# Patient Record
Sex: MALE | Race: WHITE | Employment: OTHER | URBAN - METROPOLITAN AREA
[De-identification: names, ages, dates, MRNs, and addresses within clinical notes are randomized per-mention and may not be internally consistent; named-entity substitution may affect disease eponyms.]

---

## 2021-06-15 ENCOUNTER — APPOINTMENT (OUTPATIENT)
Dept: GENERAL RADIOLOGY | Age: 61
DRG: 194 | End: 2021-06-15
Payer: COMMERCIAL

## 2021-06-15 ENCOUNTER — HOSPITAL ENCOUNTER (INPATIENT)
Age: 61
LOS: 1 days | Discharge: LEFT BEFORE DISCHARGE | DRG: 194 | End: 2021-06-16
Attending: EMERGENCY MEDICINE | Admitting: INTERNAL MEDICINE
Payer: COMMERCIAL

## 2021-06-15 DIAGNOSIS — I50.9 ACUTE DECOMPENSATED HEART FAILURE (HCC): Primary | ICD-10-CM

## 2021-06-15 LAB
ALBUMIN SERPL-MCNC: 3.5 G/DL (ref 3.5–5.2)
ALP BLD-CCNC: 91 U/L (ref 40–129)
ALT SERPL-CCNC: 32 U/L (ref 0–40)
ANION GAP SERPL CALCULATED.3IONS-SCNC: 14 MMOL/L (ref 7–16)
AST SERPL-CCNC: 23 U/L (ref 0–39)
BASOPHILS ABSOLUTE: 0.07 E9/L (ref 0–0.2)
BASOPHILS RELATIVE PERCENT: 0.8 % (ref 0–2)
BILIRUB SERPL-MCNC: 1 MG/DL (ref 0–1.2)
BUN BLDV-MCNC: 11 MG/DL (ref 6–23)
CALCIUM SERPL-MCNC: 9.2 MG/DL (ref 8.6–10.2)
CHLORIDE BLD-SCNC: 102 MMOL/L (ref 98–107)
CO2: 19 MMOL/L (ref 22–29)
CREAT SERPL-MCNC: 1 MG/DL (ref 0.7–1.2)
EKG ATRIAL RATE: 100 BPM
EKG P AXIS: 5 DEGREES
EKG P-R INTERVAL: 212 MS
EKG Q-T INTERVAL: 360 MS
EKG QRS DURATION: 114 MS
EKG QTC CALCULATION (BAZETT): 464 MS
EKG R AXIS: -52 DEGREES
EKG T AXIS: 90 DEGREES
EKG VENTRICULAR RATE: 100 BPM
EOSINOPHILS ABSOLUTE: 0.19 E9/L (ref 0.05–0.5)
EOSINOPHILS RELATIVE PERCENT: 2.1 % (ref 0–6)
GFR AFRICAN AMERICAN: >60
GFR NON-AFRICAN AMERICAN: >60 ML/MIN/1.73
GLUCOSE BLD-MCNC: 233 MG/DL (ref 74–99)
HBA1C MFR BLD: 8.1 % (ref 4–5.6)
HCT VFR BLD CALC: 50.2 % (ref 37–54)
HEMOGLOBIN: 16.1 G/DL (ref 12.5–16.5)
IMMATURE GRANULOCYTES #: 0.06 E9/L
IMMATURE GRANULOCYTES %: 0.6 % (ref 0–5)
LV EF: 18 %
LVEF MODALITY: NORMAL
LYMPHOCYTES ABSOLUTE: 1.09 E9/L (ref 1.5–4)
LYMPHOCYTES RELATIVE PERCENT: 11.8 % (ref 20–42)
MAGNESIUM: 1.7 MG/DL (ref 1.6–2.6)
MCH RBC QN AUTO: 30.8 PG (ref 26–35)
MCHC RBC AUTO-ENTMCNC: 32.1 % (ref 32–34.5)
MCV RBC AUTO: 96.2 FL (ref 80–99.9)
METER GLUCOSE: 191 MG/DL (ref 74–99)
METER GLUCOSE: 284 MG/DL (ref 74–99)
METER GLUCOSE: 292 MG/DL (ref 74–99)
MONOCYTES ABSOLUTE: 0.68 E9/L (ref 0.1–0.95)
MONOCYTES RELATIVE PERCENT: 7.3 % (ref 2–12)
NEUTROPHILS ABSOLUTE: 7.17 E9/L (ref 1.8–7.3)
NEUTROPHILS RELATIVE PERCENT: 77.4 % (ref 43–80)
PDW BLD-RTO: 14.6 FL (ref 11.5–15)
PLATELET # BLD: 180 E9/L (ref 130–450)
PMV BLD AUTO: 10.9 FL (ref 7–12)
POTASSIUM SERPL-SCNC: 4 MMOL/L (ref 3.5–5)
PRO-BNP: 4310 PG/ML (ref 0–125)
RBC # BLD: 5.22 E12/L (ref 3.8–5.8)
SODIUM BLD-SCNC: 135 MMOL/L (ref 132–146)
TOTAL PROTEIN: 6.7 G/DL (ref 6.4–8.3)
TROPONIN, HIGH SENSITIVITY: 24 NG/L (ref 0–11)
WBC # BLD: 9.3 E9/L (ref 4.5–11.5)

## 2021-06-15 PROCEDURE — 93005 ELECTROCARDIOGRAM TRACING: CPT | Performed by: EMERGENCY MEDICINE

## 2021-06-15 PROCEDURE — 83036 HEMOGLOBIN GLYCOSYLATED A1C: CPT

## 2021-06-15 PROCEDURE — 93306 TTE W/DOPPLER COMPLETE: CPT

## 2021-06-15 PROCEDURE — 84484 ASSAY OF TROPONIN QUANT: CPT

## 2021-06-15 PROCEDURE — 1200000000 HC SEMI PRIVATE

## 2021-06-15 PROCEDURE — 6360000002 HC RX W HCPCS: Performed by: INTERNAL MEDICINE

## 2021-06-15 PROCEDURE — 6370000000 HC RX 637 (ALT 250 FOR IP): Performed by: INTERNAL MEDICINE

## 2021-06-15 PROCEDURE — 6360000002 HC RX W HCPCS: Performed by: EMERGENCY MEDICINE

## 2021-06-15 PROCEDURE — 83735 ASSAY OF MAGNESIUM: CPT

## 2021-06-15 PROCEDURE — 6360000004 HC RX CONTRAST MEDICATION: Performed by: INTERNAL MEDICINE

## 2021-06-15 PROCEDURE — 99284 EMERGENCY DEPT VISIT MOD MDM: CPT

## 2021-06-15 PROCEDURE — 71045 X-RAY EXAM CHEST 1 VIEW: CPT

## 2021-06-15 PROCEDURE — 93010 ELECTROCARDIOGRAM REPORT: CPT | Performed by: INTERNAL MEDICINE

## 2021-06-15 PROCEDURE — 83880 ASSAY OF NATRIURETIC PEPTIDE: CPT

## 2021-06-15 PROCEDURE — 85025 COMPLETE CBC W/AUTO DIFF WBC: CPT

## 2021-06-15 PROCEDURE — 80053 COMPREHEN METABOLIC PANEL: CPT

## 2021-06-15 PROCEDURE — 2580000003 HC RX 258: Performed by: INTERNAL MEDICINE

## 2021-06-15 PROCEDURE — 82962 GLUCOSE BLOOD TEST: CPT

## 2021-06-15 RX ORDER — POLYETHYLENE GLYCOL 3350 17 G/17G
17 POWDER, FOR SOLUTION ORAL DAILY PRN
Status: DISCONTINUED | OUTPATIENT
Start: 2021-06-15 | End: 2021-06-16 | Stop reason: HOSPADM

## 2021-06-15 RX ORDER — ONDANSETRON 2 MG/ML
4 INJECTION INTRAMUSCULAR; INTRAVENOUS EVERY 6 HOURS PRN
Status: DISCONTINUED | OUTPATIENT
Start: 2021-06-15 | End: 2021-06-16 | Stop reason: HOSPADM

## 2021-06-15 RX ORDER — TADALAFIL 5 MG/1
5 TABLET ORAL PRN
Status: ON HOLD | COMMUNITY
End: 2021-06-16 | Stop reason: HOSPADM

## 2021-06-15 RX ORDER — ASPIRIN 81 MG/1
81 TABLET ORAL DAILY
COMMUNITY
End: 2021-10-13

## 2021-06-15 RX ORDER — ACETAMINOPHEN 325 MG/1
650 TABLET ORAL EVERY 6 HOURS PRN
Status: DISCONTINUED | OUTPATIENT
Start: 2021-06-15 | End: 2021-06-16 | Stop reason: HOSPADM

## 2021-06-15 RX ORDER — INSULIN GLARGINE 100 [IU]/ML
50 INJECTION, SOLUTION SUBCUTANEOUS NIGHTLY
Status: ON HOLD | COMMUNITY
End: 2021-09-07 | Stop reason: HOSPADM

## 2021-06-15 RX ORDER — SODIUM CHLORIDE 0.9 % (FLUSH) 0.9 %
5-40 SYRINGE (ML) INJECTION PRN
Status: DISCONTINUED | OUTPATIENT
Start: 2021-06-15 | End: 2021-06-16 | Stop reason: HOSPADM

## 2021-06-15 RX ORDER — GLYBURIDE 5 MG/1
5 TABLET ORAL 2 TIMES DAILY
Status: ON HOLD | COMMUNITY
End: 2021-09-07 | Stop reason: HOSPADM

## 2021-06-15 RX ORDER — SODIUM CHLORIDE 0.9 % (FLUSH) 0.9 %
5-40 SYRINGE (ML) INJECTION EVERY 12 HOURS SCHEDULED
Status: DISCONTINUED | OUTPATIENT
Start: 2021-06-15 | End: 2021-06-16 | Stop reason: HOSPADM

## 2021-06-15 RX ORDER — FUROSEMIDE 10 MG/ML
20 INJECTION INTRAMUSCULAR; INTRAVENOUS
Status: DISCONTINUED | OUTPATIENT
Start: 2021-06-15 | End: 2021-06-16 | Stop reason: HOSPADM

## 2021-06-15 RX ORDER — ONDANSETRON 4 MG/1
4 TABLET, ORALLY DISINTEGRATING ORAL EVERY 8 HOURS PRN
Status: DISCONTINUED | OUTPATIENT
Start: 2021-06-15 | End: 2021-06-16 | Stop reason: HOSPADM

## 2021-06-15 RX ORDER — ACETAMINOPHEN 650 MG/1
650 SUPPOSITORY RECTAL EVERY 6 HOURS PRN
Status: DISCONTINUED | OUTPATIENT
Start: 2021-06-15 | End: 2021-06-16 | Stop reason: HOSPADM

## 2021-06-15 RX ORDER — DEXTROSE MONOHYDRATE 25 G/50ML
12.5 INJECTION, SOLUTION INTRAVENOUS PRN
Status: DISCONTINUED | OUTPATIENT
Start: 2021-06-15 | End: 2021-06-16 | Stop reason: HOSPADM

## 2021-06-15 RX ORDER — NICOTINE POLACRILEX 4 MG
15 LOZENGE BUCCAL PRN
Status: DISCONTINUED | OUTPATIENT
Start: 2021-06-15 | End: 2021-06-16 | Stop reason: HOSPADM

## 2021-06-15 RX ORDER — INSULIN GLARGINE 100 [IU]/ML
25 INJECTION, SOLUTION SUBCUTANEOUS NIGHTLY
Status: DISCONTINUED | OUTPATIENT
Start: 2021-06-15 | End: 2021-06-16 | Stop reason: HOSPADM

## 2021-06-15 RX ORDER — LISINOPRIL 20 MG/1
40 TABLET ORAL DAILY
Status: DISCONTINUED | OUTPATIENT
Start: 2021-06-15 | End: 2021-06-16 | Stop reason: HOSPADM

## 2021-06-15 RX ORDER — DEXTROSE MONOHYDRATE 50 MG/ML
100 INJECTION, SOLUTION INTRAVENOUS PRN
Status: DISCONTINUED | OUTPATIENT
Start: 2021-06-15 | End: 2021-06-16 | Stop reason: HOSPADM

## 2021-06-15 RX ORDER — SODIUM CHLORIDE 9 MG/ML
25 INJECTION, SOLUTION INTRAVENOUS PRN
Status: DISCONTINUED | OUTPATIENT
Start: 2021-06-15 | End: 2021-06-16 | Stop reason: HOSPADM

## 2021-06-15 RX ORDER — FUROSEMIDE 10 MG/ML
20 INJECTION INTRAMUSCULAR; INTRAVENOUS ONCE
Status: COMPLETED | OUTPATIENT
Start: 2021-06-15 | End: 2021-06-15

## 2021-06-15 RX ADMIN — INSULIN GLARGINE 25 UNITS: 100 INJECTION, SOLUTION SUBCUTANEOUS at 20:26

## 2021-06-15 RX ADMIN — LISINOPRIL 40 MG: 20 TABLET ORAL at 14:01

## 2021-06-15 RX ADMIN — INSULIN LISPRO 3 UNITS: 100 INJECTION, SOLUTION INTRAVENOUS; SUBCUTANEOUS at 20:26

## 2021-06-15 RX ADMIN — FUROSEMIDE 20 MG: 10 INJECTION, SOLUTION INTRAVENOUS at 12:55

## 2021-06-15 RX ADMIN — ENOXAPARIN SODIUM 40 MG: 40 INJECTION SUBCUTANEOUS at 17:46

## 2021-06-15 RX ADMIN — ENOXAPARIN SODIUM 40 MG: 40 INJECTION SUBCUTANEOUS at 14:02

## 2021-06-15 RX ADMIN — PERFLUTREN 1.65 MG: 6.52 INJECTION, SUSPENSION INTRAVENOUS at 14:55

## 2021-06-15 RX ADMIN — INSULIN LISPRO 6 UNITS: 100 INJECTION, SOLUTION INTRAVENOUS; SUBCUTANEOUS at 17:47

## 2021-06-15 RX ADMIN — SODIUM CHLORIDE, PRESERVATIVE FREE 10 ML: 5 INJECTION INTRAVENOUS at 20:27

## 2021-06-15 RX ADMIN — FUROSEMIDE 20 MG: 10 INJECTION, SOLUTION INTRAVENOUS at 17:46

## 2021-06-15 ASSESSMENT — PAIN SCALES - GENERAL
PAINLEVEL_OUTOF10: 0
PAINLEVEL_OUTOF10: 4

## 2021-06-15 ASSESSMENT — PAIN DESCRIPTION - ORIENTATION: ORIENTATION: RIGHT;LEFT;LOWER

## 2021-06-15 ASSESSMENT — ENCOUNTER SYMPTOMS
DIARRHEA: 0
VOMITING: 0
COLOR CHANGE: 0
TROUBLE SWALLOWING: 0
NAUSEA: 0
RHINORRHEA: 0
BLOOD IN STOOL: 0
ABDOMINAL PAIN: 0
COUGH: 1
SHORTNESS OF BREATH: 1

## 2021-06-15 ASSESSMENT — PAIN DESCRIPTION - DESCRIPTORS: DESCRIPTORS: PATIENT UNABLE TO DESCRIBE

## 2021-06-15 ASSESSMENT — PAIN DESCRIPTION - FREQUENCY: FREQUENCY: CONTINUOUS

## 2021-06-15 ASSESSMENT — PAIN DESCRIPTION - PAIN TYPE: TYPE: ACUTE PAIN

## 2021-06-15 ASSESSMENT — PAIN DESCRIPTION - LOCATION: LOCATION: LEG

## 2021-06-15 ASSESSMENT — PAIN DESCRIPTION - PROGRESSION: CLINICAL_PROGRESSION: NOT CHANGED

## 2021-06-15 NOTE — ED NOTES
Pt. Admission initiated. SBAR faxed to floor 5s. Floor called, spoke with RN, fax verified. Pt transport called. Awaiting transport, will continue to monitor pt.       rGiselda Rosales RN  06/15/21 3115

## 2021-06-15 NOTE — ED PROVIDER NOTES
ED PROVIDER NOTE    Chief Complaint   Patient presents with    Shortness of Breath     ongoing 6 mo.  Leg Swelling     bilat lower leg edema/swelling       HPI:  6/15/21,   Time: 12:30 PM EDT       Anthony Clarke is a 63 y.o. male presenting to the ED for shortness of breath and leg swelling. Shortness of breath gradual onset x several months since having COVID, worse over the past few weeks. Worse w/ exertion. No orthopnea. +BLE swelling x 1 week. No fever, chills, chest pain. +cough prod of white sputum. No known hx of CAD or CHF. Former smoker. Taking mucinex without improvement. Gets short of breath w/ minimal exertion. Chart review: hx of HTN    Review of Systems:     Review of Systems   Constitutional: Positive for fatigue. Negative for appetite change, chills and fever. HENT: Negative for congestion, rhinorrhea and trouble swallowing. Eyes: Negative for visual disturbance. Respiratory: Positive for cough and shortness of breath. Cardiovascular: Positive for leg swelling. Negative for chest pain. Gastrointestinal: Negative for abdominal pain, blood in stool, diarrhea, nausea and vomiting. Genitourinary: Negative for decreased urine volume, difficulty urinating, dysuria, frequency, hematuria and urgency. Musculoskeletal: Negative for myalgias, neck pain and neck stiffness. Skin: Negative for color change. Neurological: Negative for dizziness, syncope, weakness, light-headedness, numbness and headaches.         --------------------------------------------- PAST HISTORY ---------------------------------------------  Past Medical History:   Past Medical History:   Diagnosis Date    Hypertension        Past Surgical History:   History reviewed. No pertinent surgical history.     Social History:   Social History     Socioeconomic History    Marital status:      Spouse name: None    Number of children: None    Years of education: None    Highest education level: None components within normal limits   MAGNESIUM   HEMOGLOBIN A1C       RADIOLOGY:  Interpreted personally and by Radiologist.  XR CHEST PORTABLE   Final Result   Interstitial and hazy opacities bilaterally suggestive of pulmonary vascular   congestion and developing interstitial pulmonary edema or peribronchial   inflammatory changes. EKG:  This EKG is signed and interpreted by the EP. Sinus rhythm w/ 1st degree AV block, vent rate 100bpm, LAD, nonspecific ST-T changes, no clinically significant change compared w/ prior EKG       ------------------------- NURSING NOTES AND VITALS REVIEWED ---------------------------   The nursing notes within the ED encounter and vital signs as below have been reviewed by myself. BP (!) 154/107   Pulse 94   Temp 97.8 °F (36.6 °C) (Tympanic)   Resp 16   Ht 6' 3\" (1.905 m)   Wt 280 lb (127 kg)   SpO2 96%   BMI 35.00 kg/m²   Oxygen Saturation Interpretation: Normal    The patients available past medical records and past encounters were reviewed. ------------------------------ ED COURSE/MEDICAL DECISION MAKING----------------------  Medications   furosemide (LASIX) injection 20 mg (has no administration in time range)   furosemide (LASIX) injection 20 mg (has no administration in time range)   perflutren lipid microspheres (DEFINITY) injection 1.65 mg (has no administration in time range)       Counseling: The emergency provider has spoken with the patient and discussed todays results, in addition to providing specific details for the plan of care and counseling regarding the diagnosis and prognosis. Questions are answered at this time and they are agreeable with the plan. ED Course/Medical Decision Makin y.o. male here with shortness of breath and leg swelling. Clinical evaluation, labs, imaging all c/w new onset CHF. O2 sats drop from 96% to 92% with conversation while sitting in bed.  Started IV diuresis in ED and admitted in stable condition for

## 2021-06-16 VITALS
SYSTOLIC BLOOD PRESSURE: 151 MMHG | RESPIRATION RATE: 16 BRPM | TEMPERATURE: 97.5 F | WEIGHT: 296.8 LBS | OXYGEN SATURATION: 96 % | HEART RATE: 80 BPM | DIASTOLIC BLOOD PRESSURE: 107 MMHG | HEIGHT: 75 IN | BODY MASS INDEX: 36.9 KG/M2

## 2021-06-16 PROBLEM — I10 ESSENTIAL HYPERTENSION: Status: ACTIVE | Noted: 2021-06-16

## 2021-06-16 PROBLEM — E66.01 CLASS 2 SEVERE OBESITY DUE TO EXCESS CALORIES WITH SERIOUS COMORBIDITY IN ADULT (HCC): Status: ACTIVE | Noted: 2021-06-16

## 2021-06-16 PROBLEM — E11.65 UNCONTROLLED TYPE 2 DIABETES MELLITUS WITH HYPERGLYCEMIA (HCC): Status: ACTIVE | Noted: 2021-06-16

## 2021-06-16 PROBLEM — I50.21 ACUTE SYSTOLIC CONGESTIVE HEART FAILURE (HCC): Status: ACTIVE | Noted: 2021-06-15

## 2021-06-16 PROBLEM — E66.812 CLASS 2 SEVERE OBESITY DUE TO EXCESS CALORIES WITH SERIOUS COMORBIDITY IN ADULT (HCC): Status: ACTIVE | Noted: 2021-06-16

## 2021-06-16 PROBLEM — I35.0 SEVERE AORTIC STENOSIS: Status: ACTIVE | Noted: 2021-06-16

## 2021-06-16 PROBLEM — I50.21 ACUTE SYSTOLIC CHF (CONGESTIVE HEART FAILURE) (HCC): Status: ACTIVE | Noted: 2021-06-16

## 2021-06-16 LAB
ANION GAP SERPL CALCULATED.3IONS-SCNC: 9 MMOL/L (ref 7–16)
BASOPHILS ABSOLUTE: 0.05 E9/L (ref 0–0.2)
BASOPHILS RELATIVE PERCENT: 0.6 % (ref 0–2)
BUN BLDV-MCNC: 12 MG/DL (ref 6–23)
CALCIUM SERPL-MCNC: 9.1 MG/DL (ref 8.6–10.2)
CHLORIDE BLD-SCNC: 104 MMOL/L (ref 98–107)
CO2: 25 MMOL/L (ref 22–29)
CREAT SERPL-MCNC: 1.1 MG/DL (ref 0.7–1.2)
EOSINOPHILS ABSOLUTE: 0.24 E9/L (ref 0.05–0.5)
EOSINOPHILS RELATIVE PERCENT: 2.7 % (ref 0–6)
GFR AFRICAN AMERICAN: >60
GFR NON-AFRICAN AMERICAN: >60 ML/MIN/1.73
GLUCOSE BLD-MCNC: 279 MG/DL (ref 74–99)
HCT VFR BLD CALC: 46 % (ref 37–54)
HEMOGLOBIN: 15.2 G/DL (ref 12.5–16.5)
IMMATURE GRANULOCYTES #: 0.03 E9/L
IMMATURE GRANULOCYTES %: 0.3 % (ref 0–5)
LYMPHOCYTES ABSOLUTE: 0.85 E9/L (ref 1.5–4)
LYMPHOCYTES RELATIVE PERCENT: 9.4 % (ref 20–42)
MCH RBC QN AUTO: 31.5 PG (ref 26–35)
MCHC RBC AUTO-ENTMCNC: 33 % (ref 32–34.5)
MCV RBC AUTO: 95.4 FL (ref 80–99.9)
METER GLUCOSE: 154 MG/DL (ref 74–99)
METER GLUCOSE: 280 MG/DL (ref 74–99)
MONOCYTES ABSOLUTE: 0.66 E9/L (ref 0.1–0.95)
MONOCYTES RELATIVE PERCENT: 7.3 % (ref 2–12)
NEUTROPHILS ABSOLUTE: 7.22 E9/L (ref 1.8–7.3)
NEUTROPHILS RELATIVE PERCENT: 79.7 % (ref 43–80)
PDW BLD-RTO: 14.6 FL (ref 11.5–15)
PLATELET # BLD: 175 E9/L (ref 130–450)
PMV BLD AUTO: 11.6 FL (ref 7–12)
POTASSIUM REFLEX MAGNESIUM: 4 MMOL/L (ref 3.5–5)
RBC # BLD: 4.82 E12/L (ref 3.8–5.8)
SODIUM BLD-SCNC: 138 MMOL/L (ref 132–146)
WBC # BLD: 9.1 E9/L (ref 4.5–11.5)

## 2021-06-16 PROCEDURE — 36415 COLL VENOUS BLD VENIPUNCTURE: CPT

## 2021-06-16 PROCEDURE — 85025 COMPLETE CBC W/AUTO DIFF WBC: CPT

## 2021-06-16 PROCEDURE — 6360000002 HC RX W HCPCS: Performed by: INTERNAL MEDICINE

## 2021-06-16 PROCEDURE — 80048 BASIC METABOLIC PNL TOTAL CA: CPT

## 2021-06-16 PROCEDURE — 6370000000 HC RX 637 (ALT 250 FOR IP): Performed by: INTERNAL MEDICINE

## 2021-06-16 PROCEDURE — 2580000003 HC RX 258: Performed by: INTERNAL MEDICINE

## 2021-06-16 PROCEDURE — 82962 GLUCOSE BLOOD TEST: CPT

## 2021-06-16 RX ORDER — AMLODIPINE BESYLATE 10 MG/1
10 TABLET ORAL DAILY
Status: ON HOLD | COMMUNITY
End: 2021-09-07 | Stop reason: HOSPADM

## 2021-06-16 RX ORDER — METOPROLOL SUCCINATE 25 MG/1
25 TABLET, EXTENDED RELEASE ORAL DAILY
Qty: 30 TABLET | Refills: 3 | Status: SHIPPED | OUTPATIENT
Start: 2021-06-17 | End: 2021-07-27

## 2021-06-16 RX ORDER — METOPROLOL SUCCINATE 25 MG/1
25 TABLET, EXTENDED RELEASE ORAL DAILY
Status: DISCONTINUED | OUTPATIENT
Start: 2021-06-16 | End: 2021-06-16 | Stop reason: HOSPADM

## 2021-06-16 RX ADMIN — INSULIN LISPRO 2 UNITS: 100 INJECTION, SOLUTION INTRAVENOUS; SUBCUTANEOUS at 06:32

## 2021-06-16 RX ADMIN — FUROSEMIDE 20 MG: 10 INJECTION, SOLUTION INTRAVENOUS at 06:32

## 2021-06-16 RX ADMIN — METOPROLOL SUCCINATE 25 MG: 25 TABLET, EXTENDED RELEASE ORAL at 09:58

## 2021-06-16 RX ADMIN — ENOXAPARIN SODIUM 40 MG: 40 INJECTION SUBCUTANEOUS at 08:07

## 2021-06-16 RX ADMIN — LISINOPRIL 40 MG: 20 TABLET ORAL at 08:07

## 2021-06-16 RX ADMIN — INSULIN LISPRO 6 UNITS: 100 INJECTION, SOLUTION INTRAVENOUS; SUBCUTANEOUS at 12:06

## 2021-06-16 RX ADMIN — SODIUM CHLORIDE, PRESERVATIVE FREE 10 ML: 5 INJECTION INTRAVENOUS at 08:07

## 2021-06-16 ASSESSMENT — PAIN SCALES - GENERAL: PAINLEVEL_OUTOF10: 0

## 2021-06-16 NOTE — CARE COORDINATION
Social Work / Discharge Planning : SW met with patient and explained role as discharge planner/ transition of care. Patient admitted with CHF> patient plan at discharge is HOME where he resides independently. Patient does NOT use a device. No hx of HHC/SNF. Patient PCP is DR Trip Villarreal and he uses [facility] on [address]. Patient does NOT have insurance. Stated he is going through divorce and he couldn't afford it. Self employed as  but has not been able to work last few weeks. SW explaiend to patient process with HELP department. Await screening and see what patient is eligible for insurance/med wise. Patient denies any additional home needs. AWait plan. SW to follow.  Electronically signed by BILLY Astorga on 6/16/21 at 8:26 AM EDT

## 2021-06-16 NOTE — H&P
[address] [facility]  History and Physical      CHIEF COMPLAINT: Leg swelling      HISTORY OF PRESENT ILLNESS:      The patient is a 63 y.o. male patient of Dr. Librado Ward history of hypertension, diabetes who presents with leg swelling. Patient presented to ED yesterday with complaints of leg swelling and shortness of breath. Patient notes dyspnea gradual onset over the last 6 months. Dyspnea is worse with exacerbation denies orthopnea. No fevers chills + cough. +1-week of worsening leg swelling. History of tobacco abuse. Denies history of CHF or COPD. Past Medical History:    Past Medical History:   Diagnosis Date    Hypertension        Past Surgical History:    History reviewed. No pertinent surgical history. Medications Prior to Admission:    Medications Prior to Admission: insulin glargine (BASAGLAR KWIKPEN) 100 UNIT/ML injection pen, Inject 50 Units into the skin nightly  tadalafil (CIALIS) 5 MG tablet, Take 5 mg by mouth as needed for Erectile Dysfunction  aspirin 81 MG EC tablet, Take 81 mg by mouth daily  lisinopril (PRINIVIL;ZESTRIL) 20 MG tablet, Take 40 mg by mouth daily. glyBURIDE (DIABETA) 5 MG tablet, Take 5 mg by mouth as needed    Allergies:    Patient has no known allergies. Social History:    reports that he quit smoking about 20 years ago. His smoking use included cigarettes. He started smoking about 48 years ago. He smoked 1.50 packs per day. He has never used smokeless tobacco. He reports current alcohol use. He reports that he does not use drugs. Family History:   family history includes Breast Cancer in his sister; Heart Disease in his father; No Known Problems in his brother, brother, and sister; Other in his mother.     REVIEW OF SYSTEMS:  As above in the HPI, otherwise negative    PHYSICAL EXAM:    Vitals:  BP (!) 151/107   Pulse 80   Temp 97.5 °F (36.4 °C) (Oral)   Resp 16   Ht 6' 3\" (1.905 m)   Wt 296 lb 12.8 oz (134.6 kg)   SpO2 96%   BMI 37.10 kg/m²     General:  Awake, alert, oriented X 3. Well developed, well nourished, well groomed. No apparent distress. HEENT:  Normocephalic, atraumatic. Pupils equal, round, reactive to light. No scleral icterus. No conjunctival injection. Normal lips, teeth, and gums. No nasal discharge. Neck:  Supple  Heart:  RRR, no murmurs, gallops, rubs  Lungs:  CTA bilaterally, bilat symmetrical expansion, no wheeze, rales, or rhonchi  Abdomen:   Bowel sounds present, soft, nontender, no masses, no organomegaly, no peritoneal signs  Extremities:  No clubbing, cyanosis, 1+edema  Skin:  Warm and dry, no open lesions or rash  Neuro:  Cranial nerves 2-12 intact, no focal deficits  Breast: deferred  Rectal: deferred  Genitalia:  deferred    LABS:    CBC with Differential:    Lab Results   Component Value Date    WBC 9.3 06/15/2021    RBC 5.22 06/15/2021    HGB 16.1 06/15/2021    HCT 50.2 06/15/2021     06/15/2021    MCV 96.2 06/15/2021    MCH 30.8 06/15/2021    MCHC 32.1 06/15/2021    RDW 14.6 06/15/2021    LYMPHOPCT 11.8 06/15/2021    MONOPCT 7.3 06/15/2021    BASOPCT 0.8 06/15/2021    MONOSABS 0.68 06/15/2021    LYMPHSABS 1.09 06/15/2021    EOSABS 0.19 06/15/2021    BASOSABS 0.07 06/15/2021     CMP:    Lab Results   Component Value Date     06/15/2021    K 4.0 06/15/2021     06/15/2021    CO2 19 06/15/2021    BUN 11 06/15/2021    CREATININE 1.0 06/15/2021    GFRAA >60 06/15/2021    LABGLOM >60 06/15/2021    GLUCOSE 233 06/15/2021    GLUCOSE 259 04/13/2012    PROT 6.7 06/15/2021    LABALBU 3.5 06/15/2021    CALCIUM 9.2 06/15/2021    BILITOT 1.0 06/15/2021    ALKPHOS 91 06/15/2021    AST 23 06/15/2021    ALT 32 06/15/2021     Magnesium:    Lab Results   Component Value Date    MG 1.7 06/15/2021     Phosphorus:  No results found for: PHOS  PT/INR:  No results found for: PROTIME, INR  Last 3 Troponin:  No results found for: TROPONINI  U/A:  No results found for: NITRITE, COLORU, PROTEINU, PHUR, LABCAST, Fredrica Speed, MUCUS, TRICHOMONAS, YEAST, BACTERIA, CLARITYU, SPECGRAV, LEUKOCYTESUR, UROBILINOGEN, BILIRUBINUR, BLOODU, GLUCOSEU, AMORPHOUS  ABG:  No results found for: PH, PCO2, PO2, HCO3, BE, THGB, TCO2, O2SAT  HgBA1c:    Lab Results   Component Value Date    LABA1C 8.1 06/15/2021     FLP:  No results found for: TRIG, HDL, LDLCALC, LDLDIRECT, LABVLDL  TSH:  No results found for: TSH    XR CHEST PORTABLE   Final Result   Interstitial and hazy opacities bilaterally suggestive of pulmonary vascular   congestion and developing interstitial pulmonary edema or peribronchial   inflammatory changes. ASSESSMENT:      Principal Problem:    Acute systolic congestive heart failure (HCC)  Active Problems:    Class 2 severe obesity due to excess calories with serious comorbidity in adult [facility])    Uncontrolled type 2 diabetes mellitus with hyperglycemia (HCC)    Essential hypertension    Severe aortic stenosis  Resolved Problems:    * No resolved hospital problems. *      PLAN:    26-year-old male history of obesity, diabetes, hypertension admitted with CHF    Admit monitored bed  IV Lasix  Monitor I's and O's  Daily weights  Monitor labs closely   Echo EF 15 to 20% with stage III diastolic dysfunction, moderately dilated RV with low normal global systolic, severe AS with valve area of 0.8 cm²    Medications for other co morbidities cont as appropriate w dosage adjustments as necessary  PT/OT  DVT PPx  DC planning recommend transfer [address] [facility], CTS, valve team. Pt refuses states stuff to  do. Advised of risk for death and permanent diability Pt to go AMA.          Electronically signed by Kishor Bunn MD on 6/16/2021 at 8:54 AM

## 2021-06-16 NOTE — PROGRESS NOTES
Called [facility] updated patient medication list the pharmacist there said he hasn't filled most of his prescriptions since September 2020. Lacy Buck

## 2021-06-16 NOTE — PLAN OF CARE
Problem: Pain:  Goal: Pain level will decrease  Description: Pain level will decrease  6/16/2021 0152 by Sherryle Long, RN  Outcome: Ongoing  6/15/2021 1303 by Fatoumata Mayberry RN  Outcome: Met This Shift  Goal: Control of acute pain  Description: Control of acute pain  6/16/2021 0152 by Sherryle Long, RN  Outcome: Ongoing  6/15/2021 1303 by Fatoumata Mayberry RN  Outcome: Met This Shift  Goal: Control of chronic pain  Description: Control of chronic pain  6/16/2021 0152 by Sherryle Long, RN  Outcome: Ongoing  6/15/2021 1303 by Fatoumata Mayberry RN  Outcome: Met This Shift     Problem: Falls - Risk of:  Goal: Will remain free from falls  Description: Will remain free from falls  6/16/2021 0152 by Sherryle Long, RN  Outcome: Ongoing  6/15/2021 1303 by Fatoumata Mayberry RN  Outcome: Met This Shift  Goal: Absence of physical injury  Description: Absence of physical injury  6/16/2021 0152 by Sherryle Long, RN  Outcome: Ongoing  6/15/2021 1303 by Fatoumata Mayberry RN  Outcome: Met This Shift     Problem: HH FLUID RETENTION-CHF  Goal: Absence of fluid overload signs and symptoms  6/16/2021 0152 by Sherryle Long, RN  Outcome: Ongoing  6/15/2021 1303 by Fatoumata Mayberry RN  Outcome: Ongoing     Problem: Gas Exchange - Impaired  Goal: Able to breathe comfortably  Description: Able to breathe comfortably  6/16/2021 0152 by Sherryle Long, RN  Outcome: Ongoing  6/15/2021 1303 by Fatoumata Mayberry RN  Outcome: Ongoing

## 2021-06-16 NOTE — DISCHARGE SUMMARY
Physician Discharge Summary     Patient ID:  Sukhdev Alexander  [id number]  63 y.o.  1960    Admit date: 6/15/2021    Discharge date and time:  6/16/2021    Discharge Diagnoses: Principal Problem:    Acute systolic congestive heart failure (HCC)  Active Problems:    Class 2 severe obesity due to excess calories with serious comorbidity in [facility])    Uncontrolled type 2 diabetes mellitus with hyperglycemia (HCC)    Essential hypertension    Severe aortic stenosis  Resolved Problems:    * No resolved hospital problems. *      Consults: IP CONSULT TO INTERNAL MEDICINE  IP CONSULT TO HEART FAILURE NURSE/COORDINATOR    Procedures: See below    Hospital Course:   69-year-old male history of obesity, diabetes, hypertension admitted with CHF    Admit monitored bed  IV Lasix  Monitor I's and O's  Daily weights  Monitor labs closely   Echo EF 15 to 20% with stage III diastolic dysfunction, moderately dilated RV with low normal global systolic, severe AS with valve area of 0.8 cm²    Medications for other co morbidities cont as appropriate w dosage adjustments as necessary  PT/OT  DVT PPx  DC planning recommend transfer [facility], CTS, valve team. Pt refuses states stuff to  do. Advised of risk for death and permanent diability Pt to go AMA. Advised return ASAP.          Discharge Exam:  See progress note from today    Condition:  Stable    Disposition: AMA    Patient Instructions:   Current Discharge Medication List      START taking these medications    Details   metoprolol succinate (TOPROL XL) 25 MG extended release tablet Take 1 tablet by mouth daily  Qty: 30 tablet, Refills: 3         CONTINUE these medications which have NOT CHANGED    Details   amLODIPine (NORVASC) 10 MG tablet Take 10 mg by mouth daily      insulin glargine (BASAGLAR KWIKPEN) 100 UNIT/ML injection pen Inject 50 Units into the skin nightly      aspirin 81 MG EC tablet Take 81 mg by mouth daily      glyBURIDE (DIABETA) 5 MG tablet Take 5 mg by mouth 2 times daily       lisinopril (PRINIVIL;ZESTRIL) 20 MG tablet Take 20 mg by mouth 2 times daily          STOP taking these medications       tadalafil (CIALIS) 5 MG tablet Comments:   Reason for Stopping:             Activity: activity as tolerated  Diet: cardiac diet    Follow-up with Return ASAP for further treatment    Signed:  Jacki Trujillo MD  6/16/2021  1:54 PM

## 2021-06-17 ENCOUNTER — APPOINTMENT (OUTPATIENT)
Dept: GENERAL RADIOLOGY | Age: 61
DRG: 192 | End: 2021-06-17
Payer: COMMERCIAL

## 2021-06-17 ENCOUNTER — TELEPHONE (OUTPATIENT)
Dept: OTHER | Facility: CLINIC | Age: 61
End: 2021-06-17

## 2021-06-17 ENCOUNTER — HOSPITAL ENCOUNTER (INPATIENT)
Age: 61
LOS: 6 days | Discharge: HOME | DRG: 192 | End: 2021-06-23
Attending: EMERGENCY MEDICINE | Admitting: INTERNAL MEDICINE
Payer: COMMERCIAL

## 2021-06-17 DIAGNOSIS — I50.31 ACUTE DIASTOLIC CONGESTIVE HEART FAILURE (HCC): Primary | ICD-10-CM

## 2021-06-17 PROBLEM — I50.9 HEART FAILURE (HCC): Status: ACTIVE | Noted: 2021-06-17

## 2021-06-17 LAB
ANION GAP SERPL CALCULATED.3IONS-SCNC: 10 MMOL/L (ref 7–16)
BUN BLDV-MCNC: 18 MG/DL (ref 6–23)
CALCIUM SERPL-MCNC: 9.3 MG/DL (ref 8.6–10.2)
CHLORIDE BLD-SCNC: 99 MMOL/L (ref 98–107)
CO2: 27 MMOL/L (ref 22–29)
CREAT SERPL-MCNC: 1.1 MG/DL (ref 0.7–1.2)
EKG ATRIAL RATE: 100 BPM
EKG P AXIS: 51 DEGREES
EKG P-R INTERVAL: 186 MS
EKG Q-T INTERVAL: 396 MS
EKG QRS DURATION: 132 MS
EKG QTC CALCULATION (BAZETT): 510 MS
EKG R AXIS: -54 DEGREES
EKG T AXIS: 97 DEGREES
EKG VENTRICULAR RATE: 100 BPM
GFR AFRICAN AMERICAN: >60
GFR NON-AFRICAN AMERICAN: >60 ML/MIN/1.73
GLUCOSE BLD-MCNC: 364 MG/DL (ref 74–99)
HCT VFR BLD CALC: 50.5 % (ref 37–54)
HEMOGLOBIN: 16 G/DL (ref 12.5–16.5)
MCH RBC QN AUTO: 30.4 PG (ref 26–35)
MCHC RBC AUTO-ENTMCNC: 31.7 % (ref 32–34.5)
MCV RBC AUTO: 96 FL (ref 80–99.9)
METER GLUCOSE: 338 MG/DL (ref 74–99)
PDW BLD-RTO: 14.6 FL (ref 11.5–15)
PLATELET # BLD: 179 E9/L (ref 130–450)
PMV BLD AUTO: 11.8 FL (ref 7–12)
POTASSIUM SERPL-SCNC: 4.6 MMOL/L (ref 3.5–5)
PRO-BNP: 5826 PG/ML (ref 0–125)
RBC # BLD: 5.26 E12/L (ref 3.8–5.8)
SODIUM BLD-SCNC: 136 MMOL/L (ref 132–146)
TROPONIN, HIGH SENSITIVITY: 26 NG/L (ref 0–11)
WBC # BLD: 9 E9/L (ref 4.5–11.5)

## 2021-06-17 PROCEDURE — 85027 COMPLETE CBC AUTOMATED: CPT

## 2021-06-17 PROCEDURE — 99255 IP/OBS CONSLTJ NEW/EST HI 80: CPT | Performed by: INTERNAL MEDICINE

## 2021-06-17 PROCEDURE — 93005 ELECTROCARDIOGRAM TRACING: CPT | Performed by: EMERGENCY MEDICINE

## 2021-06-17 PROCEDURE — 80048 BASIC METABOLIC PNL TOTAL CA: CPT

## 2021-06-17 PROCEDURE — 99284 EMERGENCY DEPT VISIT MOD MDM: CPT

## 2021-06-17 PROCEDURE — 6370000000 HC RX 637 (ALT 250 FOR IP): Performed by: INTERNAL MEDICINE

## 2021-06-17 PROCEDURE — 71046 X-RAY EXAM CHEST 2 VIEWS: CPT

## 2021-06-17 PROCEDURE — 82962 GLUCOSE BLOOD TEST: CPT

## 2021-06-17 PROCEDURE — 93010 ELECTROCARDIOGRAM REPORT: CPT | Performed by: INTERNAL MEDICINE

## 2021-06-17 PROCEDURE — 2140000000 HC CCU INTERMEDIATE R&B

## 2021-06-17 PROCEDURE — 6370000000 HC RX 637 (ALT 250 FOR IP): Performed by: FAMILY MEDICINE

## 2021-06-17 PROCEDURE — APPSS60 APP SPLIT SHARED TIME 46-60 MINUTES: Performed by: PHYSICIAN ASSISTANT

## 2021-06-17 PROCEDURE — 2580000003 HC RX 258: Performed by: FAMILY MEDICINE

## 2021-06-17 PROCEDURE — 84484 ASSAY OF TROPONIN QUANT: CPT

## 2021-06-17 PROCEDURE — 83880 ASSAY OF NATRIURETIC PEPTIDE: CPT

## 2021-06-17 PROCEDURE — 6360000002 HC RX W HCPCS: Performed by: INTERNAL MEDICINE

## 2021-06-17 PROCEDURE — 6360000002 HC RX W HCPCS: Performed by: EMERGENCY MEDICINE

## 2021-06-17 PROCEDURE — 6370000000 HC RX 637 (ALT 250 FOR IP): Performed by: EMERGENCY MEDICINE

## 2021-06-17 RX ORDER — SPIRONOLACTONE 25 MG/1
25 TABLET ORAL DAILY
Status: DISCONTINUED | OUTPATIENT
Start: 2021-06-17 | End: 2021-06-23 | Stop reason: HOSPADM

## 2021-06-17 RX ORDER — AMLODIPINE BESYLATE 10 MG/1
10 TABLET ORAL DAILY
Status: DISCONTINUED | OUTPATIENT
Start: 2021-06-17 | End: 2021-06-17

## 2021-06-17 RX ORDER — LISINOPRIL 20 MG/1
20 TABLET ORAL 2 TIMES DAILY
Status: DISCONTINUED | OUTPATIENT
Start: 2021-06-17 | End: 2021-06-23 | Stop reason: HOSPADM

## 2021-06-17 RX ORDER — ACETAMINOPHEN 650 MG/1
650 SUPPOSITORY RECTAL EVERY 6 HOURS PRN
Status: DISCONTINUED | OUTPATIENT
Start: 2021-06-17 | End: 2021-06-23 | Stop reason: HOSPADM

## 2021-06-17 RX ORDER — ACETAMINOPHEN 325 MG/1
650 TABLET ORAL EVERY 6 HOURS PRN
Status: DISCONTINUED | OUTPATIENT
Start: 2021-06-17 | End: 2021-06-23 | Stop reason: HOSPADM

## 2021-06-17 RX ORDER — SODIUM CHLORIDE 9 MG/ML
25 INJECTION, SOLUTION INTRAVENOUS PRN
Status: DISCONTINUED | OUTPATIENT
Start: 2021-06-17 | End: 2021-06-23 | Stop reason: HOSPADM

## 2021-06-17 RX ORDER — ASPIRIN 81 MG/1
81 TABLET ORAL DAILY
Status: DISCONTINUED | OUTPATIENT
Start: 2021-06-17 | End: 2021-06-23 | Stop reason: HOSPADM

## 2021-06-17 RX ORDER — SODIUM CHLORIDE 0.9 % (FLUSH) 0.9 %
10 SYRINGE (ML) INJECTION PRN
Status: DISCONTINUED | OUTPATIENT
Start: 2021-06-17 | End: 2021-06-23 | Stop reason: HOSPADM

## 2021-06-17 RX ORDER — FUROSEMIDE 10 MG/ML
40 INJECTION INTRAMUSCULAR; INTRAVENOUS 2 TIMES DAILY
Status: DISCONTINUED | OUTPATIENT
Start: 2021-06-17 | End: 2021-06-21

## 2021-06-17 RX ORDER — GLIPIZIDE 5 MG/1
5 TABLET ORAL
Status: DISCONTINUED | OUTPATIENT
Start: 2021-06-17 | End: 2021-06-23 | Stop reason: HOSPADM

## 2021-06-17 RX ORDER — FUROSEMIDE 10 MG/ML
40 INJECTION INTRAMUSCULAR; INTRAVENOUS ONCE
Status: COMPLETED | OUTPATIENT
Start: 2021-06-17 | End: 2021-06-17

## 2021-06-17 RX ORDER — ONDANSETRON 4 MG/1
4 TABLET, ORALLY DISINTEGRATING ORAL EVERY 8 HOURS PRN
Status: DISCONTINUED | OUTPATIENT
Start: 2021-06-17 | End: 2021-06-19

## 2021-06-17 RX ORDER — ONDANSETRON 2 MG/ML
4 INJECTION INTRAMUSCULAR; INTRAVENOUS EVERY 6 HOURS PRN
Status: DISCONTINUED | OUTPATIENT
Start: 2021-06-17 | End: 2021-06-19

## 2021-06-17 RX ORDER — GLYBURIDE 5 MG/1
5 TABLET ORAL 2 TIMES DAILY
Status: DISCONTINUED | OUTPATIENT
Start: 2021-06-17 | End: 2021-06-17 | Stop reason: CLARIF

## 2021-06-17 RX ORDER — METOPROLOL SUCCINATE 25 MG/1
25 TABLET, EXTENDED RELEASE ORAL DAILY
Status: DISCONTINUED | OUTPATIENT
Start: 2021-06-17 | End: 2021-06-19

## 2021-06-17 RX ORDER — SODIUM CHLORIDE 0.9 % (FLUSH) 0.9 %
5-40 SYRINGE (ML) INJECTION EVERY 12 HOURS SCHEDULED
Status: DISCONTINUED | OUTPATIENT
Start: 2021-06-17 | End: 2021-06-23 | Stop reason: HOSPADM

## 2021-06-17 RX ADMIN — LISINOPRIL 20 MG: 20 TABLET ORAL at 21:00

## 2021-06-17 RX ADMIN — GLIPIZIDE 5 MG: 5 TABLET ORAL at 18:03

## 2021-06-17 RX ADMIN — FUROSEMIDE 40 MG: 10 INJECTION, SOLUTION INTRAVENOUS at 18:03

## 2021-06-17 RX ADMIN — METOPROLOL SUCCINATE 25 MG: 25 TABLET, EXTENDED RELEASE ORAL at 18:03

## 2021-06-17 RX ADMIN — INSULIN HUMAN 10 UNITS: 100 INJECTION, SOLUTION PARENTERAL at 10:30

## 2021-06-17 RX ADMIN — FUROSEMIDE 40 MG: 10 INJECTION, SOLUTION INTRAMUSCULAR; INTRAVENOUS at 10:31

## 2021-06-17 RX ADMIN — SPIRONOLACTONE 25 MG: 25 TABLET ORAL at 18:03

## 2021-06-17 RX ADMIN — ASPIRIN 81 MG: 81 TABLET, COATED ORAL at 18:03

## 2021-06-17 RX ADMIN — Medication 10 ML: at 21:00

## 2021-06-17 ASSESSMENT — PAIN SCALES - GENERAL: PAINLEVEL_OUTOF10: 0

## 2021-06-17 NOTE — TELEPHONE ENCOUNTER
Writer contacted Dr. Elder ([place] ED provider to inform of 30 day readmission risk. Writer's attempt to contact ED provider was unsuccessful.

## 2021-06-17 NOTE — ED NOTES
Bed: 21  Expected date:   Expected time:   Means of arrival:   Comments:  juan ramon Barros RN  06/17/21 5189

## 2021-06-17 NOTE — ED PROVIDER NOTES
HPI:  6/17/21, Time: 8:38 AM EDT         Cherise Charlton is a 63 y.o. male presenting to the ED for SOB HAMILTON , beginning several days ago. The complaint has been persistent, severe in severity, and worsened by moderate exertion, supine position. Patient has been short of breath for several weeks was seen at [facility] ED yesterday was admitted to the hospital left consult medical advice after treatment team wanted to transfer him for cardiac catheterization. Patient is insulin-dependent diabetic states his legs been swelling up. While [facility] he had an echo which showed a EF of 15 to 20% with stage III diastolic dysfunction with moderate dilated RV and low normal global systolic function. He had severe AS with valve area 0.8 cm cube. Patient has left AGAINST MEDICAL ADVICE because he had things to do. He presents today with worsening shortness of breath. He has no fevers chills or cough. Denies chest pain. Patient placed on 3 L nasal cannula because O2 sat 90%. ROS:   Pertinent positives and negatives are stated within HPI, all other systems reviewed and are negative.  --------------------------------------------- PAST HISTORY ---------------------------------------------  Past Medical History:  has a past medical history of Hypertension. Past Surgical History:  has no past surgical history on file. Social History:  reports that he quit smoking about 20 years ago. His smoking use included cigarettes. He started smoking about 48 years ago. He smoked 1.50 packs per day. He has never used smokeless tobacco. He reports current alcohol use. He reports that he does not use drugs. Family History: family history includes Breast Cancer in his sister; Heart Disease in his father; No Known Problems in his brother, brother, and sister; Other in his mother. The patients home medications have been reviewed.     Allergies: Patient has no known allergies. ---------------------------------------------------PHYSICAL EXAM--------------------------------------    Constitutional/General: Alert and oriented x3, well appearing, non toxic in NAD  Head: Normocephalic and atraumatic  Eyes: PERRL, EOMI  Mouth: Oropharynx clear, handling secretions, no trismus  Neck: Supple, full ROM, non tender to palpation in the midline, no stridor, no crepitus, no meningeal signs  Pulmonary: Lungs clear to auscultation bilaterally, no wheezes, mild rales bilaterally, or rhonchi. Not in respiratory distress  Cardiovascular:  Regular rate. Regular rhythm. No murmurs, gallops, or rubs. 2+ distal pulses  Chest: no chest wall tenderness  Abdomen: Soft. Non tender. Non distended. +BS. No rebound, guarding, or rigidity. No pulsatile masses appreciated. Musculoskeletal: Moves all extremities x 4. Warm and well perfused, no clubbing, cyanosis, 3+ pitting edema. Capillary refill <3 seconds  Skin: warm and dry. No rashes. Neurologic: GCS 15, CN 2-12 grossly intact, no focal deficits, symmetric strength 5/5 in the upper and lower extremities bilaterally  Psych: Normal Affect    -------------------------------------------------- RESULTS -------------------------------------------------  I have personally reviewed all laboratory and imaging results for this patient. Results are listed below.      LABS:  Results for orders placed or performed during the hospital encounter of 06/17/21   Troponin   Result Value Ref Range    Troponin, High Sensitivity 26 (H) 0 - 11 ng/L   Basic Metabolic Panel   Result Value Ref Range    Sodium 136 132 - 146 mmol/L    Potassium 4.6 3.5 - 5.0 mmol/L    Chloride 99 98 - 107 mmol/L    CO2 27 22 - 29 mmol/L    Anion Gap 10 7 - 16 mmol/L    Glucose 364 (H) 74 - 99 mg/dL    BUN 18 6 - 23 mg/dL    CREATININE 1.1 0.7 - 1.2 mg/dL    GFR Non-African American >60 >=60 mL/min/1.73    GFR African American >60     Calcium 9.3 8.6 - 10.2 mg/dL   Brain Natriuretic Peptide Result Value Ref Range    Pro-BNP 5,826 (H) 0 - 125 pg/mL   CBC   Result Value Ref Range    WBC 9.0 4.5 - 11.5 E9/L    RBC 5.26 3.80 - 5.80 E12/L    Hemoglobin 16.0 12.5 - 16.5 g/dL    Hematocrit 50.5 37.0 - 54.0 %    MCV 96.0 80.0 - 99.9 fL    MCH 30.4 26.0 - 35.0 pg    MCHC 31.7 (L) 32.0 - 34.5 %    RDW 14.6 11.5 - 15.0 fL    Platelets 280 197 - 808 E9/L    MPV 11.8 7.0 - 12.0 fL   POCT Glucose   Result Value Ref Range    Meter Glucose 338 (H) 74 - 99 mg/dL   EKG 12 Lead   Result Value Ref Range    Ventricular Rate 100 BPM    Atrial Rate 100 BPM    P-R Interval 186 ms    QRS Duration 132 ms    Q-T Interval 396 ms    QTc Calculation (Bazett) 510 ms    P Axis 51 degrees    R Axis -54 degrees    T Axis 97 degrees       RADIOLOGY:  Interpreted by Radiologist.  XR CHEST (2 VW)   Final Result   1. Multifocal bilateral airspace disease   2. Possible underlying emphysema and interstitial lung disease. 3. Given that there are no prior studies available for review follow-up CT   scan is recommended for further evaluation. EKG Interpretation  Interpreted by emergency department physician    Rhythm: normal sinus   Rate: 100  Axis: left  Conduction: normal  ST Segments: nonspecific changes  T Waves: non specific changes    Clinical Impression: left ventricular hypertrophy  Comparison to prior EKG: None      ------------------------- NURSING NOTES AND VITALS REVIEWED ---------------------------   The nursing notes within the ED encounter and vital signs as below have been reviewed by myself. BP (!) 161/124   Pulse 94   Temp 97.3 °F (36.3 °C) (Tympanic)   Resp 22   Ht 6' 3\" (1.905 m)   Wt 290 lb (131.5 kg)   SpO2 96%   BMI 36.25 kg/m²   Oxygen Saturation Interpretation: Abnormal 90 % on RA    The patients available past medical records and past encounters were reviewed.         ------------------------------ ED COURSE/MEDICAL DECISION MAKING----------------------  Medications   furosemide (LASIX) injection 40 mg (has no administration in time range)   insulin regular (HUMULIN R;NOVOLIN R) injection 10 Units (has no administration in time range)             Medical Decision Making:    Patient presents with ongoing shortness of breath. Was recently admitted at [facility] found to have stage III diastolic heart failure. Patient left his medical advice presents today with worsening symptoms. Reviewed H&P ED records and hospital records from [facility]. Patient was given IV Lasix and insulin for his diabetes and congestive heart failure. Patient's agreeable to be admitted here. I did speak with hospitalist  [facility] accepted admission. Consultations pending with cardiology. Re-Evaluations:             Re-evaluation. Patients symptoms show no change      Consultations:             Dr. Lorenda Collet: NONE        This patient's ED course included: a personal history and physicial examination, re-evaluation prior to disposition, multiple bedside re-evaluations, IV medications, cardiac monitoring, continuous pulse oximetry, complex medical decision making and emergency management and a personal history and physicial eaxmination    This patient has remained hemodynamically stable, improved and been closely monitored during their ED course. Counseling: The emergency provider has spoken with the patient and discussed todays results, in addition to providing specific details for the plan of care and counseling regarding the diagnosis and prognosis. Questions are answered at this time and they are agreeable with the plan.       --------------------------------- IMPRESSION AND DISPOSITION ---------------------------------    IMPRESSION  1. Acute diastolic congestive heart failure ([address]        DISPOSITION  Disposition: Admit to telemetry  Patient condition is stable        NOTE: This report was transcribed using voice recognition software.  Every effort was made to ensure accuracy; however, inadvertent computerized transcription errors may be present        Cary Subramanian, DO  06/17/21 101

## 2021-06-17 NOTE — CONSULTS
Inpatient Cardiology Consultation      Reason for Consult:  COB, CHF    Consulting Physician: Aquiles Washburn MD    Requesting Physician:  Rachell Nicolas DO    Date of Consultation: 6/17/2021    HISTORY OF PRESENT ILLNESS OF Sussy Escamilla located in  room 21/21: Sussy Escamilla is a 63 y.o. male  unknown to [facility] cardiology     Patient has history of  HTN, insulin requiring DM, h/o Covid 19 pneumonia in November of 2020, obesity. ECHO 06/15/2021      Study Location: Echo Lab  Technical Quality: Poor visualization due to body habitus.     Indications:Congestive heart failure.     Patient Status: Routine     Contrast Medium: Definity.     Height: 75 inches Weight: 280 pounds BSA: 2.53 m^2 BMI: 35 kg/m^2     HR: 96 bpm BP: 144/96 mmHg      Findings      Left Ventricle   Left ventricle is moderately enlarged . Ejection fraction is visually estimated at 15-20%. Overall ejection fraction severely decreased . Severe global hypokinesis. Normal left ventricle wall thickness. Stage III diastolic dysfunction. Right Ventricle   Moderately dilated right ventricle. Right ventricle global systolic function is low normal . TAPSE 17 mm. Left Atrium   Left atrium is of normal size. Right Atrium   Moderately enlarged right atrium size. Mitral Valve   Mild mitral annular calcification. No evidence of mitral valve stenosis. Physiologic and/or trace mitral regurgitation is present. Tricuspid Valve   The tricuspid valve appears structurally normal.   Mild tricuspid regurgitation. RVSP is 58 mmHg. Pulmonary hypertension is moderate . Aortic Valve   The aortic valve appears severely sclerotic. Individual aortic valve leaflets are not clearly visualized. There is severe low flow, low gradient aortic stenosis with valve area of   0.8 sq cm by continuity equation. Aortic valve peak velocity 2.8 m/s, mean   gradient 19 mmHg, DVI 0.22, SVI 15 ml/m2.    No evidence of aortic valve regurgitation. Pulmonic Valve   The pulmonic valve was not well visualized. Physiologic and/or trace pulmonic regurgitation present. No evidence of pulmonic valve stenosis. Pericardial Effusion   No evidence for hemodynamically significant pericardial effusion. Pleural Effusion   No evidence of pleural effusion. Aorta   Normal aortic root and ascending aorta. Miscellaneous   Dilated Inferior Vena Cava. Inferior Vena Cava, normal respiratory variation. Conclusions      Summary   Left ventricle is moderately enlarged . Ejection fraction is visually estimated at 15-20%. Overall ejection fraction severely decreased . Severe global hypokinesis. Normal left ventricle wall thickness. Stage III diastolic dysfunction. Moderately dilated right ventricle. Right ventricle global systolic function is low normal . TAPSE 17 mm. There is severe low flow, low gradient aortic stenosis with valve area of   0.8 sq cm by continuity equation. Aortic valve peak velocity 2.8 m/s, mean   gradient 19 mmHg, DVI 0.22, SVI 15 ml/m2. Mild tricuspid regurgitation. RVSP is 58 mmHg. Pulmonary hypertension is moderate . No evidence for hemodynamically significant pericardial effusion. Patient initially presented to ED of [facility] on 6/15/2021 at about 10 0 6 AM complaining of shortness of breath she was admitted and her underwent an echo showing severe reduced left ventricle ejection fraction and severe aortic stenosis with low flow and low gradient. There was a plan to transfer him to [facility] for further evaluation and treatment however patient requested to be discharged to go home to arrange for prolonged stay in the hospital.  He presented today to ED of [facility] at about 7:55 AM for further evaluation and treatment, complaining shortness of breath, general weakness and leg swelling.     During the exam: patient was resting comfortable without any signs of distress; was cooperative; presented no complaints;  denied chest pain, shortness of breath at rest, shortness of breath at minimal effort, palpitations , lightheadedness, dizziness, cough, chills, fever, abdominal pains  and nausea . According to patient for about 2 to 3-year she noticed a decline of his capacity to tolerate effort. He was attributing this to aging process. Since November 2020 after Covid pneumonia he noticed a more quicker decline. He noticed a progressive shortness of breath at effort and a worsening general weakness. For the last month he was experience shortness of breath with minimal effort. He felt tired. For the last 10 days he noticed a significant increase of his leg swelling. So finally he decided to seek medical attention at ED. He denies CP, lightheadedness, syncope. Please note: past medical records were reviewed per electronic medical record (EMR) - see detailed reports under Past Medical/ Surgical History. Past Medical History:    Past Medical History:   Diagnosis Date    Hypertension        Past Surgical History:    No past surgical history on file. Medications Prior to admit:  Prior to Admission medications    Medication Sig Start Date End Date Taking?  Authorizing Provider   amLODIPine (NORVASC) 10 MG tablet Take 10 mg by mouth daily   Yes Historical Provider, MD   metoprolol succinate (TOPROL XL) 25 MG extended release tablet Take 1 tablet by mouth daily 6/17/21  Yes Amilcar Nolen MD   aspirin 81 MG EC tablet Take 81 mg by mouth daily   Yes Historical Provider, MD   insulin glargine (BASAGLAR KWIKPEN) 100 UNIT/ML injection pen Inject 50 Units into the skin nightly    Historical Provider, MD   glyBURIDE (DIABETA) 5 MG tablet Take 5 mg by mouth 2 times daily     Historical Provider, MD   lisinopril (PRINIVIL;ZESTRIL) 20 MG tablet Take 20 mg by mouth 2 times daily     Historical Provider, MD 62s, CABG. Mother  of cancer. Family History   Problem Relation Age of Onset    Other Mother         esophageal tumor    Heart Disease Father     Breast Cancer Sister     No Known Problems Brother     No Known Problems Brother     No Known Problems Sister          REVIEW OF SYSTEMS:     Constitutional: Has general fatigue and noticed weight gain. Denies  fevers, chills, night sweats, blanche loss,  HEENT: Denies headaches, nose bleeds, and blurred vision,oral pain, oral lesion. Neurological: Denies history of stroke, weakness, dizziness and lightheadedness, numbness and tingling  Cardiovascular: Has shortness of breath with minimal effort and sometimes at rest, also has leg swelling. Denies chest pain,  palpitations, and feelings of heart racing. Respiratory:. He has dry cough. Denies  wheezing,  use of supplementary oxygen, CPAP/BiPAP  Gastrointestinal: Denies heartburn, nausea, vomiting, diarrhea and constipation, black/bloody, and tarry stools. Genitourinary:  Denies pain on  urination,  blood in urine, trouble starting urination, need to strain on urination, urinary urgency, urinary incontinence. Hematologic:Denies history of easy bruising, prolonged bleeding,  of blood clots in legs  Lymphatic: Denies lumps and bumps to neck, axilla, breast, and groin  Endocrine: Denies excessive thirst. Denies intolerance to heat or cold  Musculoskeletal: Denies falls, pain to BLE with ambulation. Psychiatric: Denies anxiety and depression. PHYSICAL EXAM:   BP (!) 131/95   Pulse 82   Temp 97.3 °F (36.3 °C) (Tympanic)   Resp 27   Ht 6' 3\" (1.905 m)   Wt 290 lb (131.5 kg)   SpO2 96%   BMI 36.25 kg/m²   CONST: Obese. Awake, alert, cooperative, no apparent distress  HEENT:   Head- Normocephalic, atraumatic   Eyes- Conjunctivae pink, anicteric  Throat- Oral mucosa pink and moist  Neck-  No stridor, trachea midline, no jugular venous distention.  No adenopathy   CHEST: Chest symmetrical and Number      [id number]  Number   Account #      [de-identified]       Procedure Date   06/15/2021   Corporate ID                   Ordering         Philip Gray MD                                 Physician   Accession      [id number]       Referring        Lucia Lyons  Number                         Physician   Date of Birth  1960      Sonographer      Naomie Jean-Baptiste ARI   Age            63 year(s)      Interpreting     [address] [place] [address]                                 Physician        Physician Cardiology                                                  Lore Sandy MD                                  Any Other  Procedure Type of Study   TTE procedure:Echo Complete W/Doppler & Color Flow. Procedure Date Date: 06/15/2021 Start: 02:22 PM Study Location: Echo Lab Technical Quality: Poor visualization due to body habitus. Indications:Congestive heart failure. Patient Status: Routine Contrast Medium: Definity. Height: 75 inches Weight: 280 pounds BSA: 2.53 m^2 BMI: 35 kg/m^2 HR: 96 bpm BP: 144/96 mmHg  Findings   Left Ventricle  Left ventricle is moderately enlarged . Ejection fraction is visually estimated at 15-20%. Overall ejection fraction severely decreased . Severe global hypokinesis. Normal left ventricle wall thickness. Stage III diastolic dysfunction. Right Ventricle  Moderately dilated right ventricle. Right ventricle global systolic function is low normal . TAPSE 17 mm. Left Atrium  Left atrium is of normal size. Right Atrium  Moderately enlarged right atrium size. Mitral Valve  Mild mitral annular calcification. No evidence of mitral valve stenosis. Physiologic and/or trace mitral regurgitation is present. Tricuspid Valve  The tricuspid valve appears structurally normal.  Mild tricuspid regurgitation. RVSP is 58 mmHg. Pulmonary hypertension is moderate . Aortic Valve  The aortic valve appears severely sclerotic. Individual aortic valve leaflets are not clearly visualized.   There is severe low flow, low gradient aortic stenosis with valve area of  0.8 sq cm by continuity equation. Aortic valve peak velocity 2.8 m/s, mean  gradient 19 mmHg, DVI 0.22, SVI 15 ml/m2. No evidence of aortic valve regurgitation. Pulmonic Valve  The pulmonic valve was not well visualized. Physiologic and/or trace pulmonic regurgitation present. No evidence of pulmonic valve stenosis. Pericardial Effusion  No evidence for hemodynamically significant pericardial effusion. Pleural Effusion  No evidence of pleural effusion. Aorta  Normal aortic root and ascending aorta. Miscellaneous  Dilated Inferior Vena Cava. Inferior Vena Cava, normal respiratory variation. Conclusions   Summary  Left ventricle is moderately enlarged . Ejection fraction is visually estimated at 15-20%. Overall ejection fraction severely decreased . Severe global hypokinesis. Normal left ventricle wall thickness. Stage III diastolic dysfunction. Moderately dilated right ventricle. Right ventricle global systolic function is low normal . TAPSE 17 mm. There is severe low flow, low gradient aortic stenosis with valve area of  0.8 sq cm by continuity equation. Aortic valve peak velocity 2.8 m/s, mean  gradient 19 mmHg, DVI 0.22, SVI 15 ml/m2. Mild tricuspid regurgitation. RVSP is 58 mmHg. Pulmonary hypertension is moderate . No evidence for hemodynamically significant pericardial effusion.    Signature   ----------------------------------------------------------------  Electronically signed by YOEL MartinezInterpreting  physician) on 06/15/2021 04:39 PM  ----------------------------------------------------------------  M-Mode/2D Measurements & Calculations   LV Diastolic    LV Systolic Dimension: 6.1   AV Cusp Separation: 0.9 cmLA  Dimension: 6.4  cm                           Dimension: 5.8 cmAO Root  cm              LV Volume Diastolic: 071.4   Dimension: 3.1 cm  LV FS:4.7 %     ml  LV PW           LV Volume Systolic: 183.9 ml  Diastolic: 1 cm LV EDV/LV EDV Index: 437.7  LV PW Systolic: LZ/02 LL/D^9WU ESV/LV ESV    RV Diastolic Dimension: 4.1  1.2 cm          Index: 184.6 ml/73ml/ m^2    cm  Septum          EF Calculated: 38.0 %  Diastolic: 0.9  LV Mass Index: 102 l/min*m^2 LA/Aorta: 1.87  cm              LV Length: 8.5 cm            Ascending Aorta: 3.8 cm  Septum                                       LA volume/Index: 84 ml  Systolic: 1 cm  LVOT: 2.1 cm                 /33.1ml/m^2  CO: 3.72 l/min                               RA Area: 26.5 cm^2  CI: 1.47  l/m*m^2                                      IVC Expiration: 2.1 cm  LV Mass: 258.22  g  Doppler Measurements & Calculations   MV Peak E-Wave: 1.05 m/s   AV Peak Velocity:    LVOT Peak Velocity: 0.61  MV Peak A-Wave: 0.38 m/s   2.77 m/s             m/s  MV E/A Ratio: 2.77         AV Peak Gradient:    LVOT Mean Velocity: 0.43  MV Peak Gradient: 4.7 mmHg 30.71 mmHg           m/s  MV Mean Gradient: 1.5 mmHg AV Mean Velocity:    LVOT Peak Gradient: 1.5  MV Mean Velocity: 0.56 m/s 2.08 m/s             mmHgLVOT Mean Gradient:  MV Deceleration Time: 97.7 AV Mean Gradient:    0.9 mmHg  msec                       19.2 mmHg            Estimated RVSP: 57.8 mmHg  MV P1/2t: 33 msec          AV VTI: 49.4 cm      Estimated RAP:8 mmHg  MVA by PHT:6.67 cm^2       AV Area  MV Area (continuity): 2.3  (Continuity):0.78  cm^2                       cm^2                 TR Velocity:3.53 m/s  MV E' Septal Velocity:                          TR Gradient:49.76 mmHg  0.04 m/s                   LVOT VTI: 11.2 cm    PV Peak Velocity: 0.67 m/s  MV E' Lateral Velocity: 7  IVRT: 55.4 msec      PV Peak Gradient: 1.8 mmHg  m/s                        Estimated PASP:      PV Mean Velocity: 0.43 m/s  MR Velocity: 4.76 m/s      57.76 mmHg           PV Mean Gradient: 0.9 mmHg  MV YOAN PISA: 0.22 cm^2  MR VTI: 140.5 cm                                FL ED Velocity: 1.01 m/s  Alias Velocity: 0.21  m/sPISA Radius: 0.9 cm PISA area: 5.09 cm^2MR  flow rate: 104.34 ml/sMR  volume:30.91 ml  [email or web address] [id number]        XR CHEST PORTABLE    Result Date: 6/15/2021  EXAMINATION: ONE XRAY VIEW OF THE CHEST 6/15/2021 11:30 am COMPARISON: None. HISTORY: ORDERING SYSTEM PROVIDED HISTORY: short of breath TECHNOLOGIST PROVIDED HISTORY: Reason for exam:->short of breath FINDINGS: Interstitial and hazy opacities bilaterally. Mild cardiomegaly. No obvious pleural effusion. No pneumothorax. Chronic appearing right clavicle fracture. Chronic right 3rd rib fracture. Interstitial and hazy opacities bilaterally suggestive of pulmonary vascular congestion and developing interstitial pulmonary edema or peribronchial inflammatory changes. Labs:   CARDIAC ENZYMES:  Recent Labs     06/15/21  1107 06/17/21  0839   TROPHS 24* 26*     H/O TROPONIN levels    Lab Results   Component Value Date    TROPHS 26 06/17/2021    TROPHS 24 06/15/2021     Recent Labs     06/15/21  1107 06/17/21  0839   PROBNP 4,310* 5,826*     Lab Results   Component Value Date    PROBNP 5,826 06/17/2021    PROBNP 4,310 06/15/2021     BMP:   Recent Labs     06/16/21  1008 06/17/21  0839    136   K 4.0 4.6   CO2 25 27   BUN 12 18   CREATININE 1.1 1.1   LABGLOM >60 >60   CALCIUM 9.1 9.3     Lab Results   Component Value Date    CREATININE 1.1 06/17/2021    CREATININE 1.1 06/16/2021    CREATININE 1.0 06/15/2021    CREATININE 0.8 04/13/2012     CBC:   Recent Labs     06/16/21  1008 06/17/21  0839   WBC 9.1 9.0   HGB 15.2 16.0   HCT 46.0 50.5    179     Mag:   Recent Labs     06/15/21  1107   MG 1.7     Phos: No results for input(s): PHOS in the last 72 hours. TSH: No results for input(s): TSH in the last 72 hours.   HgA1c:   Lab Results   Component Value Date    LABA1C 8.1 (H) 06/15/2021     No results found for: EAG  BNP: No results for input(s): BNP in the last 72 hours. PT/INR: No results for input(s): PROTIME, INR in the last 72 hours. APTT:No results for input(s): APTT in the last 72 hours. FASTING LIPID PANEL:No results found for: CHOL, HDL, LDLDIRECT, LDLCALC, TRIG  LIVER PROFILE:  Recent Labs     06/15/21  1107   AST 23   ALT 32   LABALBU 3.5               ASSESSMENT:  Severe aortic stenosis with low flow and low gradient-newly diagnosed (ECHO 6/15/2021)  Cardiomyopathy of unclear etiology with EF 15-20% newly diagnosed (ECHO 6/15/2021),    HTN  Insulin requiring DM  S/p Covid 19 pneumonia in November 2020  Obesity, BMI 36.37 kg/m²    PLAN:  Continue Telemetry  Continue IV diuresis-Lasix 40 mg IV twice daily  Started Aldactone 25 mg daily  Continue Toprol-XL 25 mg daily  Continue lisinopril 20 mg twice daily  Continue aspirin 81 mg daily  Electrolytes check and correction    Renal function check   I&O, weights  BP control   Plan for heart cath on Monday for evaluation of etiology of cardiomyopathy  Further recommendation upon the results of heart cath. Assessment and plan to follow as per Dr. Tianna Beltre MD    Electronically signed by JASMINE Ray on 6/17/2021 at 2:11 PM     ______________________________________________________________________  Cardiology attending attestation:  I have independently interviewed and examined the patient. I personally reviewed pertinent laboratory values and diagnostic testing (including, if applicable, chest xray, electrocardiogram, telemetry, echocardiogram, stress testing, and coronary angiography). I have reviewed the above documentation completed by JASMINE Romero including past medical, social, and family history and agree with the findings, assessment and plan except where noted. Plan formulated under my direct supervision. I participated in all aspects of the medical decision making.   Please see my additional contributions to the HPI, physical exam, and assessment / medical decision sacubitril/valsartan but likely to be cost prohibitive without prescription coverage  ? Add spironolactone  ? Consider SGLT2 inhibitor  · Left heart catheterization Monday to determine coronary status and rule out ischemic etiology of cardiomyopathy  · Will eventually need dobutamine stress echo to confirm severity of low-flow low gradient aortic stenosis  · Further recommendations pending above  · Consider wearable cardioverter defibrillator  · Aggressive risk factor modification     Thank you for the consultation.   Please do not hesitate to call with questions.     Jacquelin Patel MD, [facility] Cardiology

## 2021-06-17 NOTE — CONSULTS
______________________________________________________________________  Cardiology attending attestation:  I have independently interviewed and examined the patient. I personally reviewed pertinent laboratory values and diagnostic testing (including, if applicable, chest xray, electrocardiogram, telemetry, echocardiogram, stress testing, and coronary angiography). I have reviewed the above documentation completed by JASMINE Wade including past medical, social, and family history and agree with the findings, assessment and plan except where noted. Plan formulated under my direct supervision. I participated in all aspects of the medical decision making. Please see my additional contributions to the HPI, physical exam, and assessment / medical decision making:  _______________________________________________________________________    Patient presenting with several weeks of worsening shortness of breath and lower extremity edema and significant exercise intolerance. Generally feeling unwell since COVID-19 in November 2020. Went to [facility] found to have severe LV dysfunction with EF of 15 to 20% low-flow low gradient severe aortic stenosis. Denies chest pain. Physical Exam:  BP (!) 162/120   Pulse 91   Temp 98.1 °F (36.7 °C) (Temporal)   Resp 19   Ht 6' 3\" (1.905 m)   Wt 291 lb (132 kg)   SpO2 96%   BMI 36.37 kg/m²   General appearance: Awake, alert, no acute respiratory distress  Skin: Intact, no rash  ENMT: Moist mucous membranes  Neck: Supple, no carotid bruits. Normal jugular venous pressure  Lungs: Bibasilar rales  Cardiac: Regular rhythm with a normal rate, normal C0&U2, systolic murmur right sternal border  Abdomen: Soft, positive bowel sounds, nontender  Extremities: Moves all extremities x 4, 2+ pitting bilateral lower extremity edema  Neurologic: No focal motor deficits apparent, normal mood and affect    Telemetry: Sinus rhythm  EKG:Sinus rhythm PACs.   100 bpm.  Left axis deviation. IVCD QRS duration 132 ms. Possible prior anterior MI. Possible prior inferior MI. Impression:   1. Acute heart failure reduced ejection fraction. proBNP 5800  2. Cardiomyopathy, EF 15 to 20%. Valvular, possibly ischemic  3. Low-flow low gradient severe aortic stenosis  4. Hypertension  5. Type 2 diabetes, insulin requiring. A1c 8.1%  6. Obesity BMI 36.4 kg/m²  7. COVID-19 infection November 2020    Recommendations:     Continue IV furosemide 40 bid   Strict I's and O's   Guideline directed medical therapy for cardiomyopathy as follows  o Discontinue amlodipine, there is no utility for this  o Continue metoprolol succinate and uptitrate as tolerated  o Continue lisinopril and uptitrate as tolerated, consider transition to sacubitril/valsartan but likely to be cost prohibitive without prescription coverage  o Add spironolactone  o Consider SGLT2 inhibitor   Left heart catheterization Monday to determine coronary status and rule out ischemic etiology of cardiomyopathy   Will eventually need dobutamine stress echo to confirm severity of low-flow low gradient aortic stenosis   Further recommendations pending above   Consider wearable cardioverter defibrillator   Aggressive risk factor modification    Thank you for the consultation. Please do not hesitate to call with questions.     Abdullahi Costa MD, [facility] Cardiology

## 2021-06-17 NOTE — H&P
Allergies:  Patient has no known allergies. Social History:   TOBACCO:   reports that he quit smoking about 20 years ago. His smoking use included cigarettes. He started smoking about 48 years ago. He smoked 1.50 packs per day. He has never used smokeless tobacco.  ETOH:   reports current alcohol use. MARITAL STATUS:    OCCUPATION:      Family History:       Problem Relation Age of Onset    Other Mother         esophageal tumor    Heart Disease Father     Breast Cancer Sister     No Known Problems Brother     No Known Problems Brother     No Known Problems Sister        REVIEW OF SYSTEMS:    General ROS: positive for  - fatigue and malaise, shortness of breath   Hematological and Lymphatic ROS: negative  Endocrine ROS: negative  Respiratory ROS: no cough, shortness of breath, or wheezing  Cardiovascular ROS: no chest pain or dyspnea on exertion  Gastrointestinal ROS: no abdominal pain, change in bowel habits, or black or bloody stools  Genito-Urinary ROS: no dysuria, trouble voiding, or hematuria  Neurological ROS: no TIA or stroke symptoms  negative    Vitals:  BP (!) 154/120   Pulse 92   Temp 97.9 °F (36.6 °C) (Temporal)   Resp 18   Ht 6' 3\" (1.905 m)   Wt 291 lb (132 kg)   SpO2 98%   BMI 36.37 kg/m²     PHYSICAL EXAM:  General:  Awake, alert, oriented X 3. Well developed, well nourished, well groomed. No apparent distress. HEENT:  Normocephalic, atraumatic. Pupils equal, round, reactive to light. No scleral icterus. No conjunctival injection. Normal lips, teeth, and gums. No nasal discharge. Neck:  Supple, FROM  Heart:  RRR, no murmurs, gallops, rubs, carotid upstroke normal, no carotid bruits  Lungs:  Rales   Abdomen:   Bowel sounds present, soft, nontender, no masses, no organomegaly, no peritoneal signs  Extremities: pitting edema bl legs   Skin:  Warm and dry, no open lesions or rash  Neuro:  Cranial nerves 2-12 intact, no focal deficits  Vascular: Radial and pedal Pulses 2+  Breast: deferred  Rectal: deferred  Genitalia:  deferred      DATA:     Recent Labs     06/15/21  1107 06/16/21  1008 06/17/21  0839   WBC 9.3 9.1 9.0   HGB 16.1 15.2 16.0    175 179     Recent Labs     06/15/21  1107 06/16/21  1008 06/17/21  0839    138 136   K 4.0 4.0 4.6   BUN 11 12 18   CREATININE 1.0 1.1 1.1     Recent Labs     06/15/21  1107   PROT 6.7     Recent Labs     06/15/21  1107   AST 23   ALT 32   ALKPHOS 91   BILITOT 1.0     No results for input(s): BNP in the last 72 hours. No results for input(s): CKTOTAL, CKMB, CKMBINDEX, TROPONINI in the last 72 hours. ASSESSMENT:      Active Problems:    Heart failure ([address]  Resolved Problems:    * No resolved hospital problems.  *        PLAN:    Admit to tele for evaluation of new  [facility] to assess for ischemic etiology   Life vest , GDMT after cath if needed  Cards consult   Monitor labs and vitals   Stop drinking energy drinks   a1c and lipid panel   May benefit from antidepressant     DVT prophylaxis  PT OT  Discharge plan    Caleb Monsivais MD  6/17/2021  7:10 PM

## 2021-06-18 LAB
ANION GAP SERPL CALCULATED.3IONS-SCNC: 8 MMOL/L (ref 7–16)
BUN BLDV-MCNC: 19 MG/DL (ref 6–23)
CALCIUM SERPL-MCNC: 9.3 MG/DL (ref 8.6–10.2)
CHLORIDE BLD-SCNC: 98 MMOL/L (ref 98–107)
CHOLESTEROL, TOTAL: 124 MG/DL (ref 0–199)
CO2: 30 MMOL/L (ref 22–29)
CREAT SERPL-MCNC: 1.3 MG/DL (ref 0.7–1.2)
GFR AFRICAN AMERICAN: >60
GFR NON-AFRICAN AMERICAN: 56 ML/MIN/1.73
GLUCOSE BLD-MCNC: 284 MG/DL (ref 74–99)
HDLC SERPL-MCNC: 35 MG/DL
LDL CHOLESTEROL CALCULATED: 75 MG/DL (ref 0–99)
MAGNESIUM: 1.8 MG/DL (ref 1.6–2.6)
METER GLUCOSE: 241 MG/DL (ref 74–99)
POTASSIUM SERPL-SCNC: 4.2 MMOL/L (ref 3.5–5)
SODIUM BLD-SCNC: 136 MMOL/L (ref 132–146)
TRIGL SERPL-MCNC: 70 MG/DL (ref 0–149)
VLDLC SERPL CALC-MCNC: 14 MG/DL

## 2021-06-18 PROCEDURE — 83735 ASSAY OF MAGNESIUM: CPT

## 2021-06-18 PROCEDURE — 80048 BASIC METABOLIC PNL TOTAL CA: CPT

## 2021-06-18 PROCEDURE — 80061 LIPID PANEL: CPT

## 2021-06-18 PROCEDURE — 2140000000 HC CCU INTERMEDIATE R&B

## 2021-06-18 PROCEDURE — 6360000002 HC RX W HCPCS: Performed by: INTERNAL MEDICINE

## 2021-06-18 PROCEDURE — 2580000003 HC RX 258: Performed by: FAMILY MEDICINE

## 2021-06-18 PROCEDURE — 6370000000 HC RX 637 (ALT 250 FOR IP): Performed by: FAMILY MEDICINE

## 2021-06-18 PROCEDURE — 6370000000 HC RX 637 (ALT 250 FOR IP): Performed by: INTERNAL MEDICINE

## 2021-06-18 PROCEDURE — 36415 COLL VENOUS BLD VENIPUNCTURE: CPT

## 2021-06-18 PROCEDURE — 82962 GLUCOSE BLOOD TEST: CPT

## 2021-06-18 PROCEDURE — 6360000002 HC RX W HCPCS: Performed by: FAMILY MEDICINE

## 2021-06-18 PROCEDURE — 99233 SBSQ HOSP IP/OBS HIGH 50: CPT | Performed by: INTERNAL MEDICINE

## 2021-06-18 RX ADMIN — METOPROLOL SUCCINATE 25 MG: 25 TABLET, EXTENDED RELEASE ORAL at 08:32

## 2021-06-18 RX ADMIN — FUROSEMIDE 40 MG: 10 INJECTION, SOLUTION INTRAVENOUS at 18:39

## 2021-06-18 RX ADMIN — FUROSEMIDE 40 MG: 10 INJECTION, SOLUTION INTRAVENOUS at 08:32

## 2021-06-18 RX ADMIN — LISINOPRIL 20 MG: 20 TABLET ORAL at 08:32

## 2021-06-18 RX ADMIN — ENOXAPARIN SODIUM 40 MG: 40 INJECTION SUBCUTANEOUS at 08:32

## 2021-06-18 RX ADMIN — LISINOPRIL 20 MG: 20 TABLET ORAL at 21:22

## 2021-06-18 RX ADMIN — SPIRONOLACTONE 25 MG: 25 TABLET ORAL at 08:32

## 2021-06-18 RX ADMIN — Medication 10 ML: at 08:32

## 2021-06-18 RX ADMIN — Medication 10 ML: at 21:22

## 2021-06-18 RX ADMIN — GLIPIZIDE 5 MG: 5 TABLET ORAL at 06:31

## 2021-06-18 RX ADMIN — ASPIRIN 81 MG: 81 TABLET, COATED ORAL at 08:32

## 2021-06-18 RX ADMIN — GLIPIZIDE 5 MG: 5 TABLET ORAL at 17:15

## 2021-06-18 ASSESSMENT — PAIN SCALES - GENERAL
PAINLEVEL_OUTOF10: 0

## 2021-06-18 NOTE — PLAN OF CARE
Problem: OXYGENATION/RESPIRATORY FUNCTION  Goal: Patient will maintain patent airway  Outcome: Met This Shift     Problem: HEMODYNAMIC STATUS  Goal: Patient has stable vital signs and fluid balance  Outcome: Met This Shift

## 2021-06-18 NOTE — PROGRESS NOTES
INPATIENT CARDIOLOGY FOLLOW-UP    Name: Karla Garay    Age: 63 y.o. Date of Admission: 6/17/2021  8:18 AM    Date of Service: 6/18/2021    Primary Cardiologist: Known to me from this admission    Chief Complaint: Follow-up for new onset HFrEF, aortic stenosis    Interim History:  Feeling better. Diuresing well. Denies chest pain or shortness of breath at rest.    Still short of breath with exertion.     Review of Systems:   Negative except as described above    Problem List:  Patient Active Problem List   Diagnosis    Acute systolic congestive heart failure (HCC)    Class 2 severe obesity due to excess calories with serious comorbidity in adult [facility])    Uncontrolled type 2 diabetes mellitus with hyperglycemia [facility]    Essential hypertension    Severe aortic stenosis    Acute systolic CHF (congestive heart failure) ([facility]    Heart failure (HCC)       Current Medications:    Current Facility-Administered Medications:     aspirin EC tablet 81 mg, 81 mg, Oral, Daily, Gene Ghoshr Chris, APRN - CNP, 81 mg at 06/18/21 0832    lisinopril (PRINIVIL;ZESTRIL) tablet 20 mg, 20 mg, Oral, BID, Gene Ghoshr Chris, APRN - CNP, 20 mg at 06/18/21 8565    metoprolol succinate (TOPROL XL) extended release tablet 25 mg, 25 mg, Oral, Daily, Gene Ghoshr Learn, APRN - CNP, 25 mg at 06/18/21 8376    sodium chloride flush 0.9 % injection 5-40 mL, 5-40 mL, Intravenous, 2 times per day, Gene Ghoshr Learn, APRN - CNP, 10 mL at 06/18/21 0832    sodium chloride flush 0.9 % injection 10 mL, 10 mL, Intravenous, PRN, Gene Ghoshr Chris, APRN - CNP    0.9 % sodium chloride infusion, 25 mL, Intravenous, PRN, Gene Perez, APRN - CNP    ondansetron (ZOFRAN-ODT) disintegrating tablet 4 mg, 4 mg, Oral, Q8H PRN **OR** ondansetron (ZOFRAN) injection 4 mg, 4 mg, Intravenous, Q6H PRN, Gene Ghoshr Chris, APRN - CNP    magnesium hydroxide (MILK OF MAGNESIA) 400 MG/5ML suspension 30 mL, 30 mL, Oral, Daily PRN, Gnee Perez, APRN - CNP    acetaminophen (TYLENOL) tablet 650 mg, 650 mg, Oral, Q6H PRN **OR** acetaminophen (TYLENOL) suppository 650 mg, 650 mg, Rectal, Q6H PRN, Geralene Mort Learn, APRN - CNP    enoxaparin (LOVENOX) injection 40 mg, 40 mg, Subcutaneous, Daily, Geralene Mort Learn, APRN - CNP, 40 mg at 06/18/21 4436    glipiZIDE (GLUCOTROL) tablet 5 mg, 5 mg, Oral, BID AC, Geralene Mort Learn, APRN - CNP, 5 mg at 06/18/21 0631    furosemide (LASIX) injection 40 mg, 40 mg, Intravenous, BID, Nicola Johnston MD, 40 mg at 06/18/21 8302    spironolactone (ALDACTONE) tablet 25 mg, 25 mg, Oral, Daily, Nicola Johnston MD, 25 mg at 06/18/21 7821    Physical Exam:  BP (!) 133/90   Pulse 79   Temp 97.2 °F (36.2 °C) (Temporal)   Resp 20   Ht 6' 3\" (1.905 m)   Wt 285 lb 9.6 oz (129.5 kg)   SpO2 95%   BMI 35.70 kg/m²   Wt Readings from Last 3 Encounters:   06/18/21 285 lb 9.6 oz (129.5 kg)   06/16/21 296 lb 12.8 oz (134.6 kg)     Appearance: Awake, alert, no acute respiratory distress  Skin: Intact, no rash  Head: Normocephalic, atraumatic  Eyes: EOMI, no conjunctival erythema  ENMT: No pharyngeal erythema, MMM, no rhinorrhea  Neck: Supple, no elevated JVP, no carotid bruits  Lungs: Clear to auscultation bilaterally. No wheezes, rales, or rhonchi. Cardiac: PMI nondisplaced, Regular rhythm with a normal rate, S1 & S2 normal, very minimal systolic murmur with preserved S2.  Murmur not characteristic of aortic stenosis  Abdomen: Soft, nontender, +bowel sounds  Extremities: Moves all extremities x 4, 2+ pitting edema bilateral lower extremity edema  Neurologic: No focal motor deficits apparent, normal mood and affect  Peripheral Pulses: Intact posterior tibial pulses bilaterally    Intake/Output:    Intake/Output Summary (Last 24 hours) at 6/18/2021 1633  Last data filed at 6/18/2021 1429  Gross per 24 hour   Intake 360 ml   Output 6075 ml   Net -5715 ml     No intake/output data recorded.     Laboratory Tests:  Recent Labs Moderately dilated right ventricle. Right ventricle global systolic function is low normal . TAPSE 17 mm. There is severe low flow, low gradient aortic stenosis with valve area of   0.8 sq cm by continuity equation. Aortic valve peak velocity 2.8 m/s, mean   gradient 19 mmHg, DVI 0.22, SVI 15 ml/m2. Mild tricuspid regurgitation. RVSP is 58 mmHg. Pulmonary hypertension is moderate . No evidence for hemodynamically significant pericardial effusion. Stress test:      Cardiac catheterization:     ----------------------------------------------------------------------------------------------------------------------------------------------------------------  IMPRESSION:  Acute heart failure reduced ejection fraction. proBNP 5800  Cardiomyopathy, EF 15 to 20%. Valvular, possibly ischemic  Low-flow low gradient severe versus pseudosevere aortic stenosis  Hypertension  Type 2 diabetes, insulin requiring. A1c 8.1%  Obesity BMI 36.4 kg/m²  COVID-19 infection November 2020  IQRA creatinine 1.3    RECOMMENDATIONS:  Diuresing well, still overloaded. Would not yet slow down on diuresis with the slight bump in creatinine.     Continue IV furosemide 40 bid  Strict I's and O's  Closely monitor renal function and electrolytes  Guideline directed medical therapy for cardiomyopathy as follows  Discontinue amlodipine, there is no utility for this  Continue metoprolol succinate and uptitrate as tolerated  Continue lisinopril and uptitrate as tolerated, consider transition to sacubitril/valsartan but likely to be cost prohibitive without prescription coverage  Add spironolactone  Consider SGLT2 inhibitor  Left heart catheterization Monday to determine coronary status and rule out ischemic etiology of cardiomyopathy  Dobutamine stress echo to clarify severe or pseudosevere LFLG aortic stenosis  Further recommendations pending above  Consider wearable cardioverter defibrillator upon discharge  Consider outpatient sleep

## 2021-06-18 NOTE — PROGRESS NOTES
Subjective: The patient is awake and alert. No acute events overnight. Denies chest pain, angina, SOB     Objective:    BP (!) 139/102   Pulse 85   Temp 97.7 °F (36.5 °C) (Temporal)   Resp 18   Ht 6' 3\" (1.905 m)   Wt 285 lb 9.6 oz (129.5 kg)   SpO2 96%   BMI 35.70 kg/m²     In: 180 [P.O.:180]  Out: 6075   In: 180   Out: 6075 [Urine:6075]    General appearance: NAD, conversant  HEENT: AT/NC, MMM  Neck: FROM, supple  Lungs: Clear to auscultation  CV: RRR, no MRGs  Vasc: Radial pulses 2+  Abdomen: Soft, non-tender; no masses or HSM  Extremities: No peripheral edema or digital cyanosis  Skin: no rash, lesions or ulcers  Psych: Alert and oriented to person, place and time  Neuro: Alert and interactive     Recent Labs     06/16/21  1008 06/17/21  0839   WBC 9.1 9.0   HGB 15.2 16.0   HCT 46.0 50.5    179       Recent Labs     06/16/21  1008 06/17/21  0839 06/18/21  0431    136 136   K 4.0 4.6 4.2    99 98   CO2 25 27 30*   BUN 12 18 19   CREATININE 1.1 1.1 1.3*   CALCIUM 9.1 9.3 9.3       Assessment:    Active Problems:    Heart failure (HCC)  Resolved Problems:    * No resolved hospital problems.  *      Plan:  Admit to tele for evaluation of new  [facility] to assess for ischemic etiology-scheduled for Monday   life vest , GDMT to include Entresto (renal function permitting), diuretic, Aldactone   Blood pressure better today with initiation of lisinopril diuretic beta-blocker  Cards following  Monitor labs and vitals   Stop drinking energy drinks   a1c and lipid panel-normal -A1c pending-blood sugars are uncontrolled  Increase glipizide to 10 p.o. twice daily and continue insulin sliding (will not add Metformin at this point secondary to anticipated left heart cath on Monday)   May benefit from antidepressant     DVT Prophylaxis   PT/OT  Discharge Marya Hammer MD  12:26 PM  6/18/2021

## 2021-06-18 NOTE — PLAN OF CARE
Patient's chart updated to reflect:      . - HF care plan, HF education points and HF discharge instructions.  -Orders: 2 gram sodium diet, daily weights, I/O.  -PCP and cardiology follow up appointments to be scheduled within 7 days of hospital discharge. Secure email sent to [facility] cardiology for scheduling of hospital follow up appointment. -CHF education session will be provided to the patient prior to hospital discharge.     Rachel Cedillo RN, BSN  Heart Failure Navigator

## 2021-06-18 NOTE — CARE COORDINATION
Transition of care: Cardio consulted. IV lasix 40mg twice a day. Pt for [facility] on Monday. Met with pt in room. Pt lives alone in ground floor apartment. Has 1 step to enter. Independent with ADLs and drives. Pt is a . DME- glucometer. Will check with Public Benefits about medicaid status. Discharge plan is to return home when medically ready. Voiced no needs for home at present. Pt may need a life vest at [place]. States has a son and daughter who live locally. His daughter helps him out as needed at home. PCP is Dr. Kyler Byrd and pharmacy is [facility] on [address] Mary Whitten will follow     [address]  Per Public Benefits, pt is HFA eligibile but is NOT eligible for help with meds at [place].  [employer] employee pharmacy notified via voicemail

## 2021-06-19 LAB
ANION GAP SERPL CALCULATED.3IONS-SCNC: 12 MMOL/L (ref 7–16)
BUN BLDV-MCNC: 18 MG/DL (ref 6–23)
CALCIUM SERPL-MCNC: 9.5 MG/DL (ref 8.6–10.2)
CHLORIDE BLD-SCNC: 97 MMOL/L (ref 98–107)
CO2: 33 MMOL/L (ref 22–29)
CREAT SERPL-MCNC: 1.1 MG/DL (ref 0.7–1.2)
GFR AFRICAN AMERICAN: >60
GFR NON-AFRICAN AMERICAN: >60 ML/MIN/1.73
GLUCOSE BLD-MCNC: 269 MG/DL (ref 74–99)
MAGNESIUM: 2 MG/DL (ref 1.6–2.6)
METER GLUCOSE: 261 MG/DL (ref 74–99)
POTASSIUM SERPL-SCNC: 4 MMOL/L (ref 3.5–5)
PRO-BNP: 3521 PG/ML (ref 0–125)
SODIUM BLD-SCNC: 142 MMOL/L (ref 132–146)

## 2021-06-19 PROCEDURE — 80048 BASIC METABOLIC PNL TOTAL CA: CPT

## 2021-06-19 PROCEDURE — 83880 ASSAY OF NATRIURETIC PEPTIDE: CPT

## 2021-06-19 PROCEDURE — 6360000002 HC RX W HCPCS: Performed by: INTERNAL MEDICINE

## 2021-06-19 PROCEDURE — 6370000000 HC RX 637 (ALT 250 FOR IP): Performed by: INTERNAL MEDICINE

## 2021-06-19 PROCEDURE — 6360000002 HC RX W HCPCS: Performed by: FAMILY MEDICINE

## 2021-06-19 PROCEDURE — 2140000000 HC CCU INTERMEDIATE R&B

## 2021-06-19 PROCEDURE — 82962 GLUCOSE BLOOD TEST: CPT

## 2021-06-19 PROCEDURE — 2580000003 HC RX 258: Performed by: FAMILY MEDICINE

## 2021-06-19 PROCEDURE — 83735 ASSAY OF MAGNESIUM: CPT

## 2021-06-19 PROCEDURE — 36415 COLL VENOUS BLD VENIPUNCTURE: CPT

## 2021-06-19 PROCEDURE — 6370000000 HC RX 637 (ALT 250 FOR IP): Performed by: FAMILY MEDICINE

## 2021-06-19 PROCEDURE — 99233 SBSQ HOSP IP/OBS HIGH 50: CPT | Performed by: INTERNAL MEDICINE

## 2021-06-19 RX ORDER — METOPROLOL SUCCINATE 50 MG/1
50 TABLET, EXTENDED RELEASE ORAL DAILY
Status: DISCONTINUED | OUTPATIENT
Start: 2021-06-20 | End: 2021-06-23 | Stop reason: HOSPADM

## 2021-06-19 RX ADMIN — FUROSEMIDE 40 MG: 10 INJECTION, SOLUTION INTRAVENOUS at 17:51

## 2021-06-19 RX ADMIN — SPIRONOLACTONE 25 MG: 25 TABLET ORAL at 08:19

## 2021-06-19 RX ADMIN — Medication 10 ML: at 08:21

## 2021-06-19 RX ADMIN — Medication 10 ML: at 20:20

## 2021-06-19 RX ADMIN — LISINOPRIL 20 MG: 20 TABLET ORAL at 20:19

## 2021-06-19 RX ADMIN — LISINOPRIL 20 MG: 20 TABLET ORAL at 08:19

## 2021-06-19 RX ADMIN — FUROSEMIDE 40 MG: 10 INJECTION, SOLUTION INTRAVENOUS at 08:19

## 2021-06-19 RX ADMIN — ENOXAPARIN SODIUM 40 MG: 40 INJECTION SUBCUTANEOUS at 08:19

## 2021-06-19 RX ADMIN — ASPIRIN 81 MG: 81 TABLET, COATED ORAL at 08:19

## 2021-06-19 RX ADMIN — GLIPIZIDE 5 MG: 5 TABLET ORAL at 06:39

## 2021-06-19 RX ADMIN — METOPROLOL SUCCINATE 25 MG: 25 TABLET, EXTENDED RELEASE ORAL at 08:19

## 2021-06-19 RX ADMIN — GLIPIZIDE 5 MG: 5 TABLET ORAL at 16:42

## 2021-06-19 ASSESSMENT — PAIN SCALES - GENERAL
PAINLEVEL_OUTOF10: 0
PAINLEVEL_OUTOF10: 0

## 2021-06-19 NOTE — PROGRESS NOTES
Subjective: The patient is awake and alert. Resting comfortably no apparent acute distress  Has been out and ambulating in the halls without any issues  Objective:    BP (!) 140/97   Pulse 89   Temp 97.7 °F (36.5 °C) (Oral)   Resp 20   Ht 6' 3\" (1.905 m)   Wt 279 lb (126.6 kg)   SpO2 96%   BMI 34.87 kg/m²     In: 1380 [P.O.:1380]  Out: 3700   In: 1380   Out: 3700 [Urine:3700]    General appearance: NAD, conversant,   HEENT: AT/NC, MMM  Neck: FROM, supple  Lungs: Clear to auscultation  CV: RRR, no MRGs  Vasc: Radial pulses 2+  Abdomen: Soft, non-tender; no masses or HSM  Extremities: No peripheral edema or digital cyanosis  Skin: no rash, lesions or ulcers  Psych: Alert and oriented to person, place and time, very sad  Neuro: Alert and interactive     Recent Labs     06/17/21  0839   WBC 9.0   HGB 16.0   HCT 50.5          Recent Labs     06/17/21  0839 06/18/21  0431 06/19/21  0631    136 142   K 4.6 4.2 4.0   CL 99 98 97*   CO2 27 30* 33*   BUN 18 19 18   CREATININE 1.1 1.3* 1.1   CALCIUM 9.3 9.3 9.5       Assessment:    Active Problems:    Heart failure (HCC)  Resolved Problems:    * No resolved hospital problems.  *      Plan:  Admit to tele for evaluation of new  [facility] to assess for ischemic etiology-scheduled for Monday   life vest , GDMT to include Entresto (renal function permitting), diuretic, Aldactone   Blood pressure better today with initiation of lisinopril diuretic beta-blocker  Cards following - stress test /6/21/2021  Monitor labs and vitals   Stop drinking energy drinks   a1c and lipid panel-normal -A1c pending-blood sugars are uncontrolled  Increase glipizide to 10 p.o. twice daily and continue insulin sliding (will not add Metformin at this point secondary to anticipated left heart cath on Monday)   May benefit from antidepressant -     DVT Prophylaxis   PT/OT  Discharge planning     SHIRA Hernandez CNP  12:30 PM  6/19/2021       Above edited by me  Now on dobutamine drip-should help him quite a bit  Await left heart cath on Monday  Patient ambulating without any dyspnea or shortness of breath today    I personally saw, examined and provided care for the patient. Radiographs, labs and medication list were reviewed by me independently. The case was discussed in detail and plans for care were established. Review of [address] [place] [address], documentation was conducted and revisions were made as appropriate directly by me. I agree with the above documented exam, problem list, and plan of care.      Grace Nickerson MD  1:25 PM  6/19/2021

## 2021-06-19 NOTE — DISCHARGE INSTR - DIET
 Good nutrition is important when healing from an illness, injury, or surgery. Follow any nutrition recommendations given to you during your hospital stay.  If you were given an oral nutrition supplement while in the hospital, continue to take this supplement at home. You can take it with meals, in-between meals, and/or before bedtime. These supplements can be purchased at most local grocery stores, pharmacies, and chain super-stores.  If you have any questions about your diet or nutrition, call the hospital and ask for the dietitian. A heart-healthy and consistent carbohydrate diet is recommended to manage heart disease and diabetes. To follow a heart-healthy and consistent carbohydrate diet,    Eat a balanced diet with whole grains, fruits and vegetables, and lean protein sources. Choose heart-healthy unsaturated fats. Limit saturated fats, trans fats, and cholesterol intake. Eat more plant-based or vegetarian meals using beans and soy foods for protein. Eat whole, unprocessed foods to limit the amount of sodium (salt) you eat. Choose a consistent amount of carbohydrate at each meal and snack. Limit refined carbohydrates especially sugar, sweets and sugar-sweetened beverages. If you drink alcohol, do so in moderation: one serving per day (women) and two servings per day (men). o One serving is equivalent to 12 ounces beer, 5 ounces wine, or 1.5 ounces distilled spirits    Tips  Tips for Choosing Heart-Healthy Fats  Choose lean protein and low-fat dairy foods to reduce saturated fat intake. Saturated fat is usually found in animal-based protein and is associated with certain health risks. Saturated fat is the biggest contributor to raise low-density lipoprotein (LDL) cholesterol levels. Research shows that limiting saturated fat lowers unhealthy cholesterol levels. Eat no more than 7% of your total calories each day from saturated fat.  Ask your RDN to help you determine how much saturated fat is right for you. There are many foods that do not contain large amounts of saturated fats. Swapping these foods to replace foods high in saturated fats will help you limit the saturated fat you eat and improve your cholesterol levels. You can also try eating more plant-based or vegetarian meals. Instead of    Try:    Whole milk, cheese, yogurt, and ice cream    1% or skim milk, low-fat cheese, non-fat yogurt, and low-fat ice cream    Fatty, marbled beef and pork    Lean beef, pork, or venison    Poultry with skin    Poultry without skin    Butter, stick margarine    Reduced-fat, whipped, or liquid spreads    Coconut oil, palm oil    Liquid vegetable oils: corn, canola, olive, soybean and safflower oils      Avoid foods that contain trans fats. Trans fats increase levels of LDL-cholesterol. Hydrogenated fat in processed foods is the main source of trans fats in foods. Trans fats can be found in stick margarine, shortening, processed sweets, baked goods, some fried foods, and packaged foods made with hydrogenated oils. Avoid foods with partially hydrogenated oil on the ingredient list such as: cookies, pastries, baked goods, biscuits, crackers, microwave popcorn, and frozen dinners. Choose foods with heart healthy fats. Polyunsaturated and monounsaturated fat are unsaturated fats that may help lower your blood cholesterol level when used in place of saturated fat in your diet. Ask your RDN about taking a dietary supplement with plant sterols and stanols to help lower your cholesterol level. Research shows that substituting saturated fats with unsaturated fats is beneficial to cholesterol levels. Try these easy swaps:        Instead of    Try:    Butter, stick margarine, or solid shortening    Reduced-fat, whipped, or liquid spreads    Beef, pork, or poultry with skin         Fish and seafood    Chips, crackers, snack foods    Raw or unsalted nuts and seeds or nut butters    Hummus with vegetables    Avocado on toast    Coconut oil, palm oil    Liquid vegetable oils: corn, canola, olive, soybean and safflower oils         Limit the amount of cholesterol you eat to less than 200 milligrams per day. Cholesterol is a substance carried through the bloodstream via lipoproteins, which are known as transporters of fat. Some body functions need cholesterol to work properly, but too much cholesterol in the bloodstream can damage arteries and build up blood vessel linings (which can lead to heart attack and stroke). You should eat less than 200 milligrams cholesterol per day. People respond differently to eating cholesterol. There is no test available right now that can figure out which people will respond more to dietary cholesterol and which will respond less. For individuals with high intake of dietary cholesterol, different types of increase (none, small, moderate, large) in LDL-cholesterol levels are all possible. Food sources of cholesterol include egg yolks and organ meats such as liver, gizzards. Limit egg yolks to two to four per week and avoid organ meats like liver and gizzards to control cholesterol intake. Tips for Choosing Heart-Healthy Carbohydrates  Consume a consistent amount of carbohydrate  It is important to eat foods with carbohydrates in moderation because they impact your blood glucose level. Carbohydrates can be found in many foods such as:  Grains (breads, crackers, rice, pasta, and cereals)  Starchy Vegetables (potatoes, corn, and peas)  Beans and legumes  Milk, soy milk, and yogurt  Fruit and fruit juice  Sweets (cakes, cookies, ice cream, jam and jelly)  Your RDN will help you set a goal for how many carbohydrate servings to eat at your meals and snacks. For many adults, eating 3 to 5 servings of carbohydrate foods at each meal and 1 or 2 carbohydrate servings for each snack works well. Check your blood glucose level regularly.  It can tell you if you need to adjust when you eat carbohydrates. Choose foods rich in viscous (soluble) fiber  Viscous, or soluble, is found in the walls of plant cells. Viscous fiber is found only in plant-based foods. Eating foods with fiber helps to lower your unhealthy cholesterol and keep your blood glucose in range  Rich sources of viscous fiber include vegetables (asparagus, [place] sprouts, sweet potatoes, turnips) fruit (apricots, mangoes, oranges), legumes, and whole grains (barley, oats, and oat bran). As you increase your fiber intake gradually, also increase the amount of water you drink. This will help prevent constipation. If you have difficulty achieving this goal, ask your RDN about fiber laxatives. Choose fiber supplements made with viscous fibers such as psyllium seed husks or methylcellulose to help lower unhealthy cholesterol. Limit refined carbohydrates  There are three types of carbohydrates: starches, sugar, and fiber. Some carbohydrates occur naturally in food, like the starches in rice or corn or the sugars in fruits and milk. Refined carbohydrates--foods with high amounts of simple sugars--can raise triglyceride levels. High triglyceride levels are associated with coronary heart disease. Some examples of refined carbohydrate foods are table sugar, sweets, and beverages sweetened with added sugar. Tips for Reducing Sodium (Salt)  Although sodium is important for your body to function, too much sodium can be harmful for people with high blood pressure. As sodium and fluid buildup in your tissues and bloodstream, your blood pressure increases. High blood pressure may cause damage to other organs and increase your risk for a stroke. Even if you take a pill for blood pressure or a water pill (diuretic) to remove fluid, it is still important to have less salt in your diet. Ask your doctor and RDN what amount of sodium is right for you. Avoid processed foods. Eat more fresh foods.   Fresh fruits and vegetables are naturally low in sodium, as well as frozen vegetables and fruits that have no added juices or sauces. Fresh meats are lower in sodium than processed meats, such as hays, sausage, and hotdogs. Read the nutrition label or ask your  to help you find a fresh meat that is low in sodium. Eat less salt--at the table and when cooking. A single teaspoon of table salt has 2,300 mg of sodium. Leave the salt out of recipes for pasta, casseroles, and soups. Ask your RDN how to cook your favorite recipes without sodium  Be a smart . Look for food packages that say salt-free or sodium-free.  These items contain less than 5 milligrams of sodium per serving. Very low-sodium products contain less than 35 milligrams of sodium per serving. Low-sodium products contain less than 140 milligrams of sodium per serving. Beware for Unsalted or No Added Salt products. These items may still be high in sodium. Check the nutrition label. Add flavors to your food without adding sodium. Try lemon juice, lime juice, fruit juice or vinegar. Dry or fresh herbs add flavor. Try basil, bay leaf, dill, rosemary, parsley, [place], dry mustard, nutmeg, thyme, and paprika. Pepper, red pepper flakes, and cayenne pepper can add spice t your meals without adding sodium. Hot sauce contains sodium, but if you use just a drop or two, it will not add up to much. Buy a sodium-free seasoning blend or make your own at home. Additional Lifestyle Tips  Achieve and maintain a healthy weight. Talk with your RDN or your doctor about what is a healthy weight for you. Set goals to reach and maintain that weight. To lose weight, reduce your calorie intake along with increasing your physical activity. A weight loss of 10 to 15 pounds could reduce LDL-cholesterol by 5 milligrams per deciliter. Participate in physical activity. Talk with your health care team to find out what types of physical activity are best for you.  Set a plan to get about 30 minutes of exercise on most days.   Foods Recommended  Food Group    Foods Recommended    Grains    Whole grain breads and cereals, including whole wheat, barley, rye, buckwheat, corn, teff, quinoa, millet, amaranth, brown or wild rice, sorghum, and oats  Pasta, especially whole wheat or other whole grain types  [place], quinoa or wild rice  Whole grain crackers, bread, rolls, pitas  Home-made bread with reduced-sodium baking soda    Protein Foods    Lean cuts of beef and pork (loin, leg, round, extra lean hamburger)  Skinless poultry  Fish  Venison and other wild game  Dried beans and peas  Nuts and nut butters  Meat alternatives made with soy or textured vegetable protein  Egg whites or egg substitute  Cold cuts made with lean meat or soy protein    Dairy    Nonfat (skim), low-fat, or 1%-fat milk  Nonfat or low-fat yogurt or cottage cheese  Fat-free and low-fat cheese    Vegetables    Fresh, frozen, or canned vegetables without added fat or salt    Fruits    Fresh, frozen, canned, or dried fruit    Oils    Unsaturated oils (corn, olive, peanut, soy, sunflower, canola)  Soft or liquid margarines and vegetable oil spreads  Salad dressings  Seeds and nuts  Avocado    Foods Not Recommended  Food Group    Foods Not Recommended    Grains    Breads or crackers topped with salt  Cereals (hot or cold) with more than 300 mg sodium per serving  Biscuits, cornbread, and other quick breads prepared with baking soda  Bread crumbs or stuffing mix from a store  High-fat bakery products, such as doughnuts, biscuits, croissants, Polish pastries, pies, cookies  Instant cooking foods to which you add hot water and stir--potatoes, noodles, rice, etc.  Packaged starchy foods--seasoned noodle or rice dishes, stuffing mix, macaroni and cheese dinner  Snacks made with partially hydrogenated oils, including chips, cheese puffs, snack mixes, regular crackers, butter-flavored popcorn    Protein Foods    Higher-fat cuts of meats (ribs, t-bone steak, regular hamburger)  [place], sausage, or hot dogs  Cold cuts, such as salami or bologna, deli meats, cured meats, corned beef  Organ meats (liver, brains, gizzards, sweetbreads)  Poultry with skin  Fried or smoked meat, poultry, and fish  Whole eggs and egg yolks (more than 2-4 per week)  Salted legumes, nuts, seeds, or nut/seed butters  Meat alternatives with high levels of sodium (>300 mg per serving) or saturated fat (>5 g per serving)    Dairy    Whole milk,?2% fat milk, buttermilk  Whole milk yogurt or ice cream  Cream  Half-&-half  Cream cheese  Sour cream  Cheese    Vegetables    Canned or frozen vegetables with salt, fresh vegetables prepared with salt, butter, cheese, or cream sauce  Fried vegetables  Pickled vegetables such as olives, pickles, or sauerkraut    Fruits    Fried fruits  Fruits served with butter or cream    Oils    Butter, stick margarine, shortening  Partially hydrogenated oils or trans fats  Tropical oils (coconut, palm, palm kernel oils)    Other    Candy, sugar sweetened soft drinks and desserts  Salt, sea salt, garlic salt, and seasoning mixes containing salt  Bouillon cubes  Ketchup, barbecue sauce, Worcestershire sauce, soy sauce, teriyaki sauce  Miso  Salsa  Pickles, olives, relish    Heart Healthy Consistent Carbohydrate Sample 1-Day Menu   Breakfast  1 cup cooked oatmeal (2 carbohydrate servings)  3/4 cup blueberries (1 carbohydrate serving)  1 ounce almonds  1 cup skim milk (1 carbohydrate serving)  1 cup coffee  Morning Snack  1 cup sugar-free nonfat yogurt (1 carbohydrate serving)  Lunch  2 slices whole-wheat bread (2 carbohydrate servings)  2 ounces lean turkey breast  1 ounce low-fat Swiss cheese  1 teaspoon mustard  1 slice tomato  1 lettuce leaf  1 small pear (1 carbohydrate serving)  1 cup skim milk (1 carbohydrate serving)  Afternoon Snack  1 ounce trail mix with unsalted nuts, seeds, and raisins (1 carbohydrate serving)  Evening Meal  3 ounces salmon  2/3 cup cooked brown rice (2 carbohydrate servings)  1 teaspoon soft margarine  1 cup cooked broccoli with 1/2 cup cooked carrots (1 carbohydrate serving  Carrots, cooked, boiled, drained, without salt  1 cup lettuce  1 teaspoon olive oil with vinegar for dressing  1 small whole grain roll (1 carbohydrate serving)  1 teaspoon soft margarine  1 cup unsweetened tea  Evening Snack  1 extra-small banana (1 carbohydrate serving)  Daily Sum  Nutrient Unit Value  Macronutrients  Energy kcal 1938  Energy kJ 8105  Protein g 110  Total lipid (fat) g 70  Carbohydrate, by difference g 232  Fiber, total dietary g 34  Sugars, total g 84  Minerals  Calcium, Ca mg 1359  Iron, Fe mg 12  Sodium, Na mg 1905  Vitamins  Vitamin C, total ascorbic acid mg 123  Vitamin A, IU IU 23098  Vitamin D   Lipids  Fatty acids, total saturated g 13  Fatty acids, total monounsaturated g 30  Fatty acids, total polyunsaturated g 21  Cholesterol mg 113  Heart Healthy Consistent Carbohydrate Vegan Sample 1-Day Menu   Breakfast  1 cup oatmeal, cooked (2 carbohydrate servings)  ¾ cup blueberries (1 carbohydrate serving)  11 almonds, without salt  1 cup soymilk fortified with calcium, vitamin B12, and vitamin D  1 cup coffee  Morning Snack  6 ounces soy yogurt (1½ carbohydrate servings)  Lunch  1 whole wheat bun(1½ carbohydrate servings)  1 black bean burger (1 carbohydrate serving)  2 slices tomatoes  2 leaves lettuce  1 teaspoon mustard  1 small pear (1½ carbohydrate servings)  1 cup green tea, unsweetened  Afternoon Snack  1/3 cup trail mix with nuts, seeds, and raisins, without salt (1½ carbohydrate servinga)  Evening Meal  ½ cup meatless chicken  2/3 cup brown rice, cooked (2 carbohydrate servings)  1 cup broccoli, cooked (2/3 carbohydrate serving)  ½ cup carrots, cooked (1/3 carbohydrate serving)  2 teaspoons olive oil  1 teaspoon balsamic vinegar  1 whole wheat dinner roll (1 carbohydrate serving)  1 teaspoon margarine, soft, tub  1 cup soymilk fortified with calcium, vitamin B12, and vitamin D  Evening Snack  1 extra small banana (1 carbohydrate serving)  1 tablespoon peanut butter  Daily Sum  Nutrient Unit Value  Macronutrients  Energy kcal 1989  Energy kJ 8315  Protein g 85  Total lipid (fat) g 78  Carbohydrate, by difference g 258  Fiber, total dietary g 42  Sugars, total g 68  Minerals  Calcium, Ca mg 1343  Iron, Fe mg 16  Sodium, Na mg 1805  Vitamins  Vitamin C, total ascorbic acid mg 167  Vitamin A, IU IU 84054  Vitamin D   Lipids  Fatty acids, total saturated g 12  Fatty acids, total monounsaturated g 31  Fatty acids, total polyunsaturated g 25  Cholesterol mg 0  Heart Healthy Consistent Carbohydrate Vegetarian (Lacto-Ovo) Sample 1-Day Menu   Breakfast  1 cup oatmeal, cooked (2 carbohydrate servings)  ¾ cup blueberries (1 carbohydrate serving)  11 almonds, without salt  1 cup 1% milk (1 carbohydrate serving)  1 cup coffee  Morning Snack  1 cup fat-free plain yogurt (1 carbohydrate serving)  Lunch  1 whole wheat bun (1½ carbohydrate servings)  1 black bean burger (1 carbohydrate servings)  1 slice cheddar cheese, low sodium  2 slices tomatoes  2 leaves lettuce  1 teaspoon mustard  1 small pear (1½ carbohydrate servings)  1 cup green tea, unsweetened  Afternoon Snack  1/3 cup trail mix with nuts, seeds, and raisins, without salt (1½ carbohydrate servinga)  Evening Meal  ½ cup meatless chicken  2/3 cup brown rice, cooked (2 carbohydrate servings)  1 cup broccoli, cooked (2/3 carbohydrate serving)  ½ cup carrots, cooked (1/3 carbohydrate serving)  2 teaspoons olive oil  1 teaspoon balsamic vinegar  1 whole wheat dinner roll (1 carbohydrate serving)  1 teaspoon margarine, soft, tub  1 cup 1% milk (1 carbohydrate serving)  Evening Snack  1 extra small banana (1 carbohydrate serving)  1 tablespoon peanut butter  Daily Sum  Nutrient Unit Value  Macronutrients  Energy kcal 2093  Energy kJ 8755  Protein g 97  Total lipid (fat) g 81  Carbohydrate, by difference g 269  Fiber, total dietary g 39  Sugars, total g 96  Minerals  Calcium, Ca mg 1571  Iron, Fe mg 15  Sodium, Na mg 1950  Vitamins  Vitamin C, total ascorbic acid mg 139  Vitamin A, IU IU 71954  Vitamin D   Lipids  Fatty acids, total saturated g 20  Fatty acids, total monounsaturated g 33  Fatty acids, total polyunsaturated g 20  Cholesterol mg 57

## 2021-06-19 NOTE — PROGRESS NOTES
Nutrition Education      Provided educational handouts and sample meal plans on heart healthy/diabetic diet. Recommend outpatient nutritional counseling for further education. · Written educational materials provided. · Contact name and number provided. · Refer to Patient Education activity for more details.     Electronically signed by Dia Tineo RD, LD on 6/19/21 at 9:13 AM EDT    Contact: [phone]

## 2021-06-19 NOTE — PLAN OF CARE
Problem: OXYGENATION/RESPIRATORY FUNCTION  Goal: Patient will maintain patent airway  6/19/2021 1939 by Marva Souza RN  Outcome: Met This Shift  6/19/2021 0911 by Palauan Payment, RN  Outcome: Met This Shift     Problem: HEMODYNAMIC STATUS  Goal: Patient has stable vital signs and fluid balance  Outcome: Met This Shift     Problem: Falls - Risk of:  Goal: Will remain free from falls  Description: Will remain free from falls  6/19/2021 1939 by Marva Souza RN  Outcome: Met This Shift  6/19/2021 0911 by Palauan Payment, RN  Outcome: Met This Shift

## 2021-06-19 NOTE — PROGRESS NOTES
Inpatient Cardiology Progress note     PATIENT IS BEING FOLLOWED FOR: Follow-up for new onset HFrEF, aortic stenosis       Jabari Garcia is a 63 y.o. male seen in initial consultation this admission by Dr. Hemant Melchor: Denies SOB --> ambulating in the Hallway. Denies CP. Denies lightheadedness  OBJECTIVE: No apparent distress     ROS:  Consist: Denies fevers, chills or night sweats  Heart: Denies chest pain, palpitations, lightheadedness, dizziness or syncope  Lungs: Denies SOB, cough, wheezing, orthopnea or PND  GI: Denies abdominal pain, vomiting or diarrhea    PHYSICAL EXAM:   BP (!) 148/103   Pulse 82   Temp 95.9 °F (35.5 °C) (Temporal)   Resp 16   Ht 6' 3\" (1.905 m)   Wt 279 lb (126.6 kg)   SpO2 97%   BMI 34.87 kg/m²    B/P Range last 24 hours: Systolic (93PGQ), AEC:218 , Min:133 , PTI:117    Diastolic (22DUM), BWV:38, Min:89, Max:103    CONST: Well developed, well nourished who appears of stated age. Awake, alert and cooperative. No apparent distress  HEENT:   Head- Normocephalic, atraumatic   Eyes- Conjunctivae pink, anicteric  Throat- Oral mucosa pink and moist  Neck-  No stridor, trachea midline, no jugular venous distention. No carotid bruit  CHEST: Chest symmetrical and non-tender to palpation. No accessory muscle use or intercostal retractions  RESPIRATORY:  Lung sounds - clear throughout fields   CARDIOVASCULAR:     Heart Inspection- shows no noted pulsations  Heart Palpation- no heaves or thrills; PMI is non-displaced   Heart Ausculation- Regular rate and rhythm. Normal S1 and S2. 4-8/7 systolic murmur murmur. No s3, s4 or rub   PV: 2+ lower extremity edema. No varicosities. Pedal pulses palpable, no clubbing or cyanosis   ABDOMEN: Soft, non-tender to light palpation. Bowel sounds present. No palpable masses no organomegaly; no abdominal bruit  MS: Good muscle strength and tone. No atrophy or abnormal movements.    : Deferred  SKIN: Warm and dry no statis dermatitis or ulcers   NEURO / PSYCH: Oriented to person, place and time. Speech clear and appropriate. Follows all commands. Flat affect       Intake/Output Summary (Last 24 hours) at 6/19/2021 1609  Last data filed at 6/19/2021 1406  Gross per 24 hour   Intake 1620 ml   Output 4280 ml   Net -2660 ml       Weight:   Wt Readings from Last 3 Encounters:   06/19/21 279 lb (126.6 kg)   06/16/21 296 lb 12.8 oz (134.6 kg)     Current Inpatient Medications:   aspirin  81 mg Oral Daily    lisinopril  20 mg Oral BID    metoprolol succinate  25 mg Oral Daily    sodium chloride flush  5-40 mL Intravenous 2 times per day    enoxaparin  40 mg Subcutaneous Daily    glipiZIDE  5 mg Oral BID AC    furosemide  40 mg Intravenous BID    spironolactone  25 mg Oral Daily       IV Infusions (if any):   DOBUTamine      sodium chloride         DIAGNOSTIC/ LABORATORY DATA:  Labs:   CBC:   Recent Labs     06/17/21  0839   WBC 9.0   HGB 16.0   HCT 50.5        BMP:   Recent Labs     06/18/21  0431 06/19/21  0631    142   K 4.2 4.0   CO2 30* 33*   BUN 19 18   CREATININE 1.3* 1.1   LABGLOM 56 >60   CALCIUM 9.3 9.5     Mag:   Recent Labs     06/18/21  0431 06/19/21  0631   MG 1.8 2.0       HgA1c:   Lab Results   Component Value Date    LABA1C 8.1 (H) 06/15/2021     CARDIAC ENZYMES:  Recent Labs     06/17/21  0839   TROPHS 26*     FASTING LIPID PANEL:  Lab Results   Component Value Date    CHOL 124 06/18/2021    HDL 35 06/18/2021    LDLCALC 75 06/18/2021    TRIG 70 06/18/2021       CXR 6/17/21:   1. Multifocal bilateral airspace disease   2. Possible underlying emphysema and interstitial lung disease. 3. Given that there are no prior studies available for review follow-up CTscan is recommended for further evaluation.     Telemetry: SR    12 lead EKG 6/17/21: SR with PAC's.  LVH with QRS widening / IVCD and repolarization abnormality with poor R wave progression     Echocardiogram 6/15/21:    Summary   Left ventricle is moderately enlarged .   Ejection fraction is visually estimated at 15-20%.   Overall ejection fraction severely decreased .   Severe global hypokinesis.   Normal left ventricle wall thickness.   Stage III diastolic dysfunction.   Moderately dilated right ventricle.   Right ventricle global systolic function is low normal . TAPSE 17 mm.   There is severe low flow, low gradient aortic stenosis with valve area of   0.8 sq cm by continuity equation. Aortic valve peak velocity 2.8 m/s, mean   gradient 19 mmHg, DVI 0.22, SVI 15 ml/m2.   Mild tricuspid regurgitation.  RVSP is 58 mmHg. Pulmonary hypertension is moderate .   No evidence for hemodynamically significant pericardial effusion.       ASSESSMENT:   1. Acute heart failure reduced ejection fraction.  proBNP 5826 --> 3521. Diuresing well and reports resolution of his SOB  2. Cardiomyopathy, EF 15 to 20%.  Valvular, possibly ischemic  3. Low-flow low gradient severe versus pseudosevere aortic stenosis  4. Hypertension  5. Type 2 diabetes, insulin requiring.  A1c 8.1%  6. Obesity BMI 36.4 kg/m²  7. COVID-19 infection November 2020        PLAN:  1. Continue IV diuresis, monitor BMP, BNP, I's and O's and daily weights  2. Increase Toprol XL  3. Rest of cardiac medications same  4. Consider SGLT2 inhibitor( Jardiance 10 mg daily )  5. Cardiac cath Monday 6/21/21  6. Dobutamine stress echo to clarify severe or pseudosevere LFLG aortic stenosis 6/21/21  7. Consider wearable cardioverter defibrillator upon discharge  8.  Will follow    Electronically signed by Phong Zhou MD on 6/19/2021 at 4:09 PM

## 2021-06-19 NOTE — PLAN OF CARE
Problem: OXYGENATION/RESPIRATORY FUNCTION  Goal: Patient will maintain patent airway  6/18/2021 2302 by Felisa Schaeffer RN  Outcome: Met This Shift  6/18/2021 1754 by Ceci Keen RN  Outcome: Met This Shift  6/18/2021 0956 by Ceci Keen RN  Outcome: Met This Shift     Problem: HEMODYNAMIC STATUS  Goal: Patient has stable vital signs and fluid balance  6/18/2021 2302 by Felisa Schaeffer RN  Outcome: Met This Shift  6/18/2021 1754 by Ceci Keen RN  Outcome: Met This Shift  6/18/2021 0956 by Ceci Keen RN  Outcome: Met This Shift     Problem: Falls - Risk of:  Goal: Will remain free from falls  Description: Will remain free from falls  Outcome: Met This Shift

## 2021-06-20 LAB
ANION GAP SERPL CALCULATED.3IONS-SCNC: 11 MMOL/L (ref 7–16)
BUN BLDV-MCNC: 20 MG/DL (ref 6–23)
CALCIUM SERPL-MCNC: 9 MG/DL (ref 8.6–10.2)
CHLORIDE BLD-SCNC: 98 MMOL/L (ref 98–107)
CO2: 26 MMOL/L (ref 22–29)
CREAT SERPL-MCNC: 1 MG/DL (ref 0.7–1.2)
GFR AFRICAN AMERICAN: >60
GFR NON-AFRICAN AMERICAN: >60 ML/MIN/1.73
GLUCOSE BLD-MCNC: 279 MG/DL (ref 74–99)
MAGNESIUM: 1.7 MG/DL (ref 1.6–2.6)
POTASSIUM SERPL-SCNC: 3.9 MMOL/L (ref 3.5–5)
SODIUM BLD-SCNC: 135 MMOL/L (ref 132–146)

## 2021-06-20 PROCEDURE — 6360000002 HC RX W HCPCS: Performed by: INTERNAL MEDICINE

## 2021-06-20 PROCEDURE — 83735 ASSAY OF MAGNESIUM: CPT

## 2021-06-20 PROCEDURE — 6370000000 HC RX 637 (ALT 250 FOR IP): Performed by: INTERNAL MEDICINE

## 2021-06-20 PROCEDURE — 36415 COLL VENOUS BLD VENIPUNCTURE: CPT

## 2021-06-20 PROCEDURE — 80048 BASIC METABOLIC PNL TOTAL CA: CPT

## 2021-06-20 PROCEDURE — 2140000000 HC CCU INTERMEDIATE R&B

## 2021-06-20 PROCEDURE — 6370000000 HC RX 637 (ALT 250 FOR IP): Performed by: FAMILY MEDICINE

## 2021-06-20 PROCEDURE — 6360000002 HC RX W HCPCS: Performed by: FAMILY MEDICINE

## 2021-06-20 PROCEDURE — 99232 SBSQ HOSP IP/OBS MODERATE 35: CPT | Performed by: INTERNAL MEDICINE

## 2021-06-20 PROCEDURE — 2580000003 HC RX 258: Performed by: FAMILY MEDICINE

## 2021-06-20 RX ADMIN — GLIPIZIDE 5 MG: 5 TABLET ORAL at 06:13

## 2021-06-20 RX ADMIN — SPIRONOLACTONE 25 MG: 25 TABLET ORAL at 08:42

## 2021-06-20 RX ADMIN — Medication 10 ML: at 20:59

## 2021-06-20 RX ADMIN — LISINOPRIL 20 MG: 20 TABLET ORAL at 20:59

## 2021-06-20 RX ADMIN — ENOXAPARIN SODIUM 40 MG: 40 INJECTION SUBCUTANEOUS at 08:42

## 2021-06-20 RX ADMIN — METOPROLOL SUCCINATE 50 MG: 50 TABLET, EXTENDED RELEASE ORAL at 08:44

## 2021-06-20 RX ADMIN — Medication 10 ML: at 08:42

## 2021-06-20 RX ADMIN — GLIPIZIDE 5 MG: 5 TABLET ORAL at 17:01

## 2021-06-20 RX ADMIN — LISINOPRIL 20 MG: 20 TABLET ORAL at 08:42

## 2021-06-20 RX ADMIN — FUROSEMIDE 40 MG: 10 INJECTION, SOLUTION INTRAVENOUS at 18:14

## 2021-06-20 RX ADMIN — ASPIRIN 81 MG: 81 TABLET, COATED ORAL at 08:42

## 2021-06-20 RX ADMIN — FUROSEMIDE 40 MG: 10 INJECTION, SOLUTION INTRAVENOUS at 08:42

## 2021-06-20 ASSESSMENT — PAIN SCALES - GENERAL: PAINLEVEL_OUTOF10: 0

## 2021-06-20 NOTE — PATIENT CARE CONFERENCE
P Quality Flow/Interdisciplinary Rounds Progress Note        Quality Flow Rounds held on June 20, 2021    Disciplines Attending:  Bedside Nurse and Nursing Unit Leadership    Kranthi Alvarado was admitted on 6/17/2021  8:18 AM    Anticipated Discharge Date:  Expected Discharge Date: 06/20/21    Disposition:    Benjie Score:  Benjie Scale Score: 23    Readmission Risk              Risk of Unplanned Readmission:  11           Discussed patient goal for the day, patient clinical progression, and barriers to discharge.   The following Goal(s) of the Day/Commitment(s) have been identified:  keep accurate i/o      Alfonso Hutchins RN  June 20, 2021

## 2021-06-20 NOTE — PLAN OF CARE
Problem: OXYGENATION/RESPIRATORY FUNCTION  Goal: Patient will maintain patent airway  Outcome: Met This Shift     Problem: Falls - Risk of:  Goal: Will remain free from falls  Description: Will remain free from falls  Outcome: Met This Shift

## 2021-06-20 NOTE — PROGRESS NOTES
dermatitis or ulcers   NEURO / PSYCH: Oriented to person, place and time. Speech clear and appropriate. Follows all commands. Flat affect       Intake/Output Summary (Last 24 hours) at 6/20/2021 1254  Last data filed at 6/20/2021 0954  Gross per 24 hour   Intake 840 ml   Output 3375 ml   Net -2535 ml       Weight:   Wt Readings from Last 3 Encounters:   06/20/21 274 lb 6.4 oz (124.5 kg)   06/16/21 296 lb 12.8 oz (134.6 kg)     Current Inpatient Medications:   metoprolol succinate  50 mg Oral Daily    aspirin  81 mg Oral Daily    lisinopril  20 mg Oral BID    sodium chloride flush  5-40 mL Intravenous 2 times per day    enoxaparin  40 mg Subcutaneous Daily    glipiZIDE  5 mg Oral BID AC    furosemide  40 mg Intravenous BID    spironolactone  25 mg Oral Daily       IV Infusions (if any):   DOBUTamine      sodium chloride         DIAGNOSTIC/ LABORATORY DATA:  Labs:   CBC:   No results for input(s): WBC, HGB, HCT, PLT in the last 72 hours. BMP:   Recent Labs     06/19/21  0631 06/20/21  0530    135   K 4.0 3.9   CO2 33* 26   BUN 18 20   CREATININE 1.1 1.0   LABGLOM >60 >60   CALCIUM 9.5 9.0     Mag:   Recent Labs     06/19/21  0631 06/20/21  0530   MG 2.0 1.7       HgA1c:   Lab Results   Component Value Date    LABA1C 8.1 (H) 06/15/2021     CARDIAC ENZYMES:  No results for input(s): CKTOTAL, CKMB, CKMBINDEX, TROPHS in the last 72 hours. FASTING LIPID PANEL:  Lab Results   Component Value Date    CHOL 124 06/18/2021    HDL 35 06/18/2021    LDLCALC 75 06/18/2021    TRIG 70 06/18/2021       CXR 6/17/21:   1. Multifocal bilateral airspace disease   2. Possible underlying emphysema and interstitial lung disease. 3. Given that there are no prior studies available for review follow-up CTscan is recommended for further evaluation.     Telemetry: SR    12 lead EKG 6/17/21: SR with PAC's.  LVH with QRS widening / IVCD and repolarization abnormality with poor R wave progression     Echocardiogram 6/15/21:

## 2021-06-21 ENCOUNTER — APPOINTMENT (OUTPATIENT)
Dept: CARDIAC CATH/INVASIVE PROCEDURES | Age: 61
DRG: 192 | End: 2021-06-21
Payer: COMMERCIAL

## 2021-06-21 LAB
ABO/RH: NORMAL
ANION GAP SERPL CALCULATED.3IONS-SCNC: 13 MMOL/L (ref 7–16)
ANTIBODY SCREEN: NORMAL
BASOPHILS ABSOLUTE: 0.07 E9/L (ref 0–0.2)
BASOPHILS RELATIVE PERCENT: 0.8 % (ref 0–2)
BUN BLDV-MCNC: 20 MG/DL (ref 6–23)
CALCIUM SERPL-MCNC: 9.6 MG/DL (ref 8.6–10.2)
CHLORIDE BLD-SCNC: 97 MMOL/L (ref 98–107)
CO2: 29 MMOL/L (ref 22–29)
CREAT SERPL-MCNC: 0.9 MG/DL (ref 0.7–1.2)
EOSINOPHILS ABSOLUTE: 0.27 E9/L (ref 0.05–0.5)
EOSINOPHILS RELATIVE PERCENT: 3.2 % (ref 0–6)
GFR AFRICAN AMERICAN: >60
GFR NON-AFRICAN AMERICAN: >60 ML/MIN/1.73
GLUCOSE BLD-MCNC: 309 MG/DL (ref 74–99)
HCT VFR BLD CALC: 53.4 % (ref 37–54)
HEMOGLOBIN: 16.9 G/DL (ref 12.5–16.5)
IMMATURE GRANULOCYTES #: 0.05 E9/L
IMMATURE GRANULOCYTES %: 0.6 % (ref 0–5)
LYMPHOCYTES ABSOLUTE: 1.46 E9/L (ref 1.5–4)
LYMPHOCYTES RELATIVE PERCENT: 17.5 % (ref 20–42)
MAGNESIUM: 1.9 MG/DL (ref 1.6–2.6)
MCH RBC QN AUTO: 30.5 PG (ref 26–35)
MCHC RBC AUTO-ENTMCNC: 31.6 % (ref 32–34.5)
MCV RBC AUTO: 96.4 FL (ref 80–99.9)
METER GLUCOSE: 253 MG/DL (ref 74–99)
METER GLUCOSE: 286 MG/DL (ref 74–99)
METER GLUCOSE: 338 MG/DL (ref 74–99)
MONOCYTES ABSOLUTE: 0.65 E9/L (ref 0.1–0.95)
MONOCYTES RELATIVE PERCENT: 7.8 % (ref 2–12)
NEUTROPHILS ABSOLUTE: 5.84 E9/L (ref 1.8–7.3)
NEUTROPHILS RELATIVE PERCENT: 70.1 % (ref 43–80)
PDW BLD-RTO: 13.8 FL (ref 11.5–15)
PLATELET # BLD: 210 E9/L (ref 130–450)
PMV BLD AUTO: 11.4 FL (ref 7–12)
POTASSIUM SERPL-SCNC: 4.2 MMOL/L (ref 3.5–5)
PRO-BNP: 2347 PG/ML (ref 0–125)
RBC # BLD: 5.54 E12/L (ref 3.8–5.8)
SODIUM BLD-SCNC: 139 MMOL/L (ref 132–146)
WBC # BLD: 8.3 E9/L (ref 4.5–11.5)

## 2021-06-21 PROCEDURE — 85025 COMPLETE CBC W/AUTO DIFF WBC: CPT

## 2021-06-21 PROCEDURE — 6360000002 HC RX W HCPCS: Performed by: INTERNAL MEDICINE

## 2021-06-21 PROCEDURE — 4A023N7 MEASUREMENT OF CARDIAC SAMPLING AND PRESSURE, LEFT HEART, PERCUTANEOUS APPROACH: ICD-10-PCS | Performed by: INTERNAL MEDICINE

## 2021-06-21 PROCEDURE — 6370000000 HC RX 637 (ALT 250 FOR IP): Performed by: FAMILY MEDICINE

## 2021-06-21 PROCEDURE — 6370000000 HC RX 637 (ALT 250 FOR IP): Performed by: INTERNAL MEDICINE

## 2021-06-21 PROCEDURE — 36415 COLL VENOUS BLD VENIPUNCTURE: CPT

## 2021-06-21 PROCEDURE — 2500000003 HC RX 250 WO HCPCS

## 2021-06-21 PROCEDURE — 86850 RBC ANTIBODY SCREEN: CPT

## 2021-06-21 PROCEDURE — 86901 BLOOD TYPING SEROLOGIC RH(D): CPT

## 2021-06-21 PROCEDURE — C1894 INTRO/SHEATH, NON-LASER: HCPCS

## 2021-06-21 PROCEDURE — C1769 GUIDE WIRE: HCPCS

## 2021-06-21 PROCEDURE — 93017 CV STRESS TEST TRACING ONLY: CPT

## 2021-06-21 PROCEDURE — 2580000003 HC RX 258: Performed by: INTERNAL MEDICINE

## 2021-06-21 PROCEDURE — B2111ZZ FLUOROSCOPY OF MULTIPLE CORONARY ARTERIES USING LOW OSMOLAR CONTRAST: ICD-10-PCS | Performed by: INTERNAL MEDICINE

## 2021-06-21 PROCEDURE — 80048 BASIC METABOLIC PNL TOTAL CA: CPT

## 2021-06-21 PROCEDURE — 83735 ASSAY OF MAGNESIUM: CPT

## 2021-06-21 PROCEDURE — 82962 GLUCOSE BLOOD TEST: CPT

## 2021-06-21 PROCEDURE — 2580000003 HC RX 258: Performed by: FAMILY MEDICINE

## 2021-06-21 PROCEDURE — 93454 CORONARY ARTERY ANGIO S&I: CPT | Performed by: INTERNAL MEDICINE

## 2021-06-21 PROCEDURE — 99024 POSTOP FOLLOW-UP VISIT: CPT | Performed by: INTERNAL MEDICINE

## 2021-06-21 PROCEDURE — 93350 STRESS TTE ONLY: CPT

## 2021-06-21 PROCEDURE — 2140000000 HC CCU INTERMEDIATE R&B

## 2021-06-21 PROCEDURE — 83880 ASSAY OF NATRIURETIC PEPTIDE: CPT

## 2021-06-21 PROCEDURE — 6360000002 HC RX W HCPCS

## 2021-06-21 PROCEDURE — 86900 BLOOD TYPING SEROLOGIC ABO: CPT

## 2021-06-21 PROCEDURE — 2709999900 HC NON-CHARGEABLE SUPPLY

## 2021-06-21 RX ORDER — FUROSEMIDE 10 MG/ML
40 INJECTION INTRAMUSCULAR; INTRAVENOUS 2 TIMES DAILY
Status: DISCONTINUED | OUTPATIENT
Start: 2021-06-22 | End: 2021-06-23

## 2021-06-21 RX ORDER — DEXTROSE MONOHYDRATE 25 G/50ML
12.5 INJECTION, SOLUTION INTRAVENOUS PRN
Status: DISCONTINUED | OUTPATIENT
Start: 2021-06-21 | End: 2021-06-23 | Stop reason: HOSPADM

## 2021-06-21 RX ORDER — DEXTROSE MONOHYDRATE 50 MG/ML
100 INJECTION, SOLUTION INTRAVENOUS PRN
Status: DISCONTINUED | OUTPATIENT
Start: 2021-06-21 | End: 2021-06-23 | Stop reason: HOSPADM

## 2021-06-21 RX ORDER — NICOTINE POLACRILEX 4 MG
15 LOZENGE BUCCAL PRN
Status: DISCONTINUED | OUTPATIENT
Start: 2021-06-21 | End: 2021-06-23 | Stop reason: HOSPADM

## 2021-06-21 RX ORDER — ATORVASTATIN CALCIUM 40 MG/1
40 TABLET, FILM COATED ORAL NIGHTLY
Status: DISCONTINUED | OUTPATIENT
Start: 2021-06-21 | End: 2021-06-23 | Stop reason: HOSPADM

## 2021-06-21 RX ADMIN — Medication 10 ML: at 21:31

## 2021-06-21 RX ADMIN — LISINOPRIL 20 MG: 20 TABLET ORAL at 21:30

## 2021-06-21 RX ADMIN — GLIPIZIDE 5 MG: 5 TABLET ORAL at 15:46

## 2021-06-21 RX ADMIN — ASPIRIN 81 MG: 81 TABLET, COATED ORAL at 08:52

## 2021-06-21 RX ADMIN — ATORVASTATIN CALCIUM 40 MG: 40 TABLET, FILM COATED ORAL at 21:30

## 2021-06-21 RX ADMIN — Medication 10 ML: at 08:52

## 2021-06-21 RX ADMIN — FUROSEMIDE 40 MG: 10 INJECTION, SOLUTION INTRAVENOUS at 08:52

## 2021-06-21 RX ADMIN — SPIRONOLACTONE 25 MG: 25 TABLET ORAL at 08:52

## 2021-06-21 RX ADMIN — INSULIN LISPRO 3 UNITS: 100 INJECTION, SOLUTION INTRAVENOUS; SUBCUTANEOUS at 16:06

## 2021-06-21 RX ADMIN — DOBUTAMINE 10 MCG/KG/MIN: 12.5 INJECTION, SOLUTION, CONCENTRATE INTRAVENOUS at 10:08

## 2021-06-21 RX ADMIN — LISINOPRIL 20 MG: 20 TABLET ORAL at 08:52

## 2021-06-21 RX ADMIN — INSULIN LISPRO 2 UNITS: 100 INJECTION, SOLUTION INTRAVENOUS; SUBCUTANEOUS at 21:55

## 2021-06-21 ASSESSMENT — PAIN SCALES - GENERAL
PAINLEVEL_OUTOF10: 0

## 2021-06-21 NOTE — PATIENT CARE CONFERENCE
[facility] Quality Flow/Interdisciplinary Rounds Progress Note        Quality Flow Rounds held on June 21, 2021    Disciplines Attending:  Bedside Nurse, ,  and Nursing Unit Leadership    Floridalma Vargas was admitted on 6/17/2021  8:18 AM    Anticipated Discharge Date:  Expected Discharge Date: 06/20/21    Disposition:    Benjie Score:  Benjie Scale Score: 22    Readmission Risk              Risk of Unplanned Readmission:  11           Discussed patient goal for the day, patient clinical progression, and barriers to discharge.   The following Goal(s) of the Day/Commitment(s) have been identified:  Diagnostics - Report Results      Petra Wylie RN  June 21, 2021

## 2021-06-21 NOTE — PROGRESS NOTES
abnormal movements. : Deferred  SKIN: Warm and dry no statis dermatitis or ulcers   NEURO / PSYCH: Oriented to person, place and time. Speech clear and appropriate. Follows all commands. Flat affect       Intake/Output Summary (Last 24 hours) at 6/21/2021 1307  Last data filed at 6/21/2021 1039  Gross per 24 hour   Intake 240 ml   Output 3775 ml   Net -3535 ml       Weight:   Wt Readings from Last 3 Encounters:   06/21/21 268 lb 12.8 oz (121.9 kg)   06/16/21 296 lb 12.8 oz (134.6 kg)     Current Inpatient Medications:   insulin lispro  0-6 Units Subcutaneous TID WC    insulin lispro  0-3 Units Subcutaneous Nightly    metoprolol succinate  50 mg Oral Daily    aspirin  81 mg Oral Daily    lisinopril  20 mg Oral BID    sodium chloride flush  5-40 mL Intravenous 2 times per day    enoxaparin  40 mg Subcutaneous Daily    glipiZIDE  5 mg Oral BID AC    furosemide  40 mg Intravenous BID    spironolactone  25 mg Oral Daily       IV Infusions (if any):   dextrose      DOBUTamine 10 mcg/kg/min (06/21/21 1008)    sodium chloride         DIAGNOSTIC/ LABORATORY DATA:  Labs:   CBC:   Recent Labs     06/21/21  0703   WBC 8.3   HGB 16.9*   HCT 53.4        BMP:   Recent Labs     06/20/21  0530 06/21/21  0703    139   K 3.9 4.2   CO2 26 29   BUN 20 20   CREATININE 1.0 0.9   LABGLOM >60 >60   CALCIUM 9.0 9.6     Mag:   Recent Labs     06/20/21  0530 06/21/21  0703   MG 1.7 1.9       HgA1c:   Lab Results   Component Value Date    LABA1C 8.1 (H) 06/15/2021     CARDIAC ENZYMES:  No results for input(s): CKTOTAL, CKMB, CKMBINDEX, TROPHS in the last 72 hours. FASTING LIPID PANEL:  Lab Results   Component Value Date    CHOL 124 06/18/2021    HDL 35 06/18/2021    LDLCALC 75 06/18/2021    TRIG 70 06/18/2021       CXR 6/17/21:   1. Multifocal bilateral airspace disease   2. Possible underlying emphysema and interstitial lung disease.   3. Given that there are no prior studies available for review follow-up CTscan is recommended for further evaluation.     Telemetry: SR    12 lead EKG 6/17/21: SR with PAC's. LVH with QRS widening / IVCD and repolarization abnormality with poor R wave progression     Echocardiogram 6/15/21:    Summary   Left ventricle is moderately enlarged .   Ejection fraction is visually estimated at 15-20%.   Overall ejection fraction severely decreased .   Severe global hypokinesis.   Normal left ventricle wall thickness.   Stage III diastolic dysfunction.   Moderately dilated right ventricle.   Right ventricle global systolic function is low normal . TAPSE 17 mm.   There is severe low flow, low gradient aortic stenosis with valve area of   0.8 sq cm by continuity equation. Aortic valve peak velocity 2.8 m/s, mean   gradient 19 mmHg, DVI 0.22, SVI 15 ml/m2.   Mild tricuspid regurgitation.  RVSP is 58 mmHg. Pulmonary hypertension is moderate .   No evidence for hemodynamically significant pericardial effusion.       ASSESSMENT:   1. Acute heart failure reduced ejection fraction.  proBNP 5826 --> 3521  --> 2347. Diuresing well and reports resolution of his SOB  2. Cardiomyopathy, EF 15 to 20%.      3. True AS suggested by decrease of YESI from 0.7 cm2 to 0.6 cm2. Additionally the dimensionless index remained less than 0.25 throughout. Contractile reserve was demonstrated with a 50% increase in cardiac output with administration of dobutamine. 4. Hypertension  5. Type 2 diabetes, insulin requiring.  A1c 8.1%  6. Obesity BMI 36.4 kg/m²  7. COVID-19 infection November 2020        PLAN:  1. Coronary angiography today  2.  CT surgery consult post procedure    Electronically signed by Diego Kaiser MD on 6/21/2021 at 1:07 PM

## 2021-06-21 NOTE — OP NOTE
Operative Note      Patient: Nyla Servin  YOB: 1960  MRN: [id number]    Date of Procedure: 6/21/21    Indication:  1. Aortic stenosis. CHF  2. AUC score: 7  3. AUC indication: 70    Procedure: Coronary angiography    Anesthesia: Versed, Fentanyl  Time sedation was administered: 13:58. I was present in the room when sedation was administered. Procedure end time: 14:15  Time spent with face to face monitoring of moderate sedation: 32 minutes    LHC performed via right radial approach using a 6 F sheath. 2.5mg of diluted Verapamil and 200mcg of nitroglycerine administered through the sheath. 5000 U heparin administered IV. Findings:  Left main: 0%  stenosis  LAD: 80-90 %  Stenosis  IR: large with 95% stenosis  Circumflex: 70 %  Stenosis of small to moderate size OM  RCA: Dominant. 30-40 %  stenosis  LV angio: not performed    Hemodynamics: Ao: 127/95 ( 108 ). Sheath removed and TR band applied. There was good hemostasis achieved and the distal pulses were intact.      Complication: None   Estimated blood loss: 20  Contrast use: 75 cc    Post op diagnosis:  Severe LAD and IR disease  Severe AS    PLAN:  CT surgery consult      Electronically signed by Alexandro Agosto MD on 6/21/2021 at 2:25 PM

## 2021-06-21 NOTE — PROCEDURES
[address]                  [address] [facility],  [facility]                            CARDIAC CATHETERIZATION    PATIENT NAME: Gaurav Burns                :        1960  MED REC NO:   [id number]                            ROOM:       6510  ACCOUNT NO:   [de-identified]                           ADMIT DATE: 2021  PROVIDER:     Campos Pro MD    DATE OF PROCEDURE:  2021    PROCEDURE:  Selective coronary angiography. ROUTE:  The procedure was done through right radial approach using  ultrasound guidance. SEDATION:  The patient received intravenous Versed and intravenous  fentanyl for sedation. INDICATIONS:  Cardiomyopathy. Congestive heart failure. Severe aortic  stenosis. PRESSURES:  Aorta 127/95 with a mean of 108. The aortic valve appeared calcified on fluoroscopy. CORONARY ANGIOGRAPHY:  Left main:  The left main artery did not appear to have any significant  disease. LAD:  The left anterior descending artery had an eccentric 90% stenosis  shortly after its origin before the first septal  branch. There is 70% to 80% mid LAD disease after a moderate size diagonal  branch and there appeared to be myocardial bridging at that site. Intermediate ramus: This is a relatively large vessel which had 95%  subtotal proximal vessel occlusion. LCX:  The left circumflex is a large but nondominant vessel which had  luminal irregularities along its course without any significant  angiographic luminal narrowing. It is a small to moderate size marginal  branch, has around 70% to 80% ostial narrowing. RCA:  The right coronary artery is a dominant vessel. It has around 30%  to 40% mid vessel disease and around 30% distal vessel disease. The  posterolateral branch is a small vessel. The posterior descending  artery branch is a large vessel with around 40% disease in its middle  segment.     The right radial arterial sheath was removed at the end of the procedure  and a TR band was applied with adequate hemostasis and with preservation  of pulse. The patient tolerated the procedure well and left the cardiac  catheterization laboratory in stable condition. CONCLUSIONS:  1. Coronary artery disease:  A. Left main. No significant disease. B.  LAD. Eccentric 80% to 90% very proximal vessel narrowing and 70% to  80% mid vessel disease after a moderate size diagonal branch with  myocardial bridging at the site. C. Intermediate ramus. Relatively large vessel with 95% proximal  vessel subtotal occlusion. D.  LCX. Large but nondominant vessel with mild luminal irregularities  and with 70% to 80% ostial narrowing of a small to moderate size  marginal branch. E.  RCA. Dominant vessel with around 30% to 40% proximal and mid and  distal vessel disease, and around 40% disease of the large posterior  descending artery branch.         Lamonte Parnell MD    D: 06/21/2021 14:40:20       T: 06/21/2021 14:44:15     [email or web address]  Job#: [id number]     Doc#: [id number]    CC:

## 2021-06-21 NOTE — PROCEDURES
Prelim  Stress Echo for assessment of LFLG AS    True AS suggested by decrease of YESI from 0.7 cm2 to 0.6 cm2. Additionally the dimensionless index remained less than 0.25 throughout. Contractile reserve was demonstrated with a 50% increase in cardiac output with administration of dobutamine. For full results please refer to formal report. Judy Tamayo D.O.   Cardiologist  Cardiology, [facility]

## 2021-06-21 NOTE — PROGRESS NOTES
Subjective: The patient is awake and alert. Resting comfortably no apparent acute distress  Has been out and ambulating in the halls without any issues  [facility] today       Objective:    /88   Pulse 80   Temp 96.7 °F (35.9 °C) (Temporal)   Resp 18   Ht 6' 3\" (1.905 m)   Wt 268 lb 12.8 oz (121.9 kg)   SpO2 97%   BMI 33.60 kg/m²     In: 120 [P.O.:120]  Out: 3425   In: 120   Out: 3425 [Urine:3425]    General appearance: NAD, conversant,   HEENT: AT/NC, MMM  Neck: FROM, supple  Lungs: Clear to auscultation  CV: RRR, no MRGs  Vasc: Radial pulses 2+  Abdomen: Soft, non-tender; no masses or HSM  Extremities: No peripheral edema or digital cyanosis  Skin: no rash, lesions or ulcers  Psych: Alert and oriented to person, place and time, very sad  Neuro: Alert and interactive     Recent Labs     06/21/21  0703   WBC 8.3   HGB 16.9*   HCT 53.4          Recent Labs     06/19/21  0631 06/20/21  0530 06/21/21  0703    135 139   K 4.0 3.9 4.2   CL 97* 98 97*   CO2 33* 26 29   BUN 18 20 20   CREATININE 1.1 1.0 0.9   CALCIUM 9.5 9.0 9.6       Assessment:    Active Problems:    Heart failure (HCC)  Resolved Problems:    * No resolved hospital problems.  *      Plan:  Admit to tele for evaluation of new  CMP   [facility] to assess for ischemic etiology-scheduled for today   Await results   life vest , GDMT to include Entresto (renal function permitting), beta-blocker, diuretic, Aldactone   On dobutamine drip  Blood pressure better today with initiation of lisinopril diuretic beta-blocker  Monitor labs and vitals   Stop drinking energy drinks   a1c and lipid panel-normal -A1c 8.1 -blood sugars are uncontrolled-may need to be initiated on low-dose glargine secondary to persistent elevation of blood sugars  Increase glipizide to 10 p.o. twice daily and continue insulin sliding (will not add Metformin at this point secondary to anticipated left heart cath on Monday)   May benefit from antidepressant -         Maia Lori Warren MD  10:04 AM  6/21/2021

## 2021-06-22 ENCOUNTER — APPOINTMENT (OUTPATIENT)
Dept: ULTRASOUND IMAGING | Age: 61
DRG: 192 | End: 2021-06-22
Payer: COMMERCIAL

## 2021-06-22 ENCOUNTER — APPOINTMENT (OUTPATIENT)
Dept: CT IMAGING | Age: 61
DRG: 192 | End: 2021-06-22
Payer: COMMERCIAL

## 2021-06-22 ENCOUNTER — APPOINTMENT (OUTPATIENT)
Dept: INTERVENTIONAL RADIOLOGY/VASCULAR | Age: 61
DRG: 192 | End: 2021-06-22
Payer: COMMERCIAL

## 2021-06-22 ENCOUNTER — TELEPHONE (OUTPATIENT)
Dept: CARDIOLOGY CLINIC | Age: 61
End: 2021-06-22

## 2021-06-22 DIAGNOSIS — I50.22 CHRONIC SYSTOLIC CONGESTIVE HEART FAILURE (HCC): Primary | ICD-10-CM

## 2021-06-22 LAB
ANION GAP SERPL CALCULATED.3IONS-SCNC: 11 MMOL/L (ref 7–16)
BACTERIA: ABNORMAL /HPF
BILIRUBIN URINE: NEGATIVE
BLOOD, URINE: NEGATIVE
BUN BLDV-MCNC: 16 MG/DL (ref 6–23)
CALCIUM SERPL-MCNC: 9.5 MG/DL (ref 8.6–10.2)
CHLORIDE BLD-SCNC: 96 MMOL/L (ref 98–107)
CHOLESTEROL, FASTING: 142 MG/DL (ref 0–199)
CLARITY: CLEAR
CO2: 27 MMOL/L (ref 22–29)
COLOR: YELLOW
CREAT SERPL-MCNC: 0.9 MG/DL (ref 0.7–1.2)
EPITHELIAL CELLS, UA: ABNORMAL /HPF
GFR AFRICAN AMERICAN: >60
GFR NON-AFRICAN AMERICAN: >60 ML/MIN/1.73
GLUCOSE BLD-MCNC: 283 MG/DL (ref 74–99)
GLUCOSE URINE: 250 MG/DL
HDLC SERPL-MCNC: 33 MG/DL
KETONES, URINE: NEGATIVE MG/DL
LDL CHOLESTEROL CALCULATED: 87 MG/DL (ref 0–99)
LEUKOCYTE ESTERASE, URINE: ABNORMAL
MAGNESIUM: 1.8 MG/DL (ref 1.6–2.6)
METER GLUCOSE: 259 MG/DL (ref 74–99)
METER GLUCOSE: 267 MG/DL (ref 74–99)
METER GLUCOSE: 279 MG/DL (ref 74–99)
METER GLUCOSE: 303 MG/DL (ref 74–99)
NITRITE, URINE: NEGATIVE
PH UA: 6 (ref 5–9)
POTASSIUM SERPL-SCNC: 3.9 MMOL/L (ref 3.5–5)
PROTEIN UA: NEGATIVE MG/DL
RBC UA: ABNORMAL /HPF (ref 0–2)
SODIUM BLD-SCNC: 134 MMOL/L (ref 132–146)
SPECIFIC GRAVITY UA: 1.01 (ref 1–1.03)
TRIGLYCERIDE, FASTING: 108 MG/DL (ref 0–149)
UROBILINOGEN, URINE: 0.2 E.U./DL
VLDLC SERPL CALC-MCNC: 22 MG/DL
WBC UA: ABNORMAL /HPF (ref 0–5)

## 2021-06-22 PROCEDURE — 94375 RESPIRATORY FLOW VOLUME LOOP: CPT

## 2021-06-22 PROCEDURE — 87081 CULTURE SCREEN ONLY: CPT

## 2021-06-22 PROCEDURE — 82962 GLUCOSE BLOOD TEST: CPT

## 2021-06-22 PROCEDURE — 93880 EXTRACRANIAL BILAT STUDY: CPT

## 2021-06-22 PROCEDURE — 83735 ASSAY OF MAGNESIUM: CPT

## 2021-06-22 PROCEDURE — 6370000000 HC RX 637 (ALT 250 FOR IP): Performed by: INTERNAL MEDICINE

## 2021-06-22 PROCEDURE — 93922 UPR/L XTREMITY ART 2 LEVELS: CPT

## 2021-06-22 PROCEDURE — 99254 IP/OBS CNSLTJ NEW/EST MOD 60: CPT | Performed by: THORACIC SURGERY (CARDIOTHORACIC VASCULAR SURGERY)

## 2021-06-22 PROCEDURE — 71250 CT THORAX DX C-: CPT

## 2021-06-22 PROCEDURE — 36415 COLL VENOUS BLD VENIPUNCTURE: CPT

## 2021-06-22 PROCEDURE — 6360000002 HC RX W HCPCS: Performed by: INTERNAL MEDICINE

## 2021-06-22 PROCEDURE — 99233 SBSQ HOSP IP/OBS HIGH 50: CPT | Performed by: INTERNAL MEDICINE

## 2021-06-22 PROCEDURE — 2580000003 HC RX 258: Performed by: INTERNAL MEDICINE

## 2021-06-22 PROCEDURE — 94729 DIFFUSING CAPACITY: CPT

## 2021-06-22 PROCEDURE — 80048 BASIC METABOLIC PNL TOTAL CA: CPT

## 2021-06-22 PROCEDURE — 87088 URINE BACTERIA CULTURE: CPT

## 2021-06-22 PROCEDURE — 81001 URINALYSIS AUTO W/SCOPE: CPT

## 2021-06-22 PROCEDURE — 80061 LIPID PANEL: CPT

## 2021-06-22 PROCEDURE — 93880 EXTRACRANIAL BILAT STUDY: CPT | Performed by: RADIOLOGY

## 2021-06-22 PROCEDURE — 2140000000 HC CCU INTERMEDIATE R&B

## 2021-06-22 RX ADMIN — LISINOPRIL 20 MG: 20 TABLET ORAL at 20:36

## 2021-06-22 RX ADMIN — FUROSEMIDE 40 MG: 10 INJECTION, SOLUTION INTRAVENOUS at 16:49

## 2021-06-22 RX ADMIN — METOPROLOL SUCCINATE 50 MG: 50 TABLET, EXTENDED RELEASE ORAL at 07:54

## 2021-06-22 RX ADMIN — GLIPIZIDE 5 MG: 5 TABLET ORAL at 06:28

## 2021-06-22 RX ADMIN — ENOXAPARIN SODIUM 40 MG: 40 INJECTION SUBCUTANEOUS at 07:54

## 2021-06-22 RX ADMIN — INSULIN LISPRO 3 UNITS: 100 INJECTION, SOLUTION INTRAVENOUS; SUBCUTANEOUS at 07:55

## 2021-06-22 RX ADMIN — INSULIN LISPRO 6 UNITS: 100 INJECTION, SOLUTION INTRAVENOUS; SUBCUTANEOUS at 16:52

## 2021-06-22 RX ADMIN — INSULIN LISPRO 4 UNITS: 100 INJECTION, SOLUTION INTRAVENOUS; SUBCUTANEOUS at 11:05

## 2021-06-22 RX ADMIN — INSULIN LISPRO 3 UNITS: 100 INJECTION, SOLUTION INTRAVENOUS; SUBCUTANEOUS at 20:36

## 2021-06-22 RX ADMIN — Medication 10 ML: at 08:04

## 2021-06-22 RX ADMIN — ASPIRIN 81 MG: 81 TABLET, COATED ORAL at 07:53

## 2021-06-22 RX ADMIN — Medication 10 ML: at 20:36

## 2021-06-22 RX ADMIN — LISINOPRIL 20 MG: 20 TABLET ORAL at 07:53

## 2021-06-22 RX ADMIN — FUROSEMIDE 40 MG: 10 INJECTION, SOLUTION INTRAVENOUS at 07:54

## 2021-06-22 RX ADMIN — GLIPIZIDE 5 MG: 5 TABLET ORAL at 16:49

## 2021-06-22 RX ADMIN — SPIRONOLACTONE 25 MG: 25 TABLET ORAL at 07:54

## 2021-06-22 RX ADMIN — ATORVASTATIN CALCIUM 40 MG: 40 TABLET, FILM COATED ORAL at 20:36

## 2021-06-22 ASSESSMENT — PAIN SCALES - GENERAL
PAINLEVEL_OUTOF10: 0

## 2021-06-22 NOTE — PROGRESS NOTES
Subjective: The patient is awake and alert. Had left heart cath yesterday  Nervous about upcoming intervention/surgery      Objective:    BP (!) 125/90   Pulse 84   Temp 96.6 °F (35.9 °C) (Temporal)   Resp 16   Ht 6' 3\" (1.905 m)   Wt 264 lb 9.6 oz (120 kg)   SpO2 93%   BMI 33.07 kg/m²     In: 420 [P.O.:420]  Out: 2150   In: 420   Out: 2150 [Urine:2150]    General appearance: NAD, conversant,   HEENT: AT/NC, MMM  Neck: FROM, supple  Lungs: Clear to auscultation  CV: RRR, no MRGs  Vasc: Radial pulses 2+  Abdomen: Soft, non-tender; no masses or HSM  Extremities: No peripheral edema or digital cyanosis  Skin: no rash, lesions or ulcers  Psych: Alert and oriented to person, place and time, very sad  Neuro: Alert and interactive     Recent Labs     06/21/21  0703   WBC 8.3   HGB 16.9*   HCT 53.4          Recent Labs     06/20/21  0530 06/21/21  0703 06/22/21  0513    139 134   K 3.9 4.2 3.9   CL 98 97* 96*   CO2 26 29 27   BUN 20 20 16   CREATININE 1.0 0.9 0.9   CALCIUM 9.0 9.6 9.5       Assessment:    Active Problems:    Heart failure (HCC)  Resolved Problems:    * No resolved hospital problems.  *      Plan:  Admit to tele for evaluation of new  [facility] to assess for ischemic etiology-multivessel coronary artery disease with severe aortic stenosis  Await CABG/TAVR  life vest , GDMT to include Entresto (renal function permitting), beta-blocker, diuretic, Aldactone   On dobutamine drip  Blood pressure better today with initiation of lisinopril diuretic beta-blocker  Stop drinking energy drinks   a1c and lipid panel-normal -A1c 8.1 -blood sugars are uncontrolled-may need to be initiated on low-dose glargine secondary to persistent elevation of blood sugars-t insulin sliding scale medium dose  Increase glipizide to 10 p.o. twice daily and continue insulin sliding (will not add Metformin at this point secondary to anticipated left heart cath on Monday)   May benefit from antidepressant -

## 2021-06-22 NOTE — PROGRESS NOTES
Inpatient Cardiology Progress note     PATIENT IS BEING FOLLOWED FOR: Follow-up for new onset HFrEF, aortic stenosis       Nyla Servin is a 63 y.o. male seen in initial consultation this admission by Dr. Marie Benitezts: Denies SOB. Denies CP. Denies lightheadedness. OBJECTIVE: No apparent distress. Continues to diurese well. Had Dobutamine stress Echo earlier this am ( see below )    ROS:  Consist: Denies fevers, chills or night sweats  Heart: Denies chest pain, palpitations, lightheadedness, dizziness or syncope  Lungs: Denies SOB, cough, wheezing, orthopnea or PND  GI: Denies abdominal pain, vomiting or diarrhea    PHYSICAL EXAM:   /85   Pulse 81   Temp 97.1 °F (36.2 °C) (Temporal)   Resp 16   Ht 6' 3\" (1.905 m)   Wt 264 lb 9.6 oz (120 kg)   SpO2 98%   BMI 33.07 kg/m²    B/P Range last 24 hours: Systolic (65PMX), JTY:338 , Min:118 , BF    Diastolic (06ZDO), ZKO:65, Min:85, Max:90    CONST: Well developed, well nourished who appears of stated age. Awake, alert and cooperative. No apparent distress  HEENT:   Head- Normocephalic, atraumatic   Eyes- Conjunctivae pink, anicteric  Throat- Oral mucosa pink and moist  Neck-  No stridor, trachea midline, no jugular venous distention. No carotid bruit  CHEST: Chest symmetrical and non-tender to palpation. No accessory muscle use or intercostal retractions  RESPIRATORY:  Lung sounds - clear throughout fields   CARDIOVASCULAR:     Heart Inspection- shows no noted pulsations  Heart Palpation- no heaves or thrills; PMI is non-displaced   Heart Ausculation- Regular rate and rhythm. Normal S1 and S2. 2/6 systolic murmur murmur aortic area. No s3, s4 or rub   PV: 1+ pitting lower extremity edema L>R. No varicosities. Pedal pulses palpable, no clubbing or cyanosis   ABDOMEN: Soft, non-tender to light palpation. Bowel sounds present. No palpable masses no organomegaly; no abdominal bruit  MS: Good muscle strength and tone.  No atrophy or abnormal further evaluation.     Telemetry: SR    12 lead EKG 6/17/21: SR with PAC's. LVH with QRS widening / IVCD and repolarization abnormality with poor R wave progression     Echocardiogram 6/15/21:    Summary   Left ventricle is moderately enlarged .   Ejection fraction is visually estimated at 15-20%.   Overall ejection fraction severely decreased .   Severe global hypokinesis.   Normal left ventricle wall thickness.   Stage III diastolic dysfunction.   Moderately dilated right ventricle.   Right ventricle global systolic function is low normal . TAPSE 17 mm.   There is severe low flow, low gradient aortic stenosis with valve area of   0.8 sq cm by continuity equation. Aortic valve peak velocity 2.8 m/s, mean   gradient 19 mmHg, DVI 0.22, SVI 15 ml/m2.   Mild tricuspid regurgitation.  RVSP is 58 mmHg. Pulmonary hypertension is moderate .   No evidence for hemodynamically significant pericardial effusion.       Stress procedure:Stress Echocardiography, Pharmacological Stress Echo ( Dr. Abhishek Myers ):     Procedure Date  Date: 06/21/2021 Start: 09:49 AM     Study Location: Portable  Technical Quality: Adequate visualization     Indications: Aortic stenosis.     Patient Status: Routine     Height: 75 inches Weight: 275 pounds BSA: 2.51 m^2 BMI: 34.37 kg/m^2     Rhythm: Within normal limits HR: 85 bpm      Rest      ECG   Normal sinus rhythm. Stress      Stress Type: Pharmacologic      Peak HR: 110 bpm                        HR BP Product: 05495   Peak BP: 142/80 mmHg   Predicted HR: 159 bpm   % of predicted HR: 69   Test Duration: 6:00 min      Results      ECG   Normal sinus rhythm. Arrhythmias   No rhythm abnormality. Stress Protocol: Pharmacologic - Dobutamine  +--------+--------+-----+------+----------+------+-----+--------+--+--------+----+------+  ! Stage # ! Stage   ! Time ! Dosage! Other     ! Dosage! Heart! Blood   ! CP! Pain    ! Pain! Pain  !  ! !Name    !     ! ! Medication! !Rate ! Pressure! !  +--------+--------+-----+------+----------+------+-----+--------+--+--------+----+------+  ! Recovery! Recovery! 03:00!      !          !      !107  !136/80  !  !        !    !      !  +--------+--------+-----+------+----------+------+-----+--------+--+--------+----+------+  ! Recovery! Recovery! 04:00!      !          !      !106  ! 126/78  !  !        !    !      !  +--------+--------+-----+------+----------+------+-----+--------+--+--------+----+------+  ! Recovery! Recovery! 05:00!      !          !      !100  !        !  !        !    !      !  +--------+--------+-----+------+----------+------+-----+--------+--+--------+----+------+      Findings      Left Ventricle   Ejection fraction is visually estimated at 15-20%. Overall ejection   fraction severely decreased. Left ventricle is dilated. Right Ventricle   Normal right ventricle structure and function. Right Atrium   Mildly enlarged right atrium size. Aortic Valve   True severe aortic stenosis is demonstrated. The aortic valve area is 0.7   cm2 with a maximum gradient of 72 mmHg and a mean gradient of 46 mmHg. The YESI decreased from 0.7 cm2 to 0.6 cm2 at peak. The dimensionless index   remained less than 0.25 at all levels. The aortic valve peak velocity,   peak gradient and mean gradient are all in the severe range. Pericardial Effusion   No evidence for hemodynamically significant pericardial effusion. Aorta   Normal aortic root and ascending aorta. Conclusions      Summary   Dobutamine stress echocardiogram for assessment of low flow, low gradient   aortic stenosis. True severe aortic stenosis is demonstrated. The aortic valve area is 0.7 cm2 with a maximum gradient of 72 mmHg and a   mean gradient of 46 mmHg. The YESI decreased from 0.7 cm2 to 0.6 cm2 at peak. The dimensionless index   remained less than 0.25 at all levels. The aortic valve peak velocity, peak gradient and mean gradient are all in   the severe range. There is severe global hypokinesis noted. All segments suggest viability. The contractile reserve is 50% suggested benefit to prognostication of   outcome with aortic valve intervention. Signature      ----------------------------------------------------------------   Electronically signed by Jaxon Hawkins DO(Interpreting   physician) on 06/21/2021 02:20 PM   ----------------------------------------------------------------     M-Mode/2D Measurements & Calculations      CO: 4.03 l/min   CI: 1.61 l/m*m^2                                               LVOT: 2.1 cm     Doppler Measurements & Calculations       AV Peak Velocity: 4.25 m/s LVOT Peak Velocity: 0.91 m/s    AV Peak Gradient: 72.18    LVOT Mean Velocity: 0.68 m/s    mmHg                       LVOT Peak Gradient: 3.3 mmHgLVOT Mean    AV Mean Velocity: 3.27 m/s Gradient: 2 mmHg    AV Mean Gradient: 45.9    mmHg    AV VTI: 78.5 cm    AV Area (Continuity):0.6    cm^2       LVOT VTI: 13.7 cm     Coronary angiography 6/21/21:  CONCLUSIONS:  1. Coronary artery disease:  A. Left main. No significant disease. B.  LAD. Eccentric 80% to 90% very proximal vessel narrowing and 70% to  80% mid vessel disease after a moderate size diagonal branch with  myocardial bridging at the site. C. Intermediate ramus. Relatively large vessel with 95% proximal  vessel subtotal occlusion. D.  LCX. Large but nondominant vessel with mild luminal irregularities  and with 70% to 80% ostial narrowing of a small to moderate size  marginal branch. E.  RCA. Dominant vessel with around 30% to 40% proximal and mid and  distal vessel disease, and around 40% disease of the large posterior  descending artery branch.     Madina Tolentino MD    ASSESSMENT:   1. Acute heart failure reduced ejection fraction.  proBNP 5826 --> 1211  --> 2347. Diuresing well and reports resolution of his SOB  2. Cardiomyopathy, EF 15 to 20%.      3.   True AS suggested by decrease of YESI from 0.7 cm2 to 0.6 cm2. Additionally the dimensionless index remained less than 0.25 throughout. Contractile reserve was demonstrated with a 50% increase in cardiac output with administration of dobutamine. 4. CAD  5. Hypertension  6. Type 2 diabetes, insulin requiring.  A1c 8.1%  7. Obesity BMI 36.4 kg/m²  8. COVID-19 infection November 2020        PLAN:  1. Continue IV diuresis. Monitor BMP, I's and O's and daily weights  2. Rest of cardiac medications same  3.  Seen By Dr. Charisma Mcmanus ( CT surgery ) --> Heart team discussion tomorrow    Electronically signed by Lalo Case MD on 6/22/2021 at 7:08 PM

## 2021-06-22 NOTE — TELEPHONE ENCOUNTER
Patient agreeable to attend CHF clinic, order placed and CHF clinic notified to contact patient for scheduling.

## 2021-06-22 NOTE — CONSULTS
CTS Consult    Patient name: Espiridion Holter    Reason for consult: CAD, AS    Referring Physician: Dr. Darryl Nava    Primary Care Physician: Jyotsna Garcia III, DO    Date of service: 6/22/2021    Chief Complaint: Leg swelling    HPI: 63year old man admitted with leg swelling. He states this was getting progressively worse and was admitted. He was found to be in CHF and was diuresed. He was found to have severe AS and heart cath was done showing CAD. Currently he denies CP, SOB, HAMILTON, increasing LE edema, N/V, F/C, orthopnea, PND and syncope.     Allergies: No Known Allergies    Home medications:    Current Facility-Administered Medications   Medication Dose Route Frequency Provider Last Rate Last Admin    insulin lispro (HUMALOG) injection vial 0-6 Units  0-6 Units Subcutaneous TID WC Jessica Healy MD   4 Units at 06/22/21 1105    insulin lispro (HUMALOG) injection vial 0-3 Units  0-3 Units Subcutaneous Nightly Jessica Healy MD   2 Units at 06/21/21 5275    glucose (GLUTOSE) 40 % oral gel 15 g  15 g Oral PRN Jessica Healy MD        dextrose 50 % IV solution  12.5 g Intravenous PRN Jessica Healy MD        glucagon (rDNA) injection 1 mg  1 mg Intramuscular PRN Jessica Healy MD        dextrose 5 % solution  100 mL/hr Intravenous PRN Jessica Healy MD        furosemide (LASIX) injection 40 mg  40 mg Intravenous BID Jessica Healy MD   40 mg at 06/22/21 0754    atorvastatin (LIPITOR) tablet 40 mg  40 mg Oral Nightly Jessica Healy MD   40 mg at 06/21/21 2130    metoprolol succinate (TOPROL XL) extended release tablet 50 mg  50 mg Oral Daily Jessica Healy MD   50 mg at 06/22/21 0754    perflutren lipid microspheres (DEFINITY) injection 1.65 mg  1.5 mL Intravenous ONCE PRN Jessica Healy MD        aspirin EC tablet 81 mg  81 mg Oral Daily Jessica Healy MD   81 mg at 06/22/21 0753    lisinopril (PRINIVIL;ZESTRIL) tablet 20 mg  20 mg Oral BID Jessica Healy MD   20 mg at 06/22/21 0753    sodium chloride flush 0.9 % injection 5-40 mL  5-40 mL Intravenous 2 times per day Rosette Schaefer MD   10 mL at 21 0804    sodium chloride flush 0.9 % injection 10 mL  10 mL Intravenous PRN Rosette Schaefer MD        0.9 % sodium chloride infusion  25 mL Intravenous PRN Rosette Schaefer MD        magnesium hydroxide (MILK OF MAGNESIA) 400 MG/5ML suspension 30 mL  30 mL Oral Daily PRN Rosette Schaefer MD        acetaminophen (TYLENOL) tablet 650 mg  650 mg Oral Q6H PRN Rosette Schaefer MD        Or    acetaminophen (TYLENOL) suppository 650 mg  650 mg Rectal Q6H PRN Rosette Schaefer MD        enoxaparin (LOVENOX) injection 40 mg  40 mg Subcutaneous Daily Rosette Schaefer MD   40 mg at 21 0754    glipiZIDE (GLUCOTROL) tablet 5 mg  5 mg Oral BID AC Hernandez Crome Gena Arzaet MD   5 mg at 21 2861    spironolactone (ALDACTONE) tablet 25 mg  25 mg Oral Daily Rosette Schaefer MD   25 mg at 21 8205       Past Medical History:  Past Medical History:   Diagnosis Date    Hypertension        Past Surgical History:  No past surgical history on file.     Social History:  Social History     Socioeconomic History    Marital status:      Spouse name: Not on file    Number of children: Not on file    Years of education: Not on file    Highest education level: Not on file   Occupational History    Not on file   Tobacco Use    Smoking status: Former Smoker     Packs/day: 1.50     Types: Cigarettes     Start date: 6/15/1973     Quit date: 6/15/2001     Years since quittin.0    Smokeless tobacco: Never Used   Vaping Use    Vaping Use: Never used   Substance and Sexual Activity    Alcohol use: Yes     Comment: occasional    Drug use: No    Sexual activity: Not on file   Other Topics Concern    Not on file   Social History Narrative    Not on file     Social Determinants of Health     Financial Resource Strain:     Difficulty of Paying Living Expenses:    Food Insecurity:     Worried About Running Out of Food in distress. HEENT: Head is normocephalic, atraumatic. EOMs intact, PERRL. Trachea midline. Lungs: Normal respiratory rate and normal effort. He is not in respiratory distress. Breath sounds clear to auscultation. No wheezes. Heart: Normal rate. Regular rhythm. S1 normal and S2 normal. Positive for murmur. Chest: Symmetric chest wall expansion. Extremities: Normal range of motion. Neurological: Patient is alert and oriented to person, place and time. Patient has normal reflexes. Skin: Warm and dry. Abdomen: Abdomen is soft and non-distended. Bowel sounds are normal. There is no abdominal tenderness tenderness. There is no guarding. There is no mass. Pulses: Distal pulses are intact. Skin: Warm and dry without lesions. Assessment: AS, CAD        Plan: Full pre operative work up. Heart team discussion tomorrow.       Electronically signed by Amina Glynn MD on 6/22/2021 at 11:40 AM

## 2021-06-22 NOTE — PLAN OF CARE
Problem: OXYGENATION/RESPIRATORY FUNCTION  Goal: Patient will maintain patent airway  6/22/2021 1413 by Alberto Adkins RN  Outcome: Met This Shift  6/22/2021 0125 by Yanira Hyde RN  Outcome: Met This Shift     Problem: HEMODYNAMIC STATUS  Goal: Patient has stable vital signs and fluid balance  6/22/2021 1413 by Alberto Adkins RN  Outcome: Met This Shift  6/22/2021 0125 by Yanira Hyde RN  Outcome: Met This Shift     Problem: Falls - Risk of:  Goal: Will remain free from falls  Description: Will remain free from falls  6/22/2021 1413 by Alberto Adkins RN  Outcome: Met This Shift  6/22/2021 0125 by Yanira Hyde RN  Outcome: Met This Shift     Problem: Falls - Risk of:  Goal: Absence of physical injury  Description: Absence of physical injury  6/22/2021 1413 by Alberto Adkins RN  Outcome: Met This Shift  6/22/2021 0125 by Yanira Hyde RN  Outcome: Met This Shift

## 2021-06-22 NOTE — CONSULTS
Patient currently admitted with diagnosis of Systolic heart failure. Patient sitting on edge of bed during consultation. He is agreeable to attend the CHF clinic and scheduling of PCP appointment. He was engaged and asked appropriate questions during the discussion. Future Appointments   Date Time Provider Alcon Yoo   7/14/2021  9:15 AM [facility] CHF ROOM [facility]            We reviewed the introduction to Heart Failure, the HF zones, signs and symptoms to report on day 1 of onset, medications, medication compliance, the importance of obtaining daily weights, following a low sodium diet, reading food labels for the sodium content, keeping physician appointments, and smoking cessation. We discussed writing / tracking daily weights on a calendar / log because a 5 pound gain in 1 week can sneak up if you are not tracking it. You can also take your charted weights to your doctor appointments to be reviewed. Contributing risk factors for Heart Failure are identified as overwhelmed with the complexity of his medical management routine. Additional at home stessors. .  I advised patient they can reduce the risk for Heart Failure exacerbations by modifying / controlling the risk factors. We discussed self-managed care which includes the following:  to take medications as prescribed, report any intolerable side effects of medications to the cardiologist / doctor, do not just stop taking the medication; follow a cardiac heart healthy / low sodium diet; weigh yourself daily, exercise regularly- per doctor recommendation and not to smoke or use an excess amount of alcohol. We discussed calling the cardiologist / doctor with a weight gain of 3 pounds in one day or a total of 5 pounds or more in one week. Also, if you should have a significant weight loss of 3# or more in one day to call the doctor, they may need to decrease or hold the diuretic dose.  On days you feels nauseated and not eating Failure? · Things You Can Do to Live Well with HF  · How to Take Your Medications  · How to Eat Less Salt  · Exercising Well with Heart Failure  · Signs and symptoms of HF to report  · Weight Yourself Each Day  · Home Self Management- activity, weight tracking, taking medications as prescribed, meals /diet planning (sodium and fluid restriction), how to read food labels, keeping physician follow ups, smoking cessation, follow the Heart Failure Zones  · The Heart Failure zones  · Sodium content pamphlet  · What foods I should avoid tip sheet    Instructed  to call 911 if you have any of the following symptoms:    ·    Struggling to breathe unrelieved with rest,  ·    Having chest pain, confusion or can't think clearly  ·    Have confusion or cant think clearly    Readmission Risk Score: 12        Discharge Plan:  I placed the Heart Failure Home Instructions in patient's discharge instructions. Per Heart Failure [facility]WT[facility], the patient should have a follow-up appointment made within 7 days of discharge. Renetta Yun, RN BSN, RN  Heart Failure Navigator        CONGESTIVE HEART FAILURE (CHF) GUIDELINES  (Must be completed for Primary Diagnosis CHF or History of CHF)    Type of CHF:    [x] Acute   [] Chronic     [] Acute on Chronic     Ventricular Function Assessment (check):             [] HFpEF  [] HFpEF, borderline  [] HFpEF, improved  [x] HFrEF  Ejection Fraction (%):    20                                                                 Angiotensin-Converting-Enzyme (ACE) inhibitor ordered:  [x] Yes  [] No (specify contraindication):  [] Renal Insufficiency  [] Cough  [] Hypotension  [] Allergy/angioedema  [] No left ventricular systolic dysfunction (LVSD)  [] Hyperkalemia  [] Moderate to severe aortic stenosis  [] Other (Specify):     Angiotensin II receptor blockers (ARB) ordered:  [] Yes  [x] No (specify contraindication):  [] Renal Insufficiency  [] Hypotension  [] Allergy/angioedema  [] No LVSD  [] Hyperkalemia  [] Moderate to severe aortic stenosis  [] Other (Specify):      ARNI - Angiotensin Receptor Neprilysin Inhibitor ordered:  [] Yes  [x] No      ACC/AHA Guidelines Beta Blocker (Carvedilol, Metoprolol Succinate, or Bisoprolol) ordered:    [x] Yes  [] No (specify contraindication):  [] Bradycardia  [] Hypotension  [] LVD  [] 2nd or 3rd degree heart block  [] Bronchospastic airway disease  [] Decompensated CHF  [] Other (Specify):

## 2021-06-22 NOTE — PATIENT CARE CONFERENCE
P Quality Flow/Interdisciplinary Rounds Progress Note        Quality Flow Rounds held on June 22, 2021    Disciplines Attending:  Bedside Nurse, ,  and Nursing Unit Leadership    Naa Rodrigues was admitted on 6/17/2021  8:18 AM    Anticipated Discharge Date:  Expected Discharge Date: 06/25/21    Disposition:    Benjie Score:  Benjie Scale Score: 19    Readmission Risk              Risk of Unplanned Readmission:  12           Discussed patient goal for the day, patient clinical progression, and barriers to discharge.   The following Goal(s) of the Day/Commitment(s) have been identified:  Discharge - [address], RN  June 22, 2021

## 2021-06-23 VITALS
WEIGHT: 261.6 LBS | DIASTOLIC BLOOD PRESSURE: 70 MMHG | RESPIRATION RATE: 18 BRPM | OXYGEN SATURATION: 97 % | HEART RATE: 88 BPM | TEMPERATURE: 96.8 F | HEIGHT: 75 IN | SYSTOLIC BLOOD PRESSURE: 131 MMHG | BODY MASS INDEX: 32.53 KG/M2

## 2021-06-23 LAB
METER GLUCOSE: 214 MG/DL (ref 74–99)
METER GLUCOSE: 392 MG/DL (ref 74–99)
MRSA CULTURE ONLY: NORMAL
PRO-BNP: 1317 PG/ML (ref 0–125)

## 2021-06-23 PROCEDURE — 36415 COLL VENOUS BLD VENIPUNCTURE: CPT

## 2021-06-23 PROCEDURE — 6370000000 HC RX 637 (ALT 250 FOR IP): Performed by: INTERNAL MEDICINE

## 2021-06-23 PROCEDURE — 83880 ASSAY OF NATRIURETIC PEPTIDE: CPT

## 2021-06-23 PROCEDURE — 6360000002 HC RX W HCPCS: Performed by: INTERNAL MEDICINE

## 2021-06-23 PROCEDURE — 82962 GLUCOSE BLOOD TEST: CPT

## 2021-06-23 PROCEDURE — 2580000003 HC RX 258: Performed by: INTERNAL MEDICINE

## 2021-06-23 PROCEDURE — 99232 SBSQ HOSP IP/OBS MODERATE 35: CPT | Performed by: INTERNAL MEDICINE

## 2021-06-23 RX ORDER — FUROSEMIDE 40 MG/1
40 TABLET ORAL 2 TIMES DAILY
Status: DISCONTINUED | OUTPATIENT
Start: 2021-06-23 | End: 2021-06-23 | Stop reason: HOSPADM

## 2021-06-23 RX ORDER — ATORVASTATIN CALCIUM 40 MG/1
40 TABLET, FILM COATED ORAL NIGHTLY
Qty: 30 TABLET | Refills: 3 | Status: SHIPPED | OUTPATIENT
Start: 2021-06-23 | End: 2021-10-13

## 2021-06-23 RX ORDER — SPIRONOLACTONE 25 MG/1
25 TABLET ORAL DAILY
Qty: 30 TABLET | Refills: 3 | Status: ON HOLD
Start: 2021-06-24 | End: 2021-09-07 | Stop reason: HOSPADM

## 2021-06-23 RX ORDER — METOPROLOL SUCCINATE 50 MG/1
50 TABLET, EXTENDED RELEASE ORAL DAILY
Qty: 30 TABLET | Refills: 3 | Status: ON HOLD
Start: 2021-06-24 | End: 2021-09-07 | Stop reason: HOSPADM

## 2021-06-23 RX ORDER — FUROSEMIDE 40 MG/1
40 TABLET ORAL 2 TIMES DAILY
Qty: 60 TABLET | Refills: 3 | Status: ON HOLD
Start: 2021-06-23 | End: 2021-09-07 | Stop reason: HOSPADM

## 2021-06-23 RX ADMIN — INSULIN LISPRO 10 UNITS: 100 INJECTION, SOLUTION INTRAVENOUS; SUBCUTANEOUS at 11:46

## 2021-06-23 RX ADMIN — FUROSEMIDE 40 MG: 10 INJECTION, SOLUTION INTRAVENOUS at 09:27

## 2021-06-23 RX ADMIN — GLIPIZIDE 5 MG: 5 TABLET ORAL at 06:20

## 2021-06-23 RX ADMIN — INSULIN LISPRO 4 UNITS: 100 INJECTION, SOLUTION INTRAVENOUS; SUBCUTANEOUS at 09:27

## 2021-06-23 RX ADMIN — METOPROLOL SUCCINATE 50 MG: 50 TABLET, EXTENDED RELEASE ORAL at 07:37

## 2021-06-23 RX ADMIN — LISINOPRIL 20 MG: 20 TABLET ORAL at 07:37

## 2021-06-23 RX ADMIN — SPIRONOLACTONE 25 MG: 25 TABLET ORAL at 07:37

## 2021-06-23 RX ADMIN — ASPIRIN 81 MG: 81 TABLET, COATED ORAL at 07:37

## 2021-06-23 RX ADMIN — Medication 10 ML: at 07:38

## 2021-06-23 RX ADMIN — ENOXAPARIN SODIUM 40 MG: 40 INJECTION SUBCUTANEOUS at 07:37

## 2021-06-23 ASSESSMENT — PAIN SCALES - GENERAL: PAINLEVEL_OUTOF10: 0

## 2021-06-23 NOTE — PLAN OF CARE
Problem: OXYGENATION/RESPIRATORY FUNCTION  Goal: Patient will maintain patent airway  Outcome: Completed     Problem: HEMODYNAMIC STATUS  Goal: Patient has stable vital signs and fluid balance  Outcome: Completed     Problem: Falls - Risk of:  Goal: Will remain free from falls  Description: Will remain free from falls  Outcome: Completed  Goal: Absence of physical injury  Description: Absence of physical injury  Outcome: Completed     Problem: Pain:  Goal: Pain level will decrease  Description: Pain level will decrease  Outcome: Completed  Goal: Control of acute pain  Description: Control of acute pain  Outcome: Completed  Goal: Control of chronic pain  Description: Control of chronic pain  Outcome: Completed

## 2021-06-23 NOTE — PROGRESS NOTES
Inpatient Cardiology Progress note     PATIENT IS BEING FOLLOWED FOR: Follow-up for new onset HFrEF, aortic stenosis       Raul Hart is a 63 y.o. male seen in initial consultation this admission by Dr. Anastasia Jackson: Denies SOB. Denies CP. Denies lightheadedness. OBJECTIVE: No apparent distress. Continues to diurese well. Had Dobutamine stress Echo earlier this am ( see below )    ROS:  Consist: Denies fevers, chills or night sweats  Heart: Denies chest pain, palpitations, lightheadedness, dizziness or syncope  Lungs: Denies SOB, cough, wheezing, orthopnea or PND  GI: Denies abdominal pain, vomiting or diarrhea    PHYSICAL EXAM:   BP (!) 118/92   Pulse 77   Temp 98.2 °F (36.8 °C) (Temporal)   Resp 18   Ht 6' 3\" (1.905 m)   Wt 261 lb 9.6 oz (118.7 kg)   SpO2 96%   BMI 32.70 kg/m²    B/P Range last 24 hours: Systolic (52RCI), ALH:063 , Min:116 , VXS:913    Diastolic (82JQJ), HDH:09, Min:73, Max:92    CONST: Well developed, well nourished who appears of stated age. Awake, alert and cooperative. No apparent distress  HEENT:   Head- Normocephalic, atraumatic   Eyes- Conjunctivae pink, anicteric  Throat- Oral mucosa pink and moist  Neck-  No stridor, trachea midline, no jugular venous distention. No carotid bruit  CHEST: Chest symmetrical and non-tender to palpation. No accessory muscle use or intercostal retractions  RESPIRATORY:  Lung sounds - clear throughout fields   CARDIOVASCULAR:     Heart Inspection- shows no noted pulsations  Heart Palpation- no heaves or thrills; PMI is non-displaced   Heart Ausculation- Regular rate and rhythm. Normal S1 and S2. 2/6 systolic murmur murmur aortic area. No s3, s4 or rub   PV: 1+ pitting lower extremity edema L>R. No varicosities. Pedal pulses palpable, no clubbing or cyanosis   ABDOMEN: Soft, non-tender to light palpation. Bowel sounds present. No palpable masses no organomegaly; no abdominal bruit  MS: Good muscle strength and tone.  No atrophy or abnormal movements. : Deferred  SKIN: Warm and dry no statis dermatitis or ulcers   NEURO / PSYCH: Oriented to person, place and time. Speech clear and appropriate. Follows all commands. Flat affect       Intake/Output Summary (Last 24 hours) at 6/23/2021 1052  Last data filed at 6/23/2021 0859  Gross per 24 hour   Intake 600 ml   Output 2950 ml   Net -2350 ml       Weight:   Wt Readings from Last 3 Encounters:   06/23/21 261 lb 9.6 oz (118.7 kg)   06/16/21 296 lb 12.8 oz (134.6 kg)     Current Inpatient Medications:   insulin lispro  0-12 Units Subcutaneous TID WC    insulin lispro  0-6 Units Subcutaneous Nightly    furosemide  40 mg Intravenous BID    atorvastatin  40 mg Oral Nightly    metoprolol succinate  50 mg Oral Daily    aspirin  81 mg Oral Daily    lisinopril  20 mg Oral BID    sodium chloride flush  5-40 mL Intravenous 2 times per day    enoxaparin  40 mg Subcutaneous Daily    glipiZIDE  5 mg Oral BID AC    spironolactone  25 mg Oral Daily       IV Infusions (if any):   dextrose      sodium chloride         DIAGNOSTIC/ LABORATORY DATA:  Labs:   CBC:   Recent Labs     06/21/21  0703   WBC 8.3   HGB 16.9*   HCT 53.4        BMP:   Recent Labs     06/21/21  0703 06/22/21  0513    134   K 4.2 3.9   CO2 29 27   BUN 20 16   CREATININE 0.9 0.9   LABGLOM >60 >60   CALCIUM 9.6 9.5     Mag:   Recent Labs     06/21/21  0703 06/22/21  0513   MG 1.9 1.8       HgA1c:   Lab Results   Component Value Date    LABA1C 8.1 (H) 06/15/2021     CARDIAC ENZYMES:  No results for input(s): CKTOTAL, CKMB, CKMBINDEX, TROPHS in the last 72 hours. FASTING LIPID PANEL:  Lab Results   Component Value Date    CHOL 124 06/18/2021    HDL 33 06/22/2021    LDLCALC 87 06/22/2021    TRIG 70 06/18/2021       CXR 6/17/21:   1. Multifocal bilateral airspace disease   2. Possible underlying emphysema and interstitial lung disease.   3. Given that there are no prior studies available for review follow-up CTscan is recommended for further evaluation.     Telemetry: SR    12 lead EKG 6/17/21: SR with PAC's. LVH with QRS widening / IVCD and repolarization abnormality with poor R wave progression     Echocardiogram 6/15/21:    Summary   Left ventricle is moderately enlarged .   Ejection fraction is visually estimated at 15-20%.   Overall ejection fraction severely decreased .   Severe global hypokinesis.   Normal left ventricle wall thickness.   Stage III diastolic dysfunction.   Moderately dilated right ventricle.   Right ventricle global systolic function is low normal . TAPSE 17 mm.   There is severe low flow, low gradient aortic stenosis with valve area of   0.8 sq cm by continuity equation. Aortic valve peak velocity 2.8 m/s, mean   gradient 19 mmHg, DVI 0.22, SVI 15 ml/m2.   Mild tricuspid regurgitation.  RVSP is 58 mmHg. Pulmonary hypertension is moderate .   No evidence for hemodynamically significant pericardial effusion.       Stress procedure:Stress Echocardiography, Pharmacological Stress Echo ( Dr. Eliana Smith ):     Procedure Date  Date: 06/21/2021 Start: 09:49 AM     Study Location: Portable  Technical Quality: Adequate visualization     Indications: Aortic stenosis.     Patient Status: Routine     Height: 75 inches Weight: 275 pounds BSA: 2.51 m^2 BMI: 34.37 kg/m^2     Rhythm: Within normal limits HR: 85 bpm      Rest      ECG   Normal sinus rhythm. Stress      Stress Type: Pharmacologic      Peak HR: 110 bpm                        HR BP Product: 26260   Peak BP: 142/80 mmHg   Predicted HR: 159 bpm   % of predicted HR: 69   Test Duration: 6:00 min      Results      ECG   Normal sinus rhythm. Arrhythmias   No rhythm abnormality. Stress Protocol: Pharmacologic - Dobutamine  +--------+--------+-----+------+----------+------+-----+--------+--+--------+----+------+  ! Stage # ! Stage   ! Time ! Dosage! Other     ! Dosage! Heart! Blood   ! CP! Pain    ! Pain! Pain  !  ! !Name    !     ! ! Medication! !Rate ! Pressure! !Location! Type! Action! +--------+--------+-----+------+----------+------+-----+--------+--+--------+----+------+  !1.0     ! !01:00!10.00 !          !      !85   !142/80  !  !        !    !      !  +--------+--------+-----+------+----------+------+-----+--------+--+--------+----+------+  !2.0     !        !     !      !          !      !86   !        !  !        !    !      !  +--------+--------+-----+------+----------+------+-----+--------+--+--------+----+------+  !3.0     ! !03:00!20.00 !          !      !80   !120/82  !  !        !    !      !  +--------+--------+-----+------+----------+------+-----+--------+--+--------+----+------+  !4.0     !        !     !      !          !      !84   !        !  !        !    !      !  +--------+--------+-----+------+----------+------+-----+--------+--+--------+----+------+  !5.0     !        !     !      !          !      !88   !124/82  !  !        !    !      !  +--------+--------+-----+------+----------+------+-----+--------+--+--------+----+------+  ! 6.0     ! !06:00!30.00 !          !      !80   !        !  !        !    !      !  +--------+--------+-----+------+----------+------+-----+--------+--+--------+----+------+  !7.0     !        !     !      !          !      !87   !        !  !        !    !      !  +--------+--------+-----+------+----------+------+-----+--------+--+--------+----+------+  !8.0     !        !     !      !          !      !94   !130/82  !  !        !    !      !  +--------+--------+-----+------+----------+------+-----+--------+--+--------+----+------+  !9.0     !        !     !      !          !      !100  !136/80  !  !        !    !      !  +--------+--------+-----+------+----------+------+-----+--------+--+--------+----+------+  ! Recovery! Recovery! 01:00!      !          !      !110  !138/80  !  !        !    ! !  +--------+--------+-----+------+----------+------+-----+--------+--+--------+----+------+  ! Recovery! Recovery! 03:00!      !          !      !107  !136/80  !  !        !    !      !  +--------+--------+-----+------+----------+------+-----+--------+--+--------+----+------+  ! Recovery! Recovery! 04:00!      !          !      !106  ! 126/78  !  !        !    !      !  +--------+--------+-----+------+----------+------+-----+--------+--+--------+----+------+  ! Recovery! Recovery! 05:00!      !          !      !100  !        !  !        !    !      !  +--------+--------+-----+------+----------+------+-----+--------+--+--------+----+------+      Findings      Left Ventricle   Ejection fraction is visually estimated at 15-20%. Overall ejection   fraction severely decreased. Left ventricle is dilated. Right Ventricle   Normal right ventricle structure and function. Right Atrium   Mildly enlarged right atrium size. Aortic Valve   True severe aortic stenosis is demonstrated. The aortic valve area is 0.7   cm2 with a maximum gradient of 72 mmHg and a mean gradient of 46 mmHg. The YESI decreased from 0.7 cm2 to 0.6 cm2 at peak. The dimensionless index   remained less than 0.25 at all levels. The aortic valve peak velocity,   peak gradient and mean gradient are all in the severe range. Pericardial Effusion   No evidence for hemodynamically significant pericardial effusion. Aorta   Normal aortic root and ascending aorta. Conclusions      Summary   Dobutamine stress echocardiogram for assessment of low flow, low gradient   aortic stenosis. True severe aortic stenosis is demonstrated. The aortic valve area is 0.7 cm2 with a maximum gradient of 72 mmHg and a   mean gradient of 46 mmHg. The YESI decreased from 0.7 cm2 to 0.6 cm2 at peak. The dimensionless index   remained less than 0.25 at all levels. The aortic valve peak velocity, peak gradient and mean gradient are all in   the severe range. There is severe global hypokinesis noted. All segments suggest viability. The contractile reserve is 50% suggested benefit to prognostication of   outcome with aortic valve intervention. Signature      ----------------------------------------------------------------   Electronically signed by Sanjuana Ny DO(Interpreting   physician) on 06/21/2021 02:20 PM   ----------------------------------------------------------------     M-Mode/2D Measurements & Calculations      CO: 4.03 l/min   CI: 1.61 l/m*m^2                                               LVOT: 2.1 cm     Doppler Measurements & Calculations       AV Peak Velocity: 4.25 m/s LVOT Peak Velocity: 0.91 m/s    AV Peak Gradient: 72.18    LVOT Mean Velocity: 0.68 m/s    mmHg                       LVOT Peak Gradient: 3.3 mmHgLVOT Mean    AV Mean Velocity: 3.27 m/s Gradient: 2 mmHg    AV Mean Gradient: 45.9    mmHg    AV VTI: 78.5 cm    AV Area (Continuity):0.6    cm^2       LVOT VTI: 13.7 cm     Coronary angiography 6/21/21:  CONCLUSIONS:  1. Coronary artery disease:  A. Left main. No significant disease. B.  LAD. Eccentric 80% to 90% very proximal vessel narrowing and 70% to  80% mid vessel disease after a moderate size diagonal branch with  myocardial bridging at the site. C. Intermediate ramus. Relatively large vessel with 95% proximal  vessel subtotal occlusion. D.  LCX. Large but nondominant vessel with mild luminal irregularities  and with 70% to 80% ostial narrowing of a small to moderate size  marginal branch. E.  RCA. Dominant vessel with around 30% to 40% proximal and mid and  distal vessel disease, and around 40% disease of the large posterior  descending artery branch.     Gabriela Borges MD    ASSESSMENT:   1. Acute heart failure reduced ejection fraction.  proBNP 5826 --> 3521  --> 2347. Diuresing well and reports resolution of his SOB  2. Cardiomyopathy, EF 15 to 20%.      3.   True AS suggested by decrease of YESI from 0.7 cm2 to 0.6 cm2. Additionally the dimensionless index remained less than 0.25 throughout. Contractile reserve was demonstrated with a 50% increase in cardiac output with administration of dobutamine. 4. CAD  5. Hypertension  6. Type 2 diabetes, insulin requiring.  A1c 8.1%  7. Obesity BMI 36.4 kg/m²  8. COVID-19 infection November 2020        PLAN:  1. Will change IV Lasix to PO  2. Rest of cardiac medications same  3.  [id number] [address] Marshall for discharge from cardiology stand point to follow with CT surgery early next week        Electronically signed by Ruth Oneil MD on 6/23/2021 at 10:52 AM

## 2021-06-23 NOTE — DISCHARGE SUMMARY
Physician Discharge Summary     Patient ID:  Raul Hart  [id number]  63 y.o.  1960    Admit date: 6/17/2021    Discharge date and time: 6/23/2021    Admission Diagnoses:   Patient Active Problem List   Diagnosis    Acute systolic congestive heart failure (HCC)    Class 2 severe obesity due to excess calories with serious comorbidity in adult [facility])    Uncontrolled type 2 diabetes mellitus with hyperglycemia [facility]    Essential hypertension    Severe aortic stenosis    Acute systolic CHF (congestive heart failure) ([facility]    Heart failure ([facility]       Discharge Diagnoses: Multivessel coronary artery disease, severe aortic stenosis    Consults: Cardiothoracic cardiology    Procedures: Left heart cath    Hospital Course:  Admit to [facility] for evaluation of new  CMP-ejection fraction 15%  [facility] to assess for ischemic etiology-multivessel coronary artery disease with severe aortic stenosis  Await CABG/TAVR-will be done in the near future-patient is cleared for discharge by cardiology and cardiothoracic with appointment set up to see Dr. Azalia Walters within the next few days to discuss  life vest , GDMT to include Entresto (renal function permitting), beta-blocker, diuretic, Aldactone   Received dobutamine drip during inpatient stay  Blood pressure better today with initiation of lisinopril diuretic beta-blocker  Stop drinking energy drinks-discussed with patient  a1c and lipid panel-normal -A1c 8.1 -resume home medications including insulin and p.o. meds  May benefit from antidepressant as an outpatient    Discussed the importance of medication compliance and importance of having surgery        Recent Labs     06/21/21  0703   WBC 8.3   HGB 16.9*   HCT 53.4          Recent Labs     06/21/21  0703 06/22/21  0513    134   K 4.2 3.9   CL 97* 96*   CO2 29 27   BUN 20 16   CREATININE 0.9 0.9   CALCIUM 9.6 9.5       XR CHEST (2 VW)    Result Date: 6/17/2021  EXAMINATION: TWO XRAY VIEWS OF THE CHEST 6/17/2021 9:43 am COMPARISON: None. HISTORY: ORDERING SYSTEM PROVIDED HISTORY: SOB TECHNOLOGIST PROVIDED HISTORY: Reason for exam:->SOB What reading provider will be dictating this exam?->CRC FINDINGS: The heart is mildly enlarged. Multifocal bilateral airspace disease is noted. I cannot exclude underlying interstitial lung disease. There is no gross pleural effusion. 1. Multifocal bilateral airspace disease 2. Possible underlying emphysema and interstitial lung disease. 3. Given that there are no prior studies available for review follow-up CT scan is recommended for further evaluation. Discharge Exam:    HEENT: NCAT,  PERRLA, No JVD  Heart:  RRR, no murmurs, gallops, or rubs.   Lungs:  CTA bilaterally, no wheeze, rales or rhonchi  Abd: bowel sounds present, nontender, nondistended, no masses  Extrem: Still with mild bilateral lower extremity edema    Disposition: home     Patient Condition at Discharge: Stable    Patient Instructions:      Medication List      START taking these medications    atorvastatin 40 MG tablet  Commonly known as: LIPITOR  Take 1 tablet by mouth nightly     furosemide 40 MG tablet  Commonly known as: LASIX  Take 1 tablet by mouth 2 times daily     spironolactone 25 MG tablet  Commonly known as: ALDACTONE  Take 1 tablet by mouth daily  Start taking on: June 24, 2021        CONTINUE taking these medications    amLODIPine 10 MG tablet  Commonly known as: NORVASC     aspirin 81 MG EC tablet     Basaglar KwikPen 100 UNIT/ML injection pen  Generic drug: insulin glargine     glyBURIDE 5 MG tablet  Commonly known as: DIABETA     lisinopril 20 MG tablet  Commonly known as: PRINIVIL;ZESTRIL     metoprolol succinate 25 MG extended release tablet  Commonly known as: TOPROL XL  Take 1 tablet by mouth daily           Where to Get Your Medications      These medications were sent to [facility] [address]    Phone: [phone]   · atorvastatin 40 MG tablet  · furosemide 40 MG tablet  · spironolactone 25 MG tablet       Activity: activity as tolerated  Diet: regular diet    Pt has been advised to: Follow-up with TEE JIANG III, DO in 1 week.   Follow-up with consultants as recommended by them    Note that over 30 minutes was spent in preparing discharge papers, discussing discharge with patient, medication review, etc.    Signed:  Ana Chavez MD  6/23/2021  12:17 PM

## 2021-06-23 NOTE — PROGRESS NOTES
Discharge instructions, appointments and medications reviewed with patient. Patient waiting for pharmacy to deliver medications then to be discharge home via private car. Patient not sure if he has metoprolol at home. [facility] and patient will check at home to see if he needs rx or not. Patient educated on heart failure, checking weight daily and eating heart healthy diet. Patient states he just want to get affairs together at home then will be back for surgery. Patient states he will follow up with Dr. Candido Licea on Tuesday.

## 2021-06-23 NOTE — CONSULTS
ConsultationNote    Patient's Name/Date of Birth: Kathrin Jaime / 1960 (37 y.o.)    Date: 2021     Reason for Consult:  Dental clearance for valve surgery     Requesting Physician:  Richard Louis MD    HPI  Pt presents to dental clinic for dental evaluation prior to valve surgery. Pt reports no current dental pain or discomfort, but reports that he hasn't been seen by his dentist in a few years. Past Medical History:   Diagnosis Date    Hypertension        No past surgical history on file. Prior to Admission medications    Medication Sig Start Date End Date Taking?  Authorizing Provider   amLODIPine (NORVASC) 10 MG tablet Take 10 mg by mouth daily   Yes Historical Provider, MD   metoprolol succinate (TOPROL XL) 25 MG extended release tablet Take 1 tablet by mouth daily 21  Yes Sumit Velazquez MD   aspirin 81 MG EC tablet Take 81 mg by mouth daily   Yes Historical Provider, MD   insulin glargine (BASAGLAR KWIKPEN) 100 UNIT/ML injection pen Inject 50 Units into the skin nightly    Historical Provider, MD   glyBURIDE (DIABETA) 5 MG tablet Take 5 mg by mouth 2 times daily     Historical Provider, MD   lisinopril (PRINIVIL;ZESTRIL) 20 MG tablet Take 20 mg by mouth 2 times daily     Historical Provider, MD       No Known Allergies    Family History   Problem Relation Age of Onset    Other Mother         esophageal tumor    Heart Disease Father     Breast Cancer Sister     No Known Problems Brother     No Known Problems Brother     No Known Problems Sister        Social History     Socioeconomic History    Marital status:      Spouse name: Not on file    Number of children: Not on file    Years of education: Not on file    Highest education level: Not on file   Occupational History    Not on file   Tobacco Use    Smoking status: Former Smoker     Packs/day: 1.50     Types: Cigarettes     Start date: 6/15/1973     Quit date: 6/15/2001     Years since quittin.0    Smokeless tobacco: Never Used   Vaping Use    Vaping Use: Never used   Substance and Sexual Activity    Alcohol use: Yes     Comment: occasional    Drug use: No    Sexual activity: Not on file   Other Topics Concern    Not on file   Social History Narrative    Not on file     Social Determinants of Health     Financial Resource Strain:     Difficulty of Paying Living Expenses:    Food Insecurity:     Worried About Running Out of Food in the Last Year:     [address] in the Last Year:    Transportation Needs:     Lack of Transportation (Medical):  Lack of Transportation (Non-Medical):    Physical Activity:     Days of Exercise per Week:     Minutes of Exercise per Session:    Stress:     Feeling of Stress :    Social Connections:     Frequency of Communication with Friends and Family:     Frequency of Social Gatherings with Friends and Family:     Attends Anab[employer]ism Services:     Active Member of Clubs or Organizations:     Attends Club or Organization Meetings:     Marital Status:    Intimate Partner Violence:     Fear of Current or Ex-Partner:     Emotionally Abused:     Physically Abused:     Sexually Abused:        Review of Systems      Vitals:    06/22/21 2343 06/23/21 0419 06/23/21 0648 06/23/21 0651   BP: (!) 120/91 116/76 (!) 118/92    Pulse: 78 75 77    Resp: 16 16 18    Temp: 97.7 °F (36.5 °C) 97.6 °F (36.4 °C) 98.2 °F (36.8 °C)    TempSrc: Oral Oral Temporal    SpO2: 96% 95% 96%    Weight:    261 lb 9.6 oz (118.7 kg)   Height:         Physical Exam  Panoramic, 4 bitewings and 4 periapical radiographs made and reviewed. Moderate to severe horizontal bone loss noted. Clinical evaluation completed including periodontal probing and charting. Pt has chronic moderate to severe periodontal disease noted. Pt has extensive calculus build-up. No carious teeth showing and immediate risk of infection.     Assessment:  Active Problems:    Heart failure ([address]  Resolved Problems:    * No resolved hospital problems. *      Plan:  1. Panoramic, 4 bitewings and 4 periapicals made and reviewed. 2. Clinical exam completed. Periodontal charting completed. 3. Pt shows no signs of active or immediate risk of acute dental infections. 4. Recommended to pt to follow up with dentist after surgery for a proper dental cleaning. 5. Pt is CLEARED from a dental perspective for heart surgery.     Electronically signed by Nilda Moody DDS on 6/23/21 at 9:17 AM EDT

## 2021-06-23 NOTE — PROGRESS NOTES
Messaged cardiology regarding possible discharge today if okay with them and if life vest is needed. Dr. Juani Cruz wants to see patient first. Patient notified needs seen by cardiology prior to discharge.

## 2021-06-23 NOTE — CARE COORDINATION
Discharge order on chart. Pt to return for surgery at later date. Nursing spoke with cardio and no life vest ordered. Per Public Benefits - pt is not eligible for help with meds at [place]. [facility] outpatient pharmacy notified voicemail on 06/18/21.

## 2021-06-23 NOTE — PROGRESS NOTES
CC: leg swelling    Brief HPI:  Patient seen with Dr. Murphy Bennett. Awake, alert. No complaints. Past Medical History:   Diagnosis Date    Hypertension      No past surgical history on file. Social History     Socioeconomic History    Marital status:      Spouse name: Not on file    Number of children: Not on file    Years of education: Not on file    Highest education level: Not on file   Occupational History    Not on file   Tobacco Use    Smoking status: Former Smoker     Packs/day: 1.50     Types: Cigarettes     Start date: 6/15/1973     Quit date: 6/15/2001     Years since quittin.0    Smokeless tobacco: Never Used   Vaping Use    Vaping Use: Never used   Substance and Sexual Activity    Alcohol use: Yes     Comment: occasional    Drug use: No    Sexual activity: Not on file   Other Topics Concern    Not on file   Social History Narrative    Not on file     Social Determinants of Health     Financial Resource Strain:     Difficulty of Paying Living Expenses:    Food Insecurity:     Worried About Running Out of Food in the Last Year:     [address] in the Last Year:    Transportation Needs:     Lack of Transportation (Medical):      Lack of Transportation (Non-Medical):    Physical Activity:     Days of Exercise per Week:     Minutes of Exercise per Session:    Stress:     Feeling of Stress :    Social Connections:     Frequency of Communication with Friends and Family:     Frequency of Social Gatherings with Friends and Family:     Attends [employer]oration[employer] Services:     Active Member of Clubs or Organizations:     Attends Club or Organization Meetings:     Marital Status:    Intimate Partner Violence:     Fear of Current or Ex-Partner:     Emotionally Abused:     Physically Abused:     Sexually Abused:      Family History   Problem Relation Age of Onset    Other Mother         esophageal tumor    Heart Disease Father     Breast Cancer Sister     No Known Problems Brother     No Known Problems Brother     No Known Problems Sister        Vitals:    06/22/21 2343 06/23/21 0419 06/23/21 0648 06/23/21 0651   BP: (!) 120/91 116/76 (!) 118/92    Pulse: 78 75 77    Resp: 16 16 18    Temp: 97.7 °F (36.5 °C) 97.6 °F (36.4 °C) 98.2 °F (36.8 °C)    TempSrc: Oral Oral Temporal    SpO2: 96% 95% 96%    Weight:    261 lb 9.6 oz (118.7 kg)   Height:               Intake/Output Summary (Last 24 hours) at 6/23/2021 1055  Last data filed at 6/23/2021 0859  Gross per 24 hour   Intake 600 ml   Output 2950 ml   Net -2350 ml         Recent Labs     06/21/21  0703   WBC 8.3   HGB 16.9*   HCT 53.4         Recent Labs     06/21/21  0703 06/22/21  0513   BUN 20 16   CREATININE 0.9 0.9         ROS:   Negative for CP, palpitations, SOB at rest, dizziness/lightheadedness. Physical Exam   Constitutional: Oriented to person, place, and time. Appears well-developed. No distress. Cardiovascular: Normal rate, regular rhythm and normal heart sounds. Pulmonary/Chest: Effort normal. No respiratory distress. Abdominal: Soft. Bowel sounds are normal.   Musculoskeletal: Normal range of motion. Neurological: alert and oriented to person, place, and time. Skin: Skin is warm and dry. Psychiatric: normal mood and affect.          A/P: AS, CAD  - pt would like to go home and get his stuff in order prior to surgery  - OK for discharge from CTS  - Will see him in the office on Tuesday to discuss plan         Pre-operative testing review:   --carotids with no significant stenosis  --cari with good flow bilaterally  --ct chest reviewed  --TTE with EF 15-20%, severe global hypokinesis, severe low flow, low gradient aortic stenosis, moderate pulm HTN  --pft with FEV1 86 percent of predicted and DLCO 40 percent of predicted  --hgA1c 8.1        Note: 25 minutes was spent providing face-to-face patient care, including:  and coordinating care, reviewing the chart, labs, and diagnostics, as well as medical decision making. Greater than 50% of this time was spent instructing and counseling the patient face to face regarding findings and recommendations.

## 2021-06-23 NOTE — PROGRESS NOTES
CLINICAL PHARMACY NOTE: MEDS TO BEDS    Total # of Prescriptions Filled: 3   The following medications were delivered to the patient:  · Furosemide 40 mg  · Spironolactone 25 mg  · Atorvastatin 40 mg     Additional Documentation:  Delivered meds to patient @2:13pm

## 2021-06-23 NOTE — PROGRESS NOTES
Okay to discharge per Dr. Chantal Cohen and no life vest needed at discharge. Dr. Kelsey Negro notified and update.

## 2021-06-24 LAB — URINE CULTURE, ROUTINE: NORMAL

## 2021-06-28 ENCOUNTER — TELEPHONE (OUTPATIENT)
Dept: CARDIOTHORACIC SURGERY | Age: 61
End: 2021-06-28

## 2021-06-28 ASSESSMENT — ENCOUNTER SYMPTOMS
ABDOMINAL PAIN: 0
SHORTNESS OF BREATH: 1

## 2021-06-28 NOTE — TELEPHONE ENCOUNTER
Pt has ov tomorrow. He is suppose to go to court in the afternoon for divorce hearing. Originally it was to be virtual. Can we right him a note excusing him due to his health? He will discuss this at [facility] tomorrow with the  Just giving heads up.

## 2021-06-28 NOTE — PROGRESS NOTES
Subjective:      Patient ID: Jeffery Hurley is a 63 y.o. male. HPI     Mr. Tank Caicedo is a 65-year-old male presenting to the office to continue discussion regarding possible surgical intervention. His past medical history includes hypertension. He was initially seen on 6/22/2021 upon consultation from cardiology during a recent hospital admission for cardiomyopathy after complaints of progressively worsening leg swelling and shortness of breath. He was found to be in CHF and was diuresed. He was found to have severe AS and heart cath was done showing CAD. Currently he denies CP, SOB, HAMILTON, increasing LE edema, N/V, F/C, orthopnea, PND and syncope. Dental clearance has been obtained on 6/23/21. Review of Systems   Constitutional: Negative for fatigue and unexpected weight change. Respiratory: Positive for shortness of breath. Cardiovascular: Positive for leg swelling. Negative for chest pain. Gastrointestinal: Negative for abdominal pain. Neurological: Negative for dizziness, syncope and light-headedness. Objective:   Physical Exam  Constitutional:       General: He is not in acute distress. Cardiovascular:      Rate and Rhythm: Normal rate. Heart sounds: Murmur heard. Pulmonary:      Effort: Pulmonary effort is normal. No respiratory distress. Abdominal:      General: Bowel sounds are normal.   Musculoskeletal:         General: Swelling present. Normal range of motion. Skin:     General: Skin is warm and dry. Neurological:      Mental Status: He is alert and oriented to person, place, and time. Psychiatric:         Mood and Affect: Mood normal.         Behavior: Behavior normal.         Assessment:      AS, ICM, CAD, CHF      Plan:      D/W Dr. Sherley Solorzano and we feel he is best served with surgical repair with grafts to LAD, Diag, and AVR with likely impella back up.   I would like him to have a GABRIELE (he is to see Dr. Meg Gee Friday) and a cardiac MRI then I will see him back to be scheduled.

## 2021-06-29 ENCOUNTER — INITIAL CONSULT (OUTPATIENT)
Dept: CARDIOTHORACIC SURGERY | Age: 61
End: 2021-06-29
Payer: COMMERCIAL

## 2021-06-29 VITALS
HEART RATE: 98 BPM | DIASTOLIC BLOOD PRESSURE: 101 MMHG | HEIGHT: 75 IN | SYSTOLIC BLOOD PRESSURE: 140 MMHG | BODY MASS INDEX: 32.83 KG/M2 | WEIGHT: 264 LBS

## 2021-06-29 DIAGNOSIS — I50.21 ACUTE SYSTOLIC HEART FAILURE (HCC): Primary | ICD-10-CM

## 2021-06-29 DIAGNOSIS — I35.0 SEVERE AORTIC STENOSIS: ICD-10-CM

## 2021-06-29 DIAGNOSIS — I50.21 ACUTE SYSTOLIC CONGESTIVE HEART FAILURE (HCC): Primary | ICD-10-CM

## 2021-06-29 DIAGNOSIS — I50.21 ACUTE SYSTOLIC HEART FAILURE (HCC): ICD-10-CM

## 2021-06-29 DIAGNOSIS — I25.119 CORONARY ARTERY DISEASE INVOLVING NATIVE CORONARY ARTERY OF NATIVE HEART WITH ANGINA PECTORIS (HCC): ICD-10-CM

## 2021-06-29 PROCEDURE — 99215 OFFICE O/P EST HI 40 MIN: CPT | Performed by: THORACIC SURGERY (CARDIOTHORACIC VASCULAR SURGERY)

## 2021-06-29 NOTE — LETTER
[address] [facility]  [address] [address] [facility] [address] [place] [address]  Phone: [phone]  Fax: [phone]    Jaylan Muller MD    June 29, 2021     Boris Kaur III, DO  [address] [place] [id number]    Patient: Anatoliy Cao   MR Number: [id number]   YOB: 1960   Date of Visit: 6/29/2021       Dear Boris Kaur III:    Thank you for referring Sachi Durant to me for evaluation/treatment. Below are the relevant portions of my assessment and plan of care. Vitals:    06/29/21 0947   BP: (!) 140/101   Pulse: 98   Weight: 264 lb (119.7 kg)   Height: 6' 3\" (1.905 m)       Subjective:      Patient ID: Anatoliy Cao is a 63 y.o. male. HPI     Mr. Xu Kelley is a 78-year-old male presenting to the office to continue discussion regarding possible surgical intervention. His past medical history includes hypertension. He was initially seen on 6/22/2021 upon consultation from cardiology during a recent hospital admission for cardiomyopathy after complaints of progressively worsening leg swelling and shortness of breath. He was found to be in CHF and was diuresed. He was found to have severe AS and heart cath was done showing CAD. Currently he denies CP, SOB, HAMILTON, increasing LE edema, N/V, F/C, orthopnea, PND and syncope. Dental clearance has been obtained on 6/23/21. Review of Systems   Constitutional: Negative for fatigue and unexpected weight change. Respiratory: Positive for shortness of breath. Cardiovascular: Positive for leg swelling. Negative for chest pain. Gastrointestinal: Negative for abdominal pain. Neurological: Negative for dizziness, syncope and light-headedness. Objective:   Physical Exam  Constitutional:       General: He is not in acute distress. Cardiovascular:      Rate and Rhythm: Normal rate. Heart sounds: Murmur heard.      Pulmonary:      Effort: Pulmonary effort is normal. No respiratory distress. Abdominal:      General: Bowel sounds are normal.   Musculoskeletal:         General: Swelling present. Normal range of motion. Skin:     General: Skin is warm and dry. Neurological:      Mental Status: He is alert and oriented to person, place, and time. Psychiatric:         Mood and Affect: Mood normal.         Behavior: Behavior normal.         Assessment:      AS, ICM, CAD, CHF      Plan:      D/W Dr. Marj Fox and we feel he is best served with surgical repair with grafts to LAD, Diag, and AVR with likely impella back up. I would like him to have a GABRIELE (he is to see Dr. Beatriz Sy Friday) and a cardiac MRI then I will see him back to be scheduled. If you have questions, please do not hesitate to call me. I look forward to following Mary Grace Herron along with you.     Sincerely,    Cm Lindsey MD

## 2021-06-30 ENCOUNTER — TELEPHONE (OUTPATIENT)
Dept: CARDIOTHORACIC SURGERY | Age: 61
End: 2021-06-30

## 2021-06-30 DIAGNOSIS — Z01.818 PRE-OP TESTING: Primary | ICD-10-CM

## 2021-07-01 ENCOUNTER — TELEPHONE (OUTPATIENT)
Dept: CARDIOLOGY CLINIC | Age: 61
End: 2021-07-01

## 2021-07-01 NOTE — TELEPHONE ENCOUNTER
Spoke with pt and he said he already had a similar appointment and doesn't see the need for this one.

## 2021-07-07 DIAGNOSIS — Z01.818 PRE-OP TESTING: ICD-10-CM

## 2021-07-08 ENCOUNTER — TELEPHONE (OUTPATIENT)
Dept: NON INVASIVE DIAGNOSTICS | Age: 61
End: 2021-07-08

## 2021-07-09 ENCOUNTER — TELEPHONE (OUTPATIENT)
Dept: CARDIOTHORACIC SURGERY | Age: 61
End: 2021-07-09

## 2021-07-09 LAB
SARS-COV-2: NOT DETECTED
SOURCE: NORMAL

## 2021-07-09 NOTE — TELEPHONE ENCOUNTER
Pt dropped off disability papers. He wants them filled out by [facility] since he has been out of work a month already. However he only saw dr in consult and isn't even scheduled for surgery yet. No notes by  saying he is to be off work already. He stated dr wrote him a letter saying his condition was too weak to work, however that was for court which was scheduled same day as OV. Not meant for him to be off work. Please advise should he have cardiology fill out? Is he even suppose to be off work?

## 2021-07-12 ENCOUNTER — ANESTHESIA (OUTPATIENT)
Dept: CARDIAC CATH/INVASIVE PROCEDURES | Age: 61
End: 2021-07-12

## 2021-07-12 ENCOUNTER — ANESTHESIA EVENT (OUTPATIENT)
Dept: CARDIAC CATH/INVASIVE PROCEDURES | Age: 61
End: 2021-07-12

## 2021-07-12 ENCOUNTER — HOSPITAL ENCOUNTER (OUTPATIENT)
Dept: CARDIAC CATH/INVASIVE PROCEDURES | Age: 61
Discharge: HOME | End: 2021-07-12
Payer: COMMERCIAL

## 2021-07-12 VITALS
OXYGEN SATURATION: 94 % | SYSTOLIC BLOOD PRESSURE: 106 MMHG | WEIGHT: 268 LBS | BODY MASS INDEX: 33.32 KG/M2 | DIASTOLIC BLOOD PRESSURE: 72 MMHG | HEIGHT: 75 IN | RESPIRATION RATE: 24 BRPM | TEMPERATURE: 98.1 F | HEART RATE: 78 BPM

## 2021-07-12 VITALS
SYSTOLIC BLOOD PRESSURE: 91 MMHG | OXYGEN SATURATION: 89 % | DIASTOLIC BLOOD PRESSURE: 58 MMHG | RESPIRATION RATE: 25 BRPM

## 2021-07-12 LAB
LV EF: 23 %
LVEF MODALITY: NORMAL

## 2021-07-12 PROCEDURE — 2580000003 HC RX 258: Performed by: NURSE ANESTHETIST, CERTIFIED REGISTERED

## 2021-07-12 PROCEDURE — 93325 DOPPLER ECHO COLOR FLOW MAPG: CPT

## 2021-07-12 PROCEDURE — 93312 ECHO TRANSESOPHAGEAL: CPT

## 2021-07-12 PROCEDURE — 3700000000 HC ANESTHESIA ATTENDED CARE

## 2021-07-12 PROCEDURE — 93325 DOPPLER ECHO COLOR FLOW MAPG: CPT | Performed by: INTERNAL MEDICINE

## 2021-07-12 PROCEDURE — 6360000002 HC RX W HCPCS: Performed by: NURSE ANESTHETIST, CERTIFIED REGISTERED

## 2021-07-12 PROCEDURE — 2580000003 HC RX 258: Performed by: INTERNAL MEDICINE

## 2021-07-12 PROCEDURE — 93321 DOPPLER ECHO F-UP/LMTD STD: CPT

## 2021-07-12 PROCEDURE — 3700000001 HC ADD 15 MINUTES (ANESTHESIA)

## 2021-07-12 PROCEDURE — 2500000003 HC RX 250 WO HCPCS: Performed by: NURSE ANESTHETIST, CERTIFIED REGISTERED

## 2021-07-12 PROCEDURE — 93312 ECHO TRANSESOPHAGEAL: CPT | Performed by: INTERNAL MEDICINE

## 2021-07-12 PROCEDURE — 93320 DOPPLER ECHO COMPLETE: CPT | Performed by: INTERNAL MEDICINE

## 2021-07-12 PROCEDURE — 2709999900 HC NON-CHARGEABLE SUPPLY

## 2021-07-12 PROCEDURE — 93308 TTE F-UP OR LMTD: CPT

## 2021-07-12 RX ORDER — ETOMIDATE 2 MG/ML
INJECTION INTRAVENOUS PRN
Status: DISCONTINUED | OUTPATIENT
Start: 2021-07-12 | End: 2021-07-12 | Stop reason: SDUPTHER

## 2021-07-12 RX ORDER — SODIUM CHLORIDE 9 MG/ML
INJECTION, SOLUTION INTRAVENOUS ONCE
Status: COMPLETED | OUTPATIENT
Start: 2021-07-12 | End: 2021-07-12

## 2021-07-12 RX ORDER — PROPOFOL 10 MG/ML
INJECTION, EMULSION INTRAVENOUS PRN
Status: DISCONTINUED | OUTPATIENT
Start: 2021-07-12 | End: 2021-07-12 | Stop reason: SDUPTHER

## 2021-07-12 RX ORDER — SODIUM CHLORIDE 9 MG/ML
INJECTION, SOLUTION INTRAVENOUS CONTINUOUS PRN
Status: DISCONTINUED | OUTPATIENT
Start: 2021-07-12 | End: 2021-07-12 | Stop reason: SDUPTHER

## 2021-07-12 RX ADMIN — ETOMIDATE 4 MG: 2 INJECTION, SOLUTION INTRAVENOUS at 08:50

## 2021-07-12 RX ADMIN — PHENYLEPHRINE HYDROCHLORIDE 100 MCG: 10 INJECTION INTRAVENOUS at 09:09

## 2021-07-12 RX ADMIN — SODIUM CHLORIDE: 9 INJECTION, SOLUTION INTRAVENOUS at 07:59

## 2021-07-12 RX ADMIN — PHENYLEPHRINE HYDROCHLORIDE 200 MCG: 10 INJECTION INTRAVENOUS at 09:20

## 2021-07-12 RX ADMIN — ETOMIDATE 4 MG: 2 INJECTION, SOLUTION INTRAVENOUS at 09:17

## 2021-07-12 RX ADMIN — PROPOFOL 200 MG: 10 INJECTION, EMULSION INTRAVENOUS at 08:45

## 2021-07-12 RX ADMIN — SODIUM CHLORIDE: 9 INJECTION, SOLUTION INTRAVENOUS at 08:42

## 2021-07-12 RX ADMIN — ETOMIDATE 4 MG: 2 INJECTION, SOLUTION INTRAVENOUS at 08:45

## 2021-07-12 RX ADMIN — PHENYLEPHRINE HYDROCHLORIDE 200 MCG: 10 INJECTION INTRAVENOUS at 09:15

## 2021-07-12 RX ADMIN — ETOMIDATE 4 MG: 2 INJECTION, SOLUTION INTRAVENOUS at 09:08

## 2021-07-12 NOTE — H&P
CC: Multivessel CAD, aortic stenosis, LV dysfunction    Consult from 6/17/2021 reviewed. Subsequent hospital course noted. Cath showed multivessel disease. Dobutamine stress echo confirming severe low-flow low gradient AAS.     Plan: GABRIELE

## 2021-07-12 NOTE — ANESTHESIA POSTPROCEDURE EVALUATION
Department of Anesthesiology  Postprocedure Note    Patient: Millicent Pepe  MRN: [id number]  YOB: 1960  Date of evaluation: 7/12/2021  Time:  12:28 PM     Procedure Summary     Date: 07/12/21 Room / Location: [facility] CATH LAB; [facility] ECHO    Anesthesia Start: 0408 Anesthesia Stop: 9625    Procedure: SEY GABRIELE Diagnosis: Nonrheumatic aortic (valve) stenosis    Scheduled Providers:  Responsible Provider: Garrison Daly MD    Anesthesia Type: MAC ASA Status: 4          Anesthesia Type: MAC    Geovanna Phase I:      Geovanna Phase II:      Last vitals: Reviewed and per EMR flowsheets.        Anesthesia Post Evaluation    Patient location during evaluation: PACU  Patient participation: complete - patient participated  Level of consciousness: awake  Pain score: 0  Airway patency: patent  Nausea & Vomiting: no nausea  Complications: no  Cardiovascular status: hemodynamically stable  Respiratory status: acceptable  Hydration status: stable

## 2021-07-12 NOTE — PROCEDURES
PRELIMINARY TRANSESOPHAGEAL ECHOCARDIOGRAPHY REPORT    Date of Procedure: 7/12/2021    Indication: Aortic stenosis, multivessel CAD, LV dysfunction    Sedation: Propofol, etomidate    Complications: None    Preliminary findings:  Severe LV dysfunction  Calcified bicuspid aortic valve with fusion of the right and left coronary cusps  Probably severe LFLG aortic stenosis  Peak velocity 3.4 m/s. Mean gradient 30 mmHg  YESI 0.8 cm2.   DI 0.18.    Aortic valve area 1.2 cm² by planimetry  Aortic valve area 0.9 cm² by continuity    Full report to follow    Kristen Schaefer MD, [facility] Cardiology

## 2021-07-12 NOTE — ANESTHESIA PRE PROCEDURE
skin nightly      aspirin 81 MG EC tablet Take 81 mg by mouth daily      glyBURIDE (DIABETA) 5 MG tablet Take 5 mg by mouth 2 times daily       lisinopril (PRINIVIL;ZESTRIL) 20 MG tablet Take 20 mg by mouth 2 times daily       spironolactone (ALDACTONE) 25 MG tablet Take 1 tablet by mouth daily 30 tablet 3    metoprolol succinate (TOPROL XL) 50 MG extended release tablet Take 1 tablet by mouth daily 30 tablet 3    metoprolol succinate (TOPROL XL) 25 MG extended release tablet Take 1 tablet by mouth daily (Patient taking differently: Take 25 mg by mouth daily Patient stated never started this medication) 30 tablet 3     Current Facility-Administered Medications   Medication Dose Route Frequency Provider Last Rate Last Admin    0.9 % sodium chloride infusion   Intravenous Once Yana Tan MD           Allergies:  No Known Allergies    Problem List:    Patient Active Problem List   Diagnosis Code    Acute systolic congestive heart failure (HCC) I50.21    Class 2 severe obesity due to excess calories with serious comorbidity in adult [facility]) E66.01    Uncontrolled type 2 diabetes mellitus with hyperglycemia ([facility]) E11.65    Essential hypertension I10    Severe aortic stenosis E49.2    Acute systolic CHF (congestive heart failure) ([facility]) I50.21    Heart failure (HCC) I50.9       Past Medical History:        Diagnosis Date    CHF (congestive heart failure) ([facility])     Diabetes mellitus ([facility] [address]     Hypertension        Past Surgical History:        Procedure Laterality Date    CARDIAC CATHETERIZATION      HERNIA REPAIR         Social History:    Social History     Tobacco Use    Smoking status: Former Smoker     Packs/day: 1.50     Types: Cigarettes     Start date: 6/15/1973     Quit date: 6/15/2001     Years since quittin.0    Smokeless tobacco: Never Used   Substance Use Topics    Alcohol use: Yes     Comment: occasional                                Counseling given: Not Answered      Vital Signs (Current):   Vitals:    07/12/21 0737   BP: 129/88   Pulse: 89   Temp: 36.7 °C (98.1 °F)   SpO2: 96%   Weight: 268 lb (121.6 kg)   Height: 6' 3\" (1.905 m)                                              BP Readings from Last 3 Encounters:   07/12/21 129/88   06/29/21 (!) 140/101   06/23/21 131/70       NPO Status:  >8 hrs                                                                               BMI:   Wt Readings from Last 3 Encounters:   07/12/21 268 lb (121.6 kg)   06/29/21 264 lb (119.7 kg)   06/23/21 261 lb 9.6 oz (118.7 kg)     Body mass index is 33.5 kg/m². CBC:   Lab Results   Component Value Date    WBC 8.3 06/21/2021    RBC 5.54 06/21/2021    HGB 16.9 06/21/2021    HCT 53.4 06/21/2021    MCV 96.4 06/21/2021    RDW 13.8 06/21/2021     06/21/2021       CMP:   Lab Results   Component Value Date     06/22/2021    K 3.9 06/22/2021    K 4.0 06/16/2021    CL 96 06/22/2021    CO2 27 06/22/2021    BUN 16 06/22/2021    CREATININE 0.9 06/22/2021    GFRAA >60 06/22/2021    LABGLOM >60 06/22/2021    GLUCOSE 283 06/22/2021    GLUCOSE 259 04/13/2012    PROT 6.7 06/15/2021    CALCIUM 9.5 06/22/2021    BILITOT 1.0 06/15/2021    ALKPHOS 91 06/15/2021    AST 23 06/15/2021    ALT 32 06/15/2021       POC Tests: No results for input(s): POCGLU, POCNA, POCK, POCCL, POCBUN, POCHEMO, POCHCT in the last 72 hours.     Coags: No results found for: PROTIME, INR, APTT    HCG (If Applicable): No results found for: PREGTESTUR, PREGSERUM, HCG, HCGQUANT     ABGs: No results found for: PHART, PO2ART, EQP0JQH, REM6UHZ, BEART, U6DIBZKX     Type & Screen (If Applicable):  No results found for: LABABO, LABRH    Drug/Infectious Status (If Applicable):  No results found for: HIV, HEPCAB    COVID-19 Screening (If Applicable):   Lab Results   Component Value Date    COVID19 Not Detected 07/07/2021       EKG 12 Lead  Order: [id number]  Status:  Final result   Visible to patient:  No (not released) Next appt:  07/14/2021 at 09:15 AM in IP Unit [facility] CHF ROOM 1)   0 Result Notes   Ref Range & Units 6/17/21 0817   Ventricular Rate     Atrial Rate     P-R Interval ms 186    QRS Duration ms 132    Q-T Interval ms 396    QTc Calculation (Bazett) ms 510    P Axis degrees 51    R Axis degrees -54    T Axis degrees 97    Resulting Agency  [facility]      Narrative & Impression    Normal sinus rhythm with sinus arrhythmia and frequent PAC's  Left axis deviation  Left ventricular hypertrophy with QRS widening and repolarization abnormality  Cannot rule out anterior MI, age undetermined  Abnormal ECG  No previous ECGs available  Confirmed by Virginia Good ([id number]) on 6/17/2021 9:39:12 AM           ECHO 6/15/21  Summary   Left ventricle is moderately enlarged . Ejection fraction is visually estimated at 15-20%. Overall ejection fraction severely decreased . Severe global hypokinesis. Normal left ventricle wall thickness. Stage III diastolic dysfunction. Moderately dilated right ventricle. Right ventricle global systolic function is low normal . TAPSE 17 mm. There is severe low flow, low gradient aortic stenosis with valve area of   0.8 sq cm by continuity equation. Aortic valve peak velocity 2.8 m/s, mean   gradient 19 mmHg, DVI 0.22, SVI 15 ml/m2. Mild tricuspid regurgitation. RVSP is 58 mmHg. Pulmonary hypertension is moderate . No evidence for hemodynamically significant pericardial effusion.     Anesthesia Evaluation  Patient summary reviewed and Nursing notes reviewed no history of anesthetic complications:   Airway: Mallampati: II  TM distance: <3 FB   Neck ROM: full   Dental:          Pulmonary: breath sounds clear to auscultation                             Cardiovascular:    (+) hypertension:, valvular problems/murmurs: AS, CHF: systolic, hyperlipidemia      ECG reviewed  Rhythm: regular  Rate: normal           Beta Blocker:  Dose within 24 Hrs         Neuro/Psych:               GI/Hepatic/Renal:   (+) morbid obesity          Endo/Other:    (+) DiabetesType II DM, poorly controlled, , .                 Abdominal:             Vascular: Other Findings:           Anesthesia Plan      MAC     ASA 4       Induction: intravenous. Anesthetic plan and risks discussed with patient. Plan discussed with attending.                   SHIRA Rhodes - BARBI   7/12/2021

## 2021-07-13 ENCOUNTER — TELEPHONE (OUTPATIENT)
Dept: CARDIOLOGY CLINIC | Age: 61
End: 2021-07-13

## 2021-07-13 NOTE — TELEPHONE ENCOUNTER
Explained to pt Dr Carleen Elliott never told him he couldn't work. Therefore we cant fill out paperwork. We only fill out after surgery. If he believes he was told to stay off work he was informed to contact cardiology.

## 2021-07-21 ENCOUNTER — TELEPHONE (OUTPATIENT)
Dept: CARDIOTHORACIC SURGERY | Age: 61
End: 2021-07-21

## 2021-07-21 ENCOUNTER — TELEPHONE (OUTPATIENT)
Dept: CARDIOLOGY CLINIC | Age: 61
End: 2021-07-21

## 2021-07-21 ENCOUNTER — HOSPITAL ENCOUNTER (OUTPATIENT)
Dept: MRI IMAGING | Age: 61
Discharge: HOME | End: 2021-07-23
Payer: COMMERCIAL

## 2021-07-21 DIAGNOSIS — I50.21 ACUTE SYSTOLIC CONGESTIVE HEART FAILURE (HCC): ICD-10-CM

## 2021-07-21 DIAGNOSIS — I50.22 CHRONIC SYSTOLIC CONGESTIVE HEART FAILURE (HCC): Primary | ICD-10-CM

## 2021-07-21 DIAGNOSIS — I35.0 SEVERE AORTIC STENOSIS: ICD-10-CM

## 2021-07-21 DIAGNOSIS — I50.21 ACUTE SYSTOLIC HEART FAILURE (HCC): ICD-10-CM

## 2021-07-21 NOTE — TELEPHONE ENCOUNTER
Patient calling stating he has gained 8 pounds over the past five days. Patient has increased shortness of breath. He currently takes furosemide 40 mg twice daily. Please advise. Increase furosemide to 40 bid for 3 days. Any swelling? Check BMP and proBNP today    Patient also states he could not tolerate the MRI (too tight a fit). Patient wondering if it is absolutely necessary for him to have MRI or if there is another test that could be done in place of it. Please advise. Dr Tarun Walsh ordered, ask him    Patient also asking about disability paperwork he dropped off. States he is not working and has no money coming in and needs them completed. (These documents are scanned into Media for your review)   Please advise. I will be happy to provide my office notes and test results to support his disability claim but I will defer filling out of his and all disability forms to his PCP.

## 2021-07-21 NOTE — TELEPHONE ENCOUNTER
Contacted patient. He indicates he has some mild ankle edema, but this is a constant issue for him. He will present to [facility] for lab work today. Patient will contact Dr. Neil Kaur office regarding the MRI issue he has. Office notes and testing have been printed out and patient will pick them up for presentation to PCP for disability paperwork. Lab order in EPIC.

## 2021-07-21 NOTE — PROGRESS NOTES
Unable to perform mri. The patient is too large for our scanner. He was instructed to contact his ordering physician to see where he should go instead. A form was also faxed to Dr [facility] office stating this as well.

## 2021-07-27 ENCOUNTER — HOSPITAL ENCOUNTER (OUTPATIENT)
Dept: OTHER | Age: 61
Setting detail: THERAPIES SERIES
Discharge: HOME | End: 2021-07-27
Payer: COMMERCIAL

## 2021-07-27 VITALS
RESPIRATION RATE: 20 BRPM | HEART RATE: 81 BPM | BODY MASS INDEX: 35.37 KG/M2 | SYSTOLIC BLOOD PRESSURE: 133 MMHG | OXYGEN SATURATION: 97 % | WEIGHT: 283 LBS | DIASTOLIC BLOOD PRESSURE: 82 MMHG

## 2021-07-27 LAB
ANION GAP SERPL CALCULATED.3IONS-SCNC: 10 MMOL/L (ref 7–16)
BUN BLDV-MCNC: 20 MG/DL (ref 6–23)
CALCIUM SERPL-MCNC: 9.2 MG/DL (ref 8.6–10.2)
CHLORIDE BLD-SCNC: 103 MMOL/L (ref 98–107)
CO2: 26 MMOL/L (ref 22–29)
CREAT SERPL-MCNC: 1.3 MG/DL (ref 0.7–1.2)
GFR AFRICAN AMERICAN: >60
GFR NON-AFRICAN AMERICAN: 56 ML/MIN/1.73
GLUCOSE BLD-MCNC: 235 MG/DL (ref 74–99)
POTASSIUM SERPL-SCNC: 3.9 MMOL/L (ref 3.5–5)
PRO-BNP: 2994 PG/ML (ref 0–125)
SODIUM BLD-SCNC: 139 MMOL/L (ref 132–146)

## 2021-07-27 PROCEDURE — 96374 THER/PROPH/DIAG INJ IV PUSH: CPT

## 2021-07-27 PROCEDURE — 6360000002 HC RX W HCPCS: Performed by: INTERNAL MEDICINE

## 2021-07-27 PROCEDURE — 80048 BASIC METABOLIC PNL TOTAL CA: CPT

## 2021-07-27 PROCEDURE — 36415 COLL VENOUS BLD VENIPUNCTURE: CPT

## 2021-07-27 PROCEDURE — 83880 ASSAY OF NATRIURETIC PEPTIDE: CPT

## 2021-07-27 PROCEDURE — 99204 OFFICE O/P NEW MOD 45 MIN: CPT

## 2021-07-27 PROCEDURE — 2580000003 HC RX 258: Performed by: INTERNAL MEDICINE

## 2021-07-27 RX ORDER — SODIUM CHLORIDE 0.9 % (FLUSH) 0.9 %
10 SYRINGE (ML) INJECTION 2 TIMES DAILY
Status: DISCONTINUED | OUTPATIENT
Start: 2021-07-27 | End: 2021-07-28 | Stop reason: HOSPADM

## 2021-07-27 RX ORDER — FUROSEMIDE 10 MG/ML
40 INJECTION INTRAMUSCULAR; INTRAVENOUS ONCE
Status: COMPLETED | OUTPATIENT
Start: 2021-07-27 | End: 2021-07-27

## 2021-07-27 RX ADMIN — Medication 10 ML: at 10:48

## 2021-07-27 RX ADMIN — FUROSEMIDE 40 MG: 10 INJECTION, SOLUTION INTRAMUSCULAR; INTRAVENOUS at 10:48

## 2021-07-27 NOTE — PROGRESS NOTES
Active  RLQ Bowel Sounds: Active  LLQ Bowel Sounds: Active    Peripheral Vascular  Peripheral Vascular (WDL): Exceptions to WDL  Edema: Right lower extremity, Left lower extremity  RLE Edema: +1, Pitting  LLE Edema: +1, Pitting    Skin Color/Condition  Skin Color: Appropriate for ethnicity  Skin Condition/Temp: Warm, Swollen (VASCULAR DISCOLORATION BLE)                                            Pulse: 81                   LAB DATA:    BNP  No results for input(s): BNP in the last 72 hours. proBNP  No results for input(s): PROBNP in the last 72 hours. BMP  No results for input(s): NA, K, CL, CO2, BUN, CREATININE, GLUCOSE, CALCIUM in the last 72 hours. WEIGHTS:  Wt Readings from Last 3 Encounters:   07/27/21 283 lb (128.4 kg)   07/12/21 268 lb (121.6 kg)   06/29/21 264 lb (119.7 kg)         TELEMETRY:  Cardiac Regularity: Regular  Cardiac Rhythm/Interpretation: NSR        ASSESSMENT:  [place]se is hypervolemic with weight gain     Interventions completed this visit:  IV diuretics given yes, LASIX 40 MG IV  Lab work obtained yes, BNP/BMP   Reviewed continue current medications that patient as prescribed answered any questions   Educated on signs and symptoms of HF  Educated on low sodium diet    PLAN:  Scheduled to follow up in CHF clinic on august 11, 2021. Given clinic phone number and aware of signs and symptoms to call with any HF change in symptoms. First VISIT TODAY, CHF TEACHING GIVEN, CARING FOR YOUR HEART, ZONE PAMPHLET, LOW SODIUM DIET, DAILY WEIGHTS MEDICATION REVIEWED, PT VERBALIZED UNDERSTANDING.

## 2021-07-28 ENCOUNTER — TELEPHONE (OUTPATIENT)
Dept: CARDIOLOGY CLINIC | Age: 61
End: 2021-07-28

## 2021-07-28 DIAGNOSIS — I50.22 CHRONIC SYSTOLIC CONGESTIVE HEART FAILURE (HCC): Primary | ICD-10-CM

## 2021-07-28 NOTE — TELEPHONE ENCOUNTER
Contacted patient with lab results and recommendations per Dr. Everton Glez. Patient verbalized understanding.    ----- Message from Zeferino Pardo MD sent at 7/28/2021  9:45 AM EDT -----  ProBNP up. If still SOB would have him continue lasix 40 twice daily.   Creatinine was up so will need repeat blood work in 1-2 weeks or next CHF clinic visit

## 2021-08-03 ENCOUNTER — TELEPHONE (OUTPATIENT)
Dept: CARDIOTHORACIC SURGERY | Age: 61
End: 2021-08-03

## 2021-08-03 ENCOUNTER — PREP FOR PROCEDURE (OUTPATIENT)
Dept: CARDIOTHORACIC SURGERY | Age: 61
End: 2021-08-03

## 2021-08-03 ENCOUNTER — OFFICE VISIT (OUTPATIENT)
Dept: CARDIOTHORACIC SURGERY | Age: 61
End: 2021-08-03
Payer: COMMERCIAL

## 2021-08-03 VITALS
HEIGHT: 75 IN | HEART RATE: 102 BPM | DIASTOLIC BLOOD PRESSURE: 94 MMHG | WEIGHT: 283 LBS | OXYGEN SATURATION: 95 % | SYSTOLIC BLOOD PRESSURE: 132 MMHG | BODY MASS INDEX: 35.19 KG/M2

## 2021-08-03 DIAGNOSIS — I50.21 ACUTE SYSTOLIC HEART FAILURE (HCC): ICD-10-CM

## 2021-08-03 DIAGNOSIS — R07.9 CHEST PAIN, UNSPECIFIED TYPE: Primary | ICD-10-CM

## 2021-08-03 DIAGNOSIS — I25.119 CORONARY ARTERY DISEASE INVOLVING NATIVE CORONARY ARTERY OF NATIVE HEART WITH ANGINA PECTORIS (HCC): ICD-10-CM

## 2021-08-03 DIAGNOSIS — I35.0 SEVERE AORTIC STENOSIS: Primary | ICD-10-CM

## 2021-08-03 PROCEDURE — 3017F COLORECTAL CA SCREEN DOC REV: CPT | Performed by: THORACIC SURGERY (CARDIOTHORACIC VASCULAR SURGERY)

## 2021-08-03 PROCEDURE — 99215 OFFICE O/P EST HI 40 MIN: CPT | Performed by: THORACIC SURGERY (CARDIOTHORACIC VASCULAR SURGERY)

## 2021-08-03 PROCEDURE — 1036F TOBACCO NON-USER: CPT | Performed by: THORACIC SURGERY (CARDIOTHORACIC VASCULAR SURGERY)

## 2021-08-03 PROCEDURE — G8417 CALC BMI ABV UP PARAM F/U: HCPCS | Performed by: THORACIC SURGERY (CARDIOTHORACIC VASCULAR SURGERY)

## 2021-08-03 PROCEDURE — G8427 DOCREV CUR MEDS BY ELIG CLIN: HCPCS | Performed by: THORACIC SURGERY (CARDIOTHORACIC VASCULAR SURGERY)

## 2021-08-03 RX ORDER — SODIUM CHLORIDE 9 MG/ML
25 INJECTION, SOLUTION INTRAVENOUS PRN
Status: CANCELLED | OUTPATIENT
Start: 2021-08-03

## 2021-08-03 RX ORDER — CHLORHEXIDINE GLUCONATE ORAL RINSE 1.2 MG/ML
15 SOLUTION DENTAL ONCE
Status: CANCELLED | OUTPATIENT
Start: 2021-08-03 | End: 2021-08-03

## 2021-08-03 RX ORDER — SODIUM CHLORIDE 0.9 % (FLUSH) 0.9 %
10 SYRINGE (ML) INJECTION EVERY 12 HOURS SCHEDULED
Status: CANCELLED | OUTPATIENT
Start: 2021-08-03

## 2021-08-03 RX ORDER — SODIUM CHLORIDE 9 MG/ML
INJECTION, SOLUTION INTRAVENOUS CONTINUOUS
Status: CANCELLED | OUTPATIENT
Start: 2021-08-03

## 2021-08-03 RX ORDER — SODIUM CHLORIDE 0.9 % (FLUSH) 0.9 %
10 SYRINGE (ML) INJECTION PRN
Status: CANCELLED | OUTPATIENT
Start: 2021-08-03

## 2021-08-03 RX ORDER — CHLORHEXIDINE GLUCONATE 4 G/100ML
SOLUTION TOPICAL ONCE
Status: CANCELLED | OUTPATIENT
Start: 2021-08-03 | End: 2021-08-03

## 2021-08-03 ASSESSMENT — ENCOUNTER SYMPTOMS
COUGH: 0
CONSTIPATION: 0
ABDOMINAL PAIN: 0

## 2021-08-03 NOTE — H&P
Patient ID: Edgardo Short is a 63 y.o. male. CC: SOB    This is a 62 yo male known to our service. He is being followed for AS, ICM, CAD. He is here to re-discuss surgery now that he has had GABRIELE. Cardiac MRI was ordered but we were told he was \" too large\" to get the scan. His past medical history includes hypertension. He was initially seen on 6/22/2021 upon consultation from cardiology during a recent hospital admission for cardiomyopathy after complaints of progressively worsening leg swelling and shortness of breath. He was found to be in CHF and was Latha Noon was found to have severe AS (bicuspid AV with YESI 0.8 and mean gradient 30mm Hg) and heart cath was done showing CAD.  Currently he denies current CP, SOB, HAMILTON, increasing LE edema, N/V, F/C, orthopnea, PND and syncope. Dental clearance has been obtained on 6/23/21. GABRIELE confirms severe low flow low gradient AS along with LVEF 20-25% and reduced Right ventricle global systolic function.     Past Medical History:   Diagnosis Date    CHF (congestive heart failure) ([address]     [facility]     Hypertension        Past Surgical History:   Procedure Laterality Date    CARDIAC CATHETERIZATION  2021    HERNIA REPAIR      TRANSESOPHAGEAL ECHOCARDIOGRAM  07/12/2021       Family History   Problem Relation Age of Onset    Other Mother         esophageal tumor    Heart Disease Father     Breast Cancer Sister     No Known Problems Brother     No Known Problems Brother     No Known Problems Sister        No Known Allergies      Current Outpatient Medications:     furosemide (LASIX) 40 MG tablet, Take 1 tablet by mouth 2 times daily, Disp: 60 tablet, Rfl: 3    atorvastatin (LIPITOR) 40 MG tablet, Take 1 tablet by mouth nightly, Disp: 30 tablet, Rfl: 3    spironolactone (ALDACTONE) 25 MG tablet, Take 1 tablet by mouth daily, Disp: 30 tablet, Rfl: 3    metoprolol succinate (TOPROL XL) 50 MG extended release tablet, Take 1 tablet by mouth daily, Disp: 30 tablet, Rfl: 3    amLODIPine (NORVASC) 10 MG tablet, Take 10 mg by mouth daily, Disp: , Rfl:     insulin glargine (BASAGLAR KWIKPEN) 100 UNIT/ML injection pen, Inject 50 Units into the skin nightly, Disp: , Rfl:     aspirin 81 MG EC tablet, Take 81 mg by mouth daily, Disp: , Rfl:     glyBURIDE (DIABETA) 5 MG tablet, Take 5 mg by mouth 2 times daily , Disp: , Rfl:     lisinopril (PRINIVIL;ZESTRIL) 20 MG tablet, Take 20 mg by mouth 2 times daily , Disp: , Rfl:     Social History     Tobacco Use    Smoking status: Former Smoker     Packs/day: 1.50     Types: Cigarettes     Start date: 6/15/1973     Quit date: 6/15/2001     Years since quittin.1    Smokeless tobacco: Never Used   Substance Use Topics    Alcohol use: Yes     Comment: occasional       Vitals:    21 0925 21 0929   BP: (!) 133/95 (!) 132/94   Site: Left Upper Arm    Position: Sitting    Pulse: 102    SpO2: 95%    Weight: 283 lb (128.4 kg)    Height: 6' 3\" (1.905 m)        Subjective:          Review of Systems   Constitutional: Positive for fatigue. Negative for chills and fever. Respiratory: Negative for cough. Chronic HAMILTON   Cardiovascular: Negative for chest pain and palpitations. Gastrointestinal: Negative for abdominal pain and constipation. Neurological: Negative for syncope and light-headedness. Objective:   Physical Exam  Constitutional:       General: He is not in acute distress. Cardiovascular:      Rate and Rhythm: Normal rate and regular rhythm. Heart sounds: Murmur heard. Pulmonary:      Effort: Pulmonary effort is normal. No respiratory distress. Abdominal:      Palpations: Abdomen is soft. Tenderness: There is no guarding. Musculoskeletal:      Comments: bilat LE edema   Skin:     General: Skin is warm and dry. Neurological:      General: No focal deficit present. Mental Status: He is alert and oriented to person, place, and time.    Psychiatric: Mood and Affect: Mood normal.         Behavior: Behavior normal.         Assessment:      Aortic Stenosis, CAD, reduced LVEF, RV dysfunction      Date    CHF (congestive heart failure) (HCC)     [facility] mellitus ([facility]     Hypertension              Plan:      He was unable to get the MRI. PAT on 8/11 with OR 8/13 for AVR with CABG to LAD and D1 with possible Impella. All risks, benefits, alternatives and potential complications explained thoroughly including, but not limited to, bleeding, infection, lung injury, kidney injury, stroke, heart attack, prolonged ventilation, wound complication, need for re-operation, and death, and the patient agrees to proceed. Dental is in the chart.  Cleared On 6/23/21  impella rep notified  hold ACE day of surgery

## 2021-08-03 NOTE — TELEPHONE ENCOUNTER
This patient has [employer] and I have submitted this case for authorization but Rehana Newberry takes 14 days for review so we may  not have an authorization prior to 8/13/2021 since it is giving them 10 days  Livia Aiken

## 2021-08-03 NOTE — LETTER
[address] [facility]  [address] [address] [facility] [address] [place] [address]  Phone: [phone]  Fax: [phone]    Hortensia Ordonez MD    August 3, 2021     Rita Cabrera III, [place] [address]    Patient: Aresn Cyr   MR Number: [id number]   YOB: 1960   Date of Visit: 8/3/2021       Dear Rita Cabrera III:    Thank you for referring Kelly Augustine to me for evaluation/treatment. Below are the relevant portions of my assessment and plan of care.        Past Medical History:   Diagnosis Date    CHF (congestive heart failure) ([address]     Diabetes mellitus ([facility]     Hypertension        Past Surgical History:   Procedure Laterality Date    CARDIAC CATHETERIZATION  2021    HERNIA REPAIR      TRANSESOPHAGEAL ECHOCARDIOGRAM  07/12/2021       Family History   Problem Relation Age of Onset    Other Mother         esophageal tumor    Heart Disease Father     Breast Cancer Sister     No Known Problems Brother     No Known Problems Brother     No Known Problems Sister        No Known Allergies      Current Outpatient Medications:     furosemide (LASIX) 40 MG tablet, Take 1 tablet by mouth 2 times daily, Disp: 60 tablet, Rfl: 3    atorvastatin (LIPITOR) 40 MG tablet, Take 1 tablet by mouth nightly, Disp: 30 tablet, Rfl: 3    spironolactone (ALDACTONE) 25 MG tablet, Take 1 tablet by mouth daily, Disp: 30 tablet, Rfl: 3    metoprolol succinate (TOPROL XL) 50 MG extended release tablet, Take 1 tablet by mouth daily, Disp: 30 tablet, Rfl: 3    amLODIPine (NORVASC) 10 MG tablet, Take 10 mg by mouth daily, Disp: , Rfl:     insulin glargine (BASAGLAR KWIKPEN) 100 UNIT/ML injection pen, Inject 50 Units into the skin nightly, Disp: , Rfl:     aspirin 81 MG EC tablet, Take 81 mg by mouth daily, Disp: , Rfl:     glyBURIDE (DIABETA) 5 MG tablet, Take 5 mg by mouth 2 times daily , Disp: , Rfl:     lisinopril (PRINIVIL;ZESTRIL) 20 MG tablet, Take 20 mg by mouth 2 times daily , Disp: , Rfl:     Social History     Tobacco Use    Smoking status: Former Smoker     Packs/day: 1.50     Types: Cigarettes     Start date: 6/15/1973     Quit date: 6/15/2001     Years since quittin.1    Smokeless tobacco: Never Used   Substance Use Topics    Alcohol use: Yes     Comment: occasional       Vitals:    21 0925 21 0929   BP: (!) 133/95 (!) 132/94   Site: Left Upper Arm    Position: Sitting    Pulse: 102    SpO2: 95%    Weight: 283 lb (128.4 kg)    Height: 6' 3\" (1.905 m)        Subjective:      Patient ID: Alethea Welch is a 63 y.o. male. CC: SOB    This is a 62 yo male known to our service. He is being followed for AS, ICM, CAD. He is here to re-discuss surgery now that he has had GABRIELE. Cardiac MRI was ordered but we were told he was \" too large\" to get the scan. His past medical history includes hypertension. He was initially seen on 2021 upon consultation from cardiology during a recent hospital admission for cardiomyopathy after complaints of progressively worsening leg swelling and shortness of breath. He was found to be in CHF and was Lequita Shires was found to have severe AS (bicuspid AV with YESI 0.8 and mean gradient 30mm Hg) and heart cath was done showing CAD.  Currently he denies current CP, SOB, HAMILTON, increasing LE edema, N/V, F/C, orthopnea, PND and syncope. Dental clearance has been obtained on 21. GABRIELE confirms severe low flow low gradient AS along with LVEF 20-25% and reduced Right ventricle global systolic function. HPI    Review of Systems   Constitutional: Positive for fatigue. Negative for chills and fever. Respiratory: Negative for cough. Chronic HAMILTON   Cardiovascular: Negative for chest pain and palpitations. Gastrointestinal: Negative for abdominal pain and constipation. Neurological: Negative for syncope and light-headedness.        Objective:   Physical Exam  Constitutional: General: He is not in acute distress. Cardiovascular:      Rate and Rhythm: Normal rate and regular rhythm. Heart sounds: Murmur heard. Pulmonary:      Effort: Pulmonary effort is normal. No respiratory distress. Abdominal:      Palpations: Abdomen is soft. Tenderness: There is no guarding. Musculoskeletal:      Comments: bilat LE edema   Skin:     General: Skin is warm and dry. Neurological:      General: No focal deficit present. Mental Status: He is alert and oriented to person, place, and time. Psychiatric:         Mood and Affect: Mood normal.         Behavior: Behavior normal.         Assessment:      AS, CAD      Plan:      He was unable to get the MRI. PAT on 8/11 with OR 8/13 for AVR with CABG to LAD and D1 with possible Impella. All risks, benefits, alternatives and potential complications explained thoroughly including, but not limited to, bleeding, infection, lung injury, kidney injury, stroke, heart attack, prolonged ventilation, wound complication, need for re-operation, and death, and the patient agrees to proceed. Dental is in the chart. If you have questions, please do not hesitate to call me. I look forward to following Lety Jennings along with you.     Sincerely,        Allegra Dickey MD

## 2021-08-05 ENCOUNTER — TELEPHONE (OUTPATIENT)
Dept: CARDIOTHORACIC SURGERY | Age: 61
End: 2021-08-05

## 2021-08-09 ENCOUNTER — HOSPITAL ENCOUNTER (OUTPATIENT)
Age: 61
Discharge: HOME | End: 2021-08-11
Payer: COMMERCIAL

## 2021-08-09 DIAGNOSIS — U07.1 COVID-19: ICD-10-CM

## 2021-08-09 PROCEDURE — U0005 INFEC AGEN DETEC AMPLI PROBE: HCPCS

## 2021-08-09 PROCEDURE — U0003 INFECTIOUS AGENT DETECTION BY NUCLEIC ACID (DNA OR RNA); SEVERE ACUTE RESPIRATORY SYNDROME CORONAVIRUS 2 (SARS-COV-2) (CORONAVIRUS DISEASE [COVID-19]), AMPLIFIED PROBE TECHNIQUE, MAKING USE OF HIGH THROUGHPUT TECHNOLOGIES AS DESCRIBED BY CMS-2020-01-R: HCPCS

## 2021-08-09 NOTE — PROGRESS NOTES
[address] PRE-ADMISSION TESTING GENERAL INSTRUCTIONS- [facility]-phone number:[phone]    GENERAL INSTRUCTIONS  [x] Antibacterial Soap shower Night before and/or AM of Surgery  [x] Nothing by mouth after midnight, including gum, candy, mints, or water. [x] You may brush your teeth, gargle, but do NOT swallow water. [x]Hibiclens shower  the night before and the morning of surgery. Do not use             Hibiclens on your face or head. [x]No smoking, chewing tobacco, illegal drugs, or alcohol within 24 hours of your surgery. [x] Jewelry, valuables or body piercing's should not be brought to the hospital. All body and/or tongue piercing's must be removed prior to arriving to hospital.  ALL hair pins must be removed. [x] Do not wear makeup, lotions, powders, deodorant. Nail polish as directed by the nurse. [x] Arrange transportation with a responsible adult  to and from the hospital. If you do not have a responsible adult  to transport you, you will need to make arrangements with a medical transportation company (i.e. [employer]. A Uber/taxi/bus is not appropriate unless you are accompanied by a responsible adult ). Arrange for someone to be with you for the remainder of the day and for 24 hours after your procedure due to having had anesthesia. Who will be your  for transportation? [x] Bring insurance card and photo ID. [x] Transfusion Bracelet: Please bring with you to hospital, day of surgery     PARKING INSTRUCTIONS:   [x] Arrival Time: August 13 th at 5 AM, one person may accompany you, wear mask please  · [x] Parking lot '\"I\"  is located on [facility] (the corner of [facility]). To enter, press the button and the gate will lift. A free token will be provided to exit the lot. One car per patient is allowed to park in this lot. All other cars are to park on [address] [place] [address] either in the parking garage or the handicap lot. [] To reach the [facility] from [address] [facility], upon entering the hospital, take elevator B to the 3rd floor. EDUCATION INSTRUCTIONS:         [x] Pre-admission Testing educational folder given  [x] Incentive Spirometry,coughing & deep breathing exercises reviewed. [x]Medication information sheet(s)   [x]Fluoroscopy-Xray used in surgery reviewed with patient. Educational pamphlet placed in chart. [x]Pain: Post-op pain is normal and to be expected. You will be asked to rate your pain from 0-10(a zero is not acceptable-education is needed). Your post-op pain goal is:5  [x] Ask your nurse for your pain medication. MEDICATION INSTRUCTIONS:   [x]Bring a complete list of your medications, please write the last time you took the medicine, give this list to the nurse. [x] Take the following medications the morning of surgery with 1-2 ounces of water:   [x] Stop herbal supplements and vitamins 5 days before your surgery. [x] DO NOT take any diabetic medicine the morning of surgery. Follow instructions for insulin the day before surgery. [x] If you are diabetic and your blood sugar is low or you feel symptomatic, you may drink 1-2 ounces of apple juice or take a glucose tablet. The morning of your procedure, you may call the pre-op area if you have concerns about your blood sugar [phone]. [x] Follow physician instructions regarding any blood thinners you may be taking. WHAT TO EXPECT:  [x] The day of surgery you will be greeted and checked in by the [employer].  In addition, you will be registered in the [facility] by a Patient Access Representative. Please bring your photo ID and insurance card. A nurse will greet you in accordance to the time you are needed in the pre-op area to prepare you for surgery. Please do not be discouraged if you are not greeted in the order you arrive as there are many variables that are involved in patient preparation.   Your patience is greatly appreciated as you wait for your nurse. Please bring in items such as: books, magazines, newspapers, electronics, or any other items  to occupy your time in the waiting area. [x]  Delays may occur with surgery and staff will make a sincere effort to keep you informed of delays. If any delays occur with your procedure, we apologize ahead of time for your inconvenience as we recognize the value of your time.

## 2021-08-10 ENCOUNTER — HOSPITAL ENCOUNTER (OUTPATIENT)
Dept: GENERAL RADIOLOGY | Age: 61
Discharge: HOME | End: 2021-08-12
Payer: COMMERCIAL

## 2021-08-10 ENCOUNTER — HOSPITAL ENCOUNTER (OUTPATIENT)
Age: 61
Discharge: HOME | End: 2021-08-10
Payer: COMMERCIAL

## 2021-08-10 ENCOUNTER — ANESTHESIA EVENT (OUTPATIENT)
Dept: OPERATING ROOM | Age: 61
DRG: 163 | End: 2021-08-10
Payer: COMMERCIAL

## 2021-08-10 ENCOUNTER — HOSPITAL ENCOUNTER (OUTPATIENT)
Dept: PREADMISSION TESTING | Age: 61
Setting detail: SURGERY ADMIT
Discharge: HOME | DRG: 163 | End: 2021-08-10
Payer: COMMERCIAL

## 2021-08-10 VITALS
RESPIRATION RATE: 20 BRPM | HEART RATE: 90 BPM | OXYGEN SATURATION: 96 % | BODY MASS INDEX: 35.25 KG/M2 | DIASTOLIC BLOOD PRESSURE: 90 MMHG | WEIGHT: 282 LBS | SYSTOLIC BLOOD PRESSURE: 151 MMHG | TEMPERATURE: 97.9 F

## 2021-08-10 DIAGNOSIS — Z01.812 PRE-OPERATIVE LABORATORY EXAMINATION: Primary | ICD-10-CM

## 2021-08-10 DIAGNOSIS — R07.9 CHEST PAIN, UNSPECIFIED TYPE: ICD-10-CM

## 2021-08-10 LAB
ABO/RH: NORMAL
ALBUMIN SERPL-MCNC: 3.5 G/DL (ref 3.5–5.2)
ALP BLD-CCNC: 73 U/L (ref 40–129)
ALT SERPL-CCNC: 22 U/L (ref 0–40)
ANION GAP SERPL CALCULATED.3IONS-SCNC: 13 MMOL/L (ref 7–16)
ANTIBODY SCREEN: NORMAL
AST SERPL-CCNC: 24 U/L (ref 0–39)
BACTERIA: ABNORMAL /HPF
BILIRUB SERPL-MCNC: 1 MG/DL (ref 0–1.2)
BILIRUBIN URINE: NEGATIVE
BLOOD, URINE: ABNORMAL
BUN BLDV-MCNC: 15 MG/DL (ref 6–23)
CALCIUM SERPL-MCNC: 9 MG/DL (ref 8.6–10.2)
CHLORIDE BLD-SCNC: 101 MMOL/L (ref 98–107)
CLARITY: CLEAR
CO2: 21 MMOL/L (ref 22–29)
COLOR: YELLOW
CREAT SERPL-MCNC: 1.2 MG/DL (ref 0.7–1.2)
EKG ATRIAL RATE: 89 BPM
EKG P AXIS: 45 DEGREES
EKG P-R INTERVAL: 188 MS
EKG Q-T INTERVAL: 438 MS
EKG QRS DURATION: 136 MS
EKG QTC CALCULATION (BAZETT): 532 MS
EKG R AXIS: -52 DEGREES
EKG T AXIS: 101 DEGREES
EKG VENTRICULAR RATE: 89 BPM
GFR AFRICAN AMERICAN: >60
GFR NON-AFRICAN AMERICAN: >60 ML/MIN/1.73
GLUCOSE BLD-MCNC: 191 MG/DL (ref 74–99)
GLUCOSE URINE: NEGATIVE MG/DL
HBA1C MFR BLD: 8.4 % (ref 4–5.6)
HCT VFR BLD CALC: 41 % (ref 37–54)
HEMOGLOBIN: 13.3 G/DL (ref 12.5–16.5)
KETONES, URINE: NEGATIVE MG/DL
LEUKOCYTE ESTERASE, URINE: NEGATIVE
MCH RBC QN AUTO: 30.6 PG (ref 26–35)
MCHC RBC AUTO-ENTMCNC: 32.4 % (ref 32–34.5)
MCV RBC AUTO: 94.3 FL (ref 80–99.9)
NITRITE, URINE: NEGATIVE
PDW BLD-RTO: 15.7 FL (ref 11.5–15)
PH UA: 6 (ref 5–9)
PLATELET # BLD: 202 E9/L (ref 130–450)
PMV BLD AUTO: 11 FL (ref 7–12)
POTASSIUM SERPL-SCNC: 3.5 MMOL/L (ref 3.5–5)
PROTEIN UA: 100 MG/DL
RBC # BLD: 4.35 E12/L (ref 3.8–5.8)
RBC UA: ABNORMAL /HPF (ref 0–2)
REASON FOR REJECTION: NORMAL
REJECTED TEST: NORMAL
SARS-COV-2, PCR: NOT DETECTED
SODIUM BLD-SCNC: 135 MMOL/L (ref 132–146)
SPECIFIC GRAVITY UA: 1.01 (ref 1–1.03)
TOTAL PROTEIN: 6.7 G/DL (ref 6.4–8.3)
UROBILINOGEN, URINE: 1 E.U./DL
WBC # BLD: 11 E9/L (ref 4.5–11.5)
WBC UA: ABNORMAL /HPF (ref 0–5)

## 2021-08-10 PROCEDURE — 81001 URINALYSIS AUTO W/SCOPE: CPT

## 2021-08-10 PROCEDURE — 87081 CULTURE SCREEN ONLY: CPT

## 2021-08-10 PROCEDURE — 85027 COMPLETE CBC AUTOMATED: CPT

## 2021-08-10 PROCEDURE — 93010 ELECTROCARDIOGRAM REPORT: CPT | Performed by: INTERNAL MEDICINE

## 2021-08-10 PROCEDURE — 87088 URINE BACTERIA CULTURE: CPT

## 2021-08-10 PROCEDURE — 86850 RBC ANTIBODY SCREEN: CPT

## 2021-08-10 PROCEDURE — 36415 COLL VENOUS BLD VENIPUNCTURE: CPT

## 2021-08-10 PROCEDURE — 71046 X-RAY EXAM CHEST 2 VIEWS: CPT

## 2021-08-10 PROCEDURE — 80053 COMPREHEN METABOLIC PANEL: CPT

## 2021-08-10 PROCEDURE — 93005 ELECTROCARDIOGRAM TRACING: CPT

## 2021-08-10 PROCEDURE — 86900 BLOOD TYPING SEROLOGIC ABO: CPT

## 2021-08-10 PROCEDURE — 83036 HEMOGLOBIN GLYCOSYLATED A1C: CPT

## 2021-08-10 PROCEDURE — 86901 BLOOD TYPING SEROLOGIC RH(D): CPT

## 2021-08-10 ASSESSMENT — PAIN SCALES - GENERAL: PAINLEVEL_OUTOF10: 0

## 2021-08-11 LAB — MRSA CULTURE ONLY: NORMAL

## 2021-08-12 LAB — URINE CULTURE, ROUTINE: NORMAL

## 2021-08-13 ENCOUNTER — ANESTHESIA (OUTPATIENT)
Dept: OPERATING ROOM | Age: 61
DRG: 163 | End: 2021-08-13
Payer: COMMERCIAL

## 2021-08-17 NOTE — PROGRESS NOTES
Patient agreed to COVID test on 8-25 at the  [address] [place] [address] between the hours of 6 am- 10 am located at  [address] [place] [address]. Patient instructed to bring ID. Patient instructed to self8-25 isolate until day of surgery.

## 2021-08-17 NOTE — H&P
Patient ID: Millicent Pepe is a 63 y.o. male. CC: SOB     This is a 64 yo male known to our service. He is being followed for AS, ICM, CAD. He is here to re-discuss surgery now that he has had GABRIELE. Cardiac MRI was ordered but we were told he was \" too large\" to get the scan.   His past medical history includes hypertension. Marina Sanders was initially seen on 6/22/2021 upon consultation from cardiology during a recent hospital admission for cardiomyopathy after complaints of progressively worsening leg swelling and shortness of breath. He was found to be in CHF and was Coralee Gaucher was found to have severe AS (bicuspid AV with YESI 0.8 and mean gradient 30mm Hg) and heart cath was done showing CAD.  Currently he denies current CP, SOB, HAMILTON, increasing LE edema, N/V, F/C, orthopnea, PND and syncope. Dental clearance has been obtained on 6/23/21. GABRIELE confirms severe low flow low gradient AS along with LVEF 20-25% and reduced Right ventricle global systolic function.     Surgery was originally scheduled for 8/13, however due to emergent inpatient surgeries, this elective case has been rescheduled for 8/30.           Past Medical History:   Diagnosis Date    CHF (congestive heart failure) ([address]      Diabetes mellitus ([address]      Hypertension           Past Surgical History:   Procedure Laterality Date    CARDIAC CATHETERIZATION   2021    HERNIA REPAIR        TRANSESOPHAGEAL ECHOCARDIOGRAM   07/12/2021               Family History   Problem Relation Age of Onset    Other Mother           esophageal tumor    Heart Disease Father      Breast Cancer Sister      No Known Problems Brother      No Known Problems Brother      No Known Problems Sister           No Known Allergies        Current Outpatient Medications:     furosemide (LASIX) 40 MG tablet, Take 1 tablet by mouth 2 times daily, Disp: 60 tablet, Rfl: 3    atorvastatin (LIPITOR) 40 MG tablet, Take 1 tablet by mouth nightly, Disp: 30 tablet, Rfl: 3   spironolactone (ALDACTONE) 25 MG tablet, Take 1 tablet by mouth daily, Disp: 30 tablet, Rfl: 3    metoprolol succinate (TOPROL XL) 50 MG extended release tablet, Take 1 tablet by mouth daily, Disp: 30 tablet, Rfl: 3    amLODIPine (NORVASC) 10 MG tablet, Take 10 mg by mouth daily, Disp: , Rfl:     insulin glargine (BASAGLAR KWIKPEN) 100 UNIT/ML injection pen, Inject 50 Units into the skin nightly, Disp: , Rfl:     aspirin 81 MG EC tablet, Take 81 mg by mouth daily, Disp: , Rfl:     glyBURIDE (DIABETA) 5 MG tablet, Take 5 mg by mouth 2 times daily , Disp: , Rfl:     lisinopril (PRINIVIL;ZESTRIL) 20 MG tablet, Take 20 mg by mouth 2 times daily , Disp: , Rfl:      Social History            Tobacco Use    Smoking status: Former Smoker       Packs/day: 1.50       Types: Cigarettes       Start date: 6/15/1973       Quit date: 6/15/2001       Years since quittin.1    Smokeless tobacco: Never Used   Substance Use Topics    Alcohol use: Yes       Comment: occasional              Vitals:     21 0925 21 0929   BP: (!) 133/95 (!) 132/94   Site: Left Upper Arm     Position: Sitting     Pulse: 102     SpO2: 95%     Weight: 283 lb (128.4 kg)     Height: 6' 3\" (1.905 m)           Subjective:            Review of Systems   Constitutional: Positive for fatigue. Negative for chills and fever. Respiratory: Negative for cough. Chronic HAMILTON   Cardiovascular: Negative for chest pain and palpitations. Gastrointestinal: Negative for abdominal pain and constipation. Neurological: Negative for syncope and light-headedness.         Objective:   Physical Exam  Constitutional:       General: He is not in acute distress. Cardiovascular:      Rate and Rhythm: Normal rate and regular rhythm. Heart sounds: Murmur heard. Pulmonary:      Effort: Pulmonary effort is normal. No respiratory distress. Abdominal:      Palpations: Abdomen is soft. Tenderness: There is no guarding.    Musculoskeletal: Comments: bilat LE edema   Skin:     General: Skin is warm and dry. Neurological:      General: No focal deficit present. Mental Status: He is alert and oriented to person, place, and time. Psychiatric:         Mood and Affect: Mood normal.         Behavior: Behavior normal.            Assessment:      Aortic Stenosis, CAD, reduced LVEF, RV dysfunction             Date    CHF [facility]congestive heart failure) ([facility])      Diabetes mellitus ([facility]      Hypertension                            Plan:      He was unable to get the MRI. PAT for labs with OR 8/30 for AVR with CABG to LAD and D1 with possible Impella. All risks, benefits, alternatives and potential complications explained thoroughly including, but not limited to, bleeding, infection, lung injury, kidney injury, stroke, heart attack, prolonged ventilation, wound complication, need for re-operation, and death, and the patient agrees to proceed. Dental is in the chart.  Cleared On 6/23/21  impella rep notified  hold ACE day of surgery

## 2021-08-18 ENCOUNTER — TELEPHONE (OUTPATIENT)
Dept: CARDIOTHORACIC SURGERY | Age: 61
End: 2021-08-18

## 2021-08-25 ENCOUNTER — HOSPITAL ENCOUNTER (OUTPATIENT)
Age: 61
Discharge: HOME | End: 2021-08-27
Payer: COMMERCIAL

## 2021-08-25 DIAGNOSIS — U07.1 COVID-19: ICD-10-CM

## 2021-08-25 PROCEDURE — U0005 INFEC AGEN DETEC AMPLI PROBE: HCPCS

## 2021-08-25 PROCEDURE — U0003 INFECTIOUS AGENT DETECTION BY NUCLEIC ACID (DNA OR RNA); SEVERE ACUTE RESPIRATORY SYNDROME CORONAVIRUS 2 (SARS-COV-2) (CORONAVIRUS DISEASE [COVID-19]), AMPLIFIED PROBE TECHNIQUE, MAKING USE OF HIGH THROUGHPUT TECHNOLOGIES AS DESCRIBED BY CMS-2020-01-R: HCPCS

## 2021-08-25 NOTE — PROGRESS NOTES
[address] PRE-ADMISSION TESTING GENERAL INSTRUCTIONS- [facility]-phone number:[phone]    GENERAL INSTRUCTIONS  [x] Antibacterial Soap shower Night before and/or AM of Surgery    [x] Nothing by mouth after midnight, including gum, candy, mints, or water. [x] You may brush your teeth, gargle, but do NOT swallow water. [x]Hibiclens shower  the night before and the morning of surgery. Do not use             Hibiclens on your face or head. [x]No smoking, chewing tobacco, illegal drugs, or alcohol within 24 hours of your surgery. [x] Jewelry, valuables or body piercing's should not be brought to the hospital. All body and/or tongue piercing's must be removed prior to arriving to hospital.  ALL hair pins must be removed. [x] Do not wear makeup, lotions, powders, deodorant. Nail polish as directed by the nurse. [x] Arrange transportation with a responsible adult  to and from the hospital. If you do not have a responsible adult  to transport you, you will need to make arrangements with a medical transportation company (i.e. [employer]. A Uber/taxi/bus is not appropriate unless you are accompanied by a responsible adult ). Arrange for someone to be with you for the remainder of the day and for 24 hours after your procedure due to having had anesthesia. Who will be your  for transportation? Driving self day of surgery    [x] Bring insurance card and photo ID. [x] Transfusion Bracelet: Please bring with you to hospital, day of surgery     PARKING INSTRUCTIONS:   [x] Arrival Time: 5 am, you and your  will need to wear a mask. · [x] Parking lot '\"I\"  is located on [facility] (the corner of [facility] and [facility]). To enter, press the button and the gate will lift. A free token will be provided to exit the lot. One car per patient is allowed to park in this lot.  All other cars are to park on [address] [place] [address] either in the parking garage or the handicap lot. Walk up the front walk to the [facility], the door will be locked an employee will greet you and let you in. EDUCATION INSTRUCTIONS:        [x] Pre-admission Testing educational folder given  [x] Incentive Spirometry,coughing & deep breathing exercises reviewed. [x]Medication information sheet(s)   . [x]Pain: Post-op pain is normal and to be expected. You will be asked to rate your pain from 0-10 (a zero is not acceptable-education is needed). Your post-op pain goal is:  [x] Ask your nurse for your pain medication. MEDICATION INSTRUCTIONS:  [x]Bring a complete list of your medications, please write the last time you took the medicine, give this list to the nurse. [x] Take the following medications the morning of surgery with 1-2 ounces of water: aspirin, metoprolol succinate take half the dose morning of surgery,   [x] Stop herbal supplements and vitamins 5 days before your surgery. [x] DO NOT take any diabetic medicine the morning of surgery. Follow instructions for insulin the day before surgery. Insulin glargine take half the dose the night before surgery  [x] If you are diabetic and your blood sugar is low or you feel symptomatic, you may drink 1-2 ounces of apple juice or take a glucose tablet. The morning of your procedure, you may call the pre-op area if you have concerns about your blood sugar [phone]. [x] Follow physician instructions regarding any blood thinners you may be taking. WHAT TO EXPECT:  [x] The day of surgery you will be greeted and checked in by the [employer]. Please bring your photo ID and insurance card. A nurse will greet you in accordance to the time you are needed in the pre-op area to prepare you for surgery. Please do not be discouraged if you are not greeted in the order you arrive as there are many variables that are involved in patient preparation.   Your patience is greatly appreciated as you wait for your nurse. Please bring in items such as: books, magazines, newspapers, electronics, or any other items  to occupy your time in the waiting area. [x]  Delays may occur with surgery and staff will make a sincere effort to keep you informed of delays. If any delays occur with your procedure, we apologize ahead of time for your inconvenience as we recognize the value of your time.

## 2021-08-26 ENCOUNTER — HOSPITAL ENCOUNTER (OUTPATIENT)
Dept: PREADMISSION TESTING | Age: 61
Discharge: HOME | End: 2021-08-26
Payer: COMMERCIAL

## 2021-08-26 VITALS
BODY MASS INDEX: 35.4 KG/M2 | WEIGHT: 283.19 LBS | TEMPERATURE: 98.3 F | SYSTOLIC BLOOD PRESSURE: 150 MMHG | DIASTOLIC BLOOD PRESSURE: 108 MMHG | OXYGEN SATURATION: 95 % | HEART RATE: 94 BPM | RESPIRATION RATE: 28 BRPM

## 2021-08-26 DIAGNOSIS — Z01.818 PRE-OP EVALUATION: ICD-10-CM

## 2021-08-26 LAB
ABO/RH: NORMAL
ALBUMIN SERPL-MCNC: 3.6 G/DL (ref 3.5–5.2)
ALP BLD-CCNC: 83 U/L (ref 40–129)
ALT SERPL-CCNC: 24 U/L (ref 0–40)
ANION GAP SERPL CALCULATED.3IONS-SCNC: 13 MMOL/L (ref 7–16)
ANTIBODY SCREEN: NORMAL
APTT: 28.8 SEC (ref 24.5–35.1)
AST SERPL-CCNC: 26 U/L (ref 0–39)
BACTERIA: ABNORMAL /HPF
BILIRUB SERPL-MCNC: 0.7 MG/DL (ref 0–1.2)
BILIRUBIN URINE: NEGATIVE
BLOOD, URINE: NEGATIVE
BUN BLDV-MCNC: 17 MG/DL (ref 6–23)
CALCIUM SERPL-MCNC: 9 MG/DL (ref 8.6–10.2)
CHLORIDE BLD-SCNC: 103 MMOL/L (ref 98–107)
CLARITY: CLEAR
CO2: 21 MMOL/L (ref 22–29)
COLOR: YELLOW
CREAT SERPL-MCNC: 1.1 MG/DL (ref 0.7–1.2)
EKG ATRIAL RATE: 98 BPM
EKG P AXIS: 9 DEGREES
EKG P-R INTERVAL: 190 MS
EKG Q-T INTERVAL: 402 MS
EKG QRS DURATION: 128 MS
EKG QTC CALCULATION (BAZETT): 513 MS
EKG R AXIS: -58 DEGREES
EKG T AXIS: 96 DEGREES
EKG VENTRICULAR RATE: 98 BPM
GFR AFRICAN AMERICAN: >60
GFR NON-AFRICAN AMERICAN: >60 ML/MIN/1.73
GLUCOSE BLD-MCNC: 235 MG/DL (ref 74–99)
GLUCOSE URINE: NEGATIVE MG/DL
HBA1C MFR BLD: 8 % (ref 4–5.6)
HCT VFR BLD CALC: 43.3 % (ref 37–54)
HEMOGLOBIN: 14.2 G/DL (ref 12.5–16.5)
INR BLD: 1.3
KETONES, URINE: NEGATIVE MG/DL
LEUKOCYTE ESTERASE, URINE: NEGATIVE
MCH RBC QN AUTO: 30.9 PG (ref 26–35)
MCHC RBC AUTO-ENTMCNC: 32.8 % (ref 32–34.5)
MCV RBC AUTO: 94.1 FL (ref 80–99.9)
NITRITE, URINE: NEGATIVE
PDW BLD-RTO: 16.5 FL (ref 11.5–15)
PH UA: 5 (ref 5–9)
PLATELET # BLD: 207 E9/L (ref 130–450)
PMV BLD AUTO: 11.2 FL (ref 7–12)
POTASSIUM SERPL-SCNC: 3.6 MMOL/L (ref 3.5–5)
PROTEIN UA: 100 MG/DL
PROTHROMBIN TIME: 14.5 SEC (ref 9.3–12.4)
RBC # BLD: 4.6 E12/L (ref 3.8–5.8)
RBC UA: ABNORMAL /HPF (ref 0–2)
SODIUM BLD-SCNC: 137 MMOL/L (ref 132–146)
SPECIFIC GRAVITY UA: 1.02 (ref 1–1.03)
TOTAL PROTEIN: 6.9 G/DL (ref 6.4–8.3)
UROBILINOGEN, URINE: 2 E.U./DL
WBC # BLD: 8.9 E9/L (ref 4.5–11.5)
WBC UA: ABNORMAL /HPF (ref 0–5)

## 2021-08-26 PROCEDURE — 80053 COMPREHEN METABOLIC PANEL: CPT

## 2021-08-26 PROCEDURE — 93010 ELECTROCARDIOGRAM REPORT: CPT | Performed by: INTERNAL MEDICINE

## 2021-08-26 PROCEDURE — 85027 COMPLETE CBC AUTOMATED: CPT

## 2021-08-26 PROCEDURE — 81001 URINALYSIS AUTO W/SCOPE: CPT

## 2021-08-26 PROCEDURE — 86923 COMPATIBILITY TEST ELECTRIC: CPT

## 2021-08-26 PROCEDURE — 86850 RBC ANTIBODY SCREEN: CPT

## 2021-08-26 PROCEDURE — 93005 ELECTROCARDIOGRAM TRACING: CPT | Performed by: THORACIC SURGERY (CARDIOTHORACIC VASCULAR SURGERY)

## 2021-08-26 PROCEDURE — 86901 BLOOD TYPING SEROLOGIC RH(D): CPT

## 2021-08-26 PROCEDURE — 83036 HEMOGLOBIN GLYCOSYLATED A1C: CPT

## 2021-08-26 PROCEDURE — 85730 THROMBOPLASTIN TIME PARTIAL: CPT

## 2021-08-26 PROCEDURE — 85610 PROTHROMBIN TIME: CPT

## 2021-08-26 PROCEDURE — 36415 COLL VENOUS BLD VENIPUNCTURE: CPT

## 2021-08-26 PROCEDURE — 87088 URINE BACTERIA CULTURE: CPT

## 2021-08-26 PROCEDURE — 86900 BLOOD TYPING SEROLOGIC ABO: CPT

## 2021-08-27 LAB — SARS-COV-2, PCR: NOT DETECTED

## 2021-08-27 NOTE — PROGRESS NOTES
Pre-operative testing review:   --carotids with  no significant stenosis  --cari with  good flow bilaterally  --ct chest d/w dr Gene Pop  -- GABRIELE confirms severe low flow low gradient AS (bicuspid AV) along with LVEF 20-25% and reduced Right ventricle global systolic function.   --pft with FEV1 72  percent of predicted and DLCO 40 percent of predicted  --hgA1c 8.0  --urine culture pending, UA neg for nitrites and leuk esterase

## 2021-08-28 LAB — URINE CULTURE, ROUTINE: NORMAL

## 2021-08-30 ENCOUNTER — APPOINTMENT (OUTPATIENT)
Dept: GENERAL RADIOLOGY | Age: 61
DRG: 163 | End: 2021-08-30
Attending: THORACIC SURGERY (CARDIOTHORACIC VASCULAR SURGERY)
Payer: COMMERCIAL

## 2021-08-30 ENCOUNTER — HOSPITAL ENCOUNTER (INPATIENT)
Age: 61
LOS: 8 days | Discharge: NURSING, REHAB OR LONG-TERM CARE | DRG: 163 | End: 2021-09-07
Attending: THORACIC SURGERY (CARDIOTHORACIC VASCULAR SURGERY) | Admitting: THORACIC SURGERY (CARDIOTHORACIC VASCULAR SURGERY)
Payer: COMMERCIAL

## 2021-08-30 VITALS — OXYGEN SATURATION: 99 % | RESPIRATION RATE: 18 BRPM | TEMPERATURE: 98.2 F

## 2021-08-30 DIAGNOSIS — G89.18 ACUTE POST-OPERATIVE PAIN: ICD-10-CM

## 2021-08-30 DIAGNOSIS — U07.1 COVID-19: Primary | ICD-10-CM

## 2021-08-30 DIAGNOSIS — Z01.818 PREOP TESTING: ICD-10-CM

## 2021-08-30 DIAGNOSIS — Z95.2 S/P AVR: ICD-10-CM

## 2021-08-30 DIAGNOSIS — Z95.1 S/P CABG (CORONARY ARTERY BYPASS GRAFT): ICD-10-CM

## 2021-08-30 DIAGNOSIS — Z01.812 PRE-OPERATIVE LABORATORY EXAMINATION: ICD-10-CM

## 2021-08-30 PROBLEM — I25.10 CAD IN NATIVE ARTERY: Status: ACTIVE | Noted: 2021-08-30

## 2021-08-30 LAB
ACTIVATED CLOTTING TIME: 107 SECONDS (ref 99–130)
ACTIVATED CLOTTING TIME: 121 SECONDS (ref 99–130)
ACTIVATED CLOTTING TIME: 413 SECONDS (ref 99–130)
ACTIVATED CLOTTING TIME: 466 SECONDS (ref 99–130)
ACTIVATED CLOTTING TIME: 481 SECONDS (ref 99–130)
ACTIVATED CLOTTING TIME: >1005 SECONDS (ref 99–130)
ALBUMIN SERPL-MCNC: 2.5 G/DL (ref 3.5–5.2)
ALBUMIN SERPL-MCNC: 2.7 G/DL (ref 3.5–5.2)
ALP BLD-CCNC: 54 U/L (ref 40–129)
ALP BLD-CCNC: 54 U/L (ref 40–129)
ALT SERPL-CCNC: 17 U/L (ref 0–40)
ALT SERPL-CCNC: 20 U/L (ref 0–40)
ANGLE (CLOT STRENGTH): 63.2 DEGREE (ref 59–74)
ANION GAP SERPL CALCULATED.3IONS-SCNC: 11 MMOL/L (ref 7–16)
ANION GAP SERPL CALCULATED.3IONS-SCNC: 12 MMOL/L (ref 7–16)
ANION GAP: 11 MMOL/L (ref 7–16)
ANION GAP: 12 MMOL/L (ref 7–16)
ANION GAP: 14 MMOL/L (ref 7–16)
ANION GAP: 8 MMOL/L (ref 7–16)
APTT: 29.6 SEC (ref 24.5–35.1)
AST SERPL-CCNC: 29 U/L (ref 0–39)
AST SERPL-CCNC: 39 U/L (ref 0–39)
B.E.: -2.1 MMOL/L (ref -3–0)
B.E.: -3.2 MMOL/L (ref -3–0)
B.E.: -3.8 MMOL/L (ref -3–0)
B.E.: -4.2 MMOL/L (ref -3–0)
B.E.: -4.9 MMOL/L (ref -3–0)
B.E.: -5.2 MMOL/L (ref -3–0)
B.E.: -5.3 MMOL/L (ref -3–0)
B.E.: -5.6 MMOL/L (ref -3–0)
B.E.: -5.8 MMOL/L (ref -3–0)
B.E.: -5.9 MMOL/L (ref -3–0)
B.E.: -6 MMOL/L (ref -3–0)
B.E.: -6.4 MMOL/L (ref -3–0)
B.E.: -6.5 MMOL/L (ref -3–0)
B.E.: -6.7 MMOL/L (ref -3–0)
B.E.: -7.1 MMOL/L (ref -3–0)
BILIRUB SERPL-MCNC: 0.5 MG/DL (ref 0–1.2)
BILIRUB SERPL-MCNC: 0.7 MG/DL (ref 0–1.2)
BILIRUBIN DIRECT: <0.2 MG/DL (ref 0–0.3)
BILIRUBIN, INDIRECT: ABNORMAL MG/DL (ref 0–1)
BUN BLDV-MCNC: 20 MG/DL (ref 6–23)
BUN BLDV-MCNC: 21 MG/DL (ref 6–23)
CALCIUM SERPL-MCNC: 7.9 MG/DL (ref 8.6–10.2)
CALCIUM SERPL-MCNC: 8.3 MG/DL (ref 8.6–10.2)
CARDIOPULMONARY BYPASS: YES
CHLORIDE BLD-SCNC: 108 MMOL/L (ref 98–107)
CHLORIDE BLD-SCNC: 109 MMOL/L (ref 98–107)
CO2: 20 MMOL/L (ref 22–29)
CO2: 21 MMOL/L (ref 22–29)
CREAT SERPL-MCNC: 1.2 MG/DL (ref 0.7–1.2)
CREAT SERPL-MCNC: 1.2 MG/DL (ref 0.7–1.2)
DELIVERY SYSTEMS: ABNORMAL
DEVICE: ABNORMAL
EPL-TEG: 0 % (ref 0–15)
FIO2 ARTERIAL: 50
G-TEG: 7.5 K D/SC (ref 4.5–11)
GFR AFRICAN AMERICAN: >60
GFR NON-AFRICAN AMERICAN: >60 ML/MIN/1.73
GFR NON-AFRICAN AMERICAN: >60 ML/MIN/1.73
GFR, ESTIMATED: 51 ML/MIN/1.73
GFR, ESTIMATED: 51 ML/MIN/1.73
GFR, ESTIMATED: 56 ML/MIN/1.73
GFR, ESTIMATED: 56 ML/MIN/1.73
GLUCOSE BLD-MCNC: 202 MG/DL (ref 74–99)
GLUCOSE BLD-MCNC: 225 MG/DL (ref 74–99)
GLUCOSE BLD-MCNC: 235 MG/DL (ref 74–99)
GLUCOSE BLD-MCNC: 241 MG/DL (ref 74–99)
GLUCOSE BLD-MCNC: 247 MG/DL (ref 74–99)
GLUCOSE BLD-MCNC: 261 MG/DL (ref 74–99)
HCO3 ARTERIAL: 18.6 MMOL/L (ref 22–26)
HCO3 ARTERIAL: 19 MMOL/L (ref 22–26)
HCO3 ARTERIAL: 20.1 MMOL/L (ref 22–26)
HCO3 ARTERIAL: 20.1 MMOL/L (ref 22–26)
HCO3 ARTERIAL: 20.2 MMOL/L (ref 22–26)
HCO3 ARTERIAL: 20.8 MMOL/L (ref 22–26)
HCO3 ARTERIAL: 21.2 MMOL/L (ref 22–26)
HCO3 ARTERIAL: 21.3 MMOL/L (ref 22–26)
HCO3 ARTERIAL: 21.4 MMOL/L (ref 22–26)
HCO3 ARTERIAL: 22.1 MMOL/L (ref 22–26)
HCO3 ARTERIAL: 22.9 MMOL/L (ref 22–26)
HCO3 ARTERIAL: 23.5 MMOL/L (ref 22–26)
HCO3 ARTERIAL: 23.7 MMOL/L (ref 22–26)
HCO3 ARTERIAL: 24.9 MMOL/L (ref 22–26)
HCO3 ARTERIAL: 25.1 MMOL/L (ref 22–26)
HCT (EST): 27 % (ref 37–54)
HCT (EST): 27 % (ref 37–54)
HCT (EST): 30 % (ref 37–54)
HCT (EST): 32 % (ref 37–54)
HCT (EST): 37 % (ref 37–54)
HCT (EST): 38 % (ref 37–54)
HCT (EST): 40 % (ref 37–54)
HCT (EST): 40 % (ref 37–54)
HCT (EST): 42 % (ref 37–54)
HCT (EST): 43 % (ref 37–54)
HCT (EST): 44 % (ref 37–54)
HCT (EST): 45 % (ref 37–54)
HCT (EST): 46 % (ref 37–54)
HCT VFR BLD CALC: 38.3 % (ref 37–54)
HCT VFR BLD CALC: 44.6 % (ref 37–54)
HEMOGLOBIN: 12 G/DL (ref 12.5–16.5)
HEMOGLOBIN: 14.3 G/DL (ref 12.5–16.5)
HGB, (EST): 10.2 G/DL (ref 12.5–15.5)
HGB, (EST): 11 G/DL (ref 12.5–15.5)
HGB, (EST): 12.8 G/DL (ref 12.5–15.5)
HGB, (EST): 13 G/DL (ref 12.5–15.5)
HGB, (EST): 13.5 G/DL (ref 12.5–15.5)
HGB, (EST): 13.5 G/DL (ref 12.5–15.5)
HGB, (EST): 14.4 G/DL (ref 12.5–15.5)
HGB, (EST): 14.5 G/DL (ref 12.5–15.5)
HGB, (EST): 14.7 G/DL (ref 12.5–15.5)
HGB, (EST): 14.8 G/DL (ref 12.5–15.5)
HGB, (EST): 14.8 G/DL (ref 12.5–15.5)
HGB, (EST): 15.3 G/DL (ref 12.5–15.5)
HGB, (EST): 15.7 G/DL (ref 12.5–15.5)
HGB, (EST): 9.2 G/DL (ref 12.5–15.5)
HGB, (EST): 9.3 G/DL (ref 12.5–15.5)
INR BLD: 1.8
K (CLOTTING TIME): 2.1 MIN (ref 1–3)
LACTIC ACID: 4 MMOL/L (ref 0.5–2.2)
LACTIC ACID: 4.1 MMOL/L (ref 0.5–2.2)
LACTIC ACID: 5.5 MMOL/L (ref 0.5–2.2)
LACTIC ACID: 6.3 MMOL/L (ref 0.5–2.2)
LACTIC ACID: 6.5 MMOL/L (ref 0.5–2.2)
LACTIC ACID: 6.6 MMOL/L (ref 0.5–2.2)
LACTIC ACID: 6.8 MMOL/L (ref 0.5–2.2)
LACTIC ACID: 7.1 MMOL/L (ref 0.5–2.2)
LY30 (FIBRINOLYSIS): 0 % (ref 0–8)
MA (MAX AMPLITUDE): 60 MM (ref 50–70)
MAGNESIUM: 2.1 MG/DL (ref 1.6–2.6)
MCH RBC QN AUTO: 30.6 PG (ref 26–35)
MCH RBC QN AUTO: 31.2 PG (ref 26–35)
MCHC RBC AUTO-ENTMCNC: 31.3 % (ref 32–34.5)
MCHC RBC AUTO-ENTMCNC: 32.1 % (ref 32–34.5)
MCV RBC AUTO: 97.4 FL (ref 80–99.9)
MCV RBC AUTO: 97.7 FL (ref 80–99.9)
METER GLUCOSE: 173 MG/DL (ref 74–99)
METER GLUCOSE: 179 MG/DL (ref 74–99)
METER GLUCOSE: 183 MG/DL (ref 74–99)
METER GLUCOSE: 184 MG/DL (ref 74–99)
METER GLUCOSE: 189 MG/DL (ref 74–99)
METER GLUCOSE: 190 MG/DL (ref 74–99)
METER GLUCOSE: 203 MG/DL (ref 74–99)
METER GLUCOSE: 204 MG/DL (ref 74–99)
METER GLUCOSE: 205 MG/DL (ref 74–99)
METER GLUCOSE: 214 MG/DL (ref 74–99)
METER GLUCOSE: 218 MG/DL (ref 74–99)
METER GLUCOSE: 242 MG/DL (ref 74–99)
METER GLUCOSE: 247 MG/DL (ref 74–99)
METER GLUCOSE: 253 MG/DL (ref 74–99)
METER GLUCOSE: 255 MG/DL (ref 74–99)
METER GLUCOSE: 95 MG/DL (ref 74–99)
MODE: AC
O2 SATURATION: 100 % (ref 92–98.5)
O2 SATURATION: 100 % (ref 92–98.5)
O2 SATURATION: 76.9 % (ref 92–98.5)
O2 SATURATION: 77.5 % (ref 92–98.5)
O2 SATURATION: 79 % (ref 92–98.5)
O2 SATURATION: 79.4 % (ref 92–98.5)
O2 SATURATION: 87 % (ref 92–98.5)
O2 SATURATION: 91.3 % (ref 92–98.5)
O2 SATURATION: 93.1 % (ref 92–98.5)
O2 SATURATION: 93.9 % (ref 92–98.5)
O2 SATURATION: 94.5 % (ref 92–98.5)
O2 SATURATION: 96 % (ref 92–98.5)
O2 SATURATION: 97.8 % (ref 92–98.5)
O2 SATURATION: 98.1 % (ref 92–98.5)
O2 SATURATION: 99.6 % (ref 92–98.5)
OPERATOR ID: 187
OPERATOR ID: 187
OPERATOR ID: 3095
OPERATOR ID: 3095
OPERATOR ID: 475
OPERATOR ID: 7278
OPERATOR ID: 7278
OPERATOR ID: ABNORMAL
PATIENT TEMP: 37
PATIENT TEMP: 37
PCO2 (TEMP CORRECTED): 54.3 MMHG (ref 35–45)
PCO2 (TEMP CORRECTED): 57.7 MMHG (ref 35–45)
PCO2 ARTERIAL: 37 MMHG (ref 35–45)
PCO2 ARTERIAL: 37.8 MMHG (ref 35–45)
PCO2 ARTERIAL: 40.8 MMHG (ref 35–45)
PCO2 ARTERIAL: 42.5 MMHG (ref 35–45)
PCO2 ARTERIAL: 42.9 MMHG (ref 35–45)
PCO2 ARTERIAL: 43.5 MMHG (ref 35–45)
PCO2 ARTERIAL: 43.9 MMHG (ref 35–45)
PCO2 ARTERIAL: 44.2 MMHG (ref 35–45)
PCO2 ARTERIAL: 45.8 MMHG (ref 35–45)
PCO2 ARTERIAL: 45.8 MMHG (ref 35–45)
PCO2 ARTERIAL: 46 MMHG (ref 35–45)
PCO2 ARTERIAL: 57.4 MMHG (ref 35–45)
PCO2 ARTERIAL: 63.8 MMHG (ref 35–45)
PDW BLD-RTO: 16.1 FL (ref 11.5–15)
PDW BLD-RTO: 16.1 FL (ref 11.5–15)
PH (TEMPERATURE CORRECTED): 7.23 (ref 7.35–7.45)
PH (TEMPERATURE CORRECTED): 7.25 (ref 7.35–7.45)
PH BLOOD GAS: 7.2 (ref 7.35–7.45)
PH BLOOD GAS: 7.22 (ref 7.35–7.45)
PH BLOOD GAS: 7.27 (ref 7.35–7.45)
PH BLOOD GAS: 7.27 (ref 7.35–7.45)
PH BLOOD GAS: 7.28 (ref 7.35–7.45)
PH BLOOD GAS: 7.3 (ref 7.35–7.45)
PH BLOOD GAS: 7.31 (ref 7.35–7.45)
PH BLOOD GAS: 7.31 (ref 7.35–7.45)
PH BLOOD GAS: 7.32 (ref 7.35–7.45)
PH BLOOD GAS: 7.34 (ref 7.35–7.45)
PLATELET # BLD: 154 E9/L (ref 130–450)
PLATELET # BLD: 177 E9/L (ref 130–450)
PMV BLD AUTO: 10.6 FL (ref 7–12)
PMV BLD AUTO: 11.2 FL (ref 7–12)
PO2 (TEMP CORRECTED): 49.8 MMHG (ref 60–80)
PO2 (TEMP CORRECTED): 80.5 MMHG (ref 60–80)
PO2 ARTERIAL: 109.8 MMHG (ref 60–80)
PO2 ARTERIAL: 115.9 MMHG (ref 60–80)
PO2 ARTERIAL: 202.2 MMHG (ref 60–80)
PO2 ARTERIAL: 428.5 MMHG (ref 60–80)
PO2 ARTERIAL: 446.1 MMHG (ref 60–80)
PO2 ARTERIAL: 49.2 MMHG (ref 60–80)
PO2 ARTERIAL: 49.6 MMHG (ref 60–80)
PO2 ARTERIAL: 51.8 MMHG (ref 60–80)
PO2 ARTERIAL: 60.4 MMHG (ref 60–80)
PO2 ARTERIAL: 75.1 MMHG (ref 60–80)
PO2 ARTERIAL: 79.1 MMHG (ref 60–80)
PO2 ARTERIAL: 82.2 MMHG (ref 60–80)
PO2 ARTERIAL: 90.2 MMHG (ref 60–80)
POC BUN: 18 MG/DL (ref 8–23)
POC BUN: 20 MG/DL (ref 8–23)
POC BUN: 22 MG/DL (ref 8–23)
POC BUN: 23 MG/DL (ref 8–23)
POC CHLORIDE: 100 MMOL/L (ref 100–108)
POC CHLORIDE: 104 MMOL/L (ref 100–108)
POC CHLORIDE: 106 MMOL/L (ref 100–108)
POC CHLORIDE: 109 MMOL/L (ref 100–108)
POC CO2: 19.9 MMOL/L (ref 22–29)
POC CO2: 22.4 MMOL/L (ref 22–29)
POC CO2: 22.9 MMOL/L (ref 22–29)
POC CO2: 24.3 MMOL/L (ref 22–29)
POC CREATININE: 1.3 MG/DL (ref 0.7–1.2)
POC CREATININE: 1.3 MG/DL (ref 0.7–1.2)
POC CREATININE: 1.4 MG/DL (ref 0.7–1.2)
POC CREATININE: 1.4 MG/DL (ref 0.7–1.2)
POC IONIZED CALCIUM: 1.1
POC IONIZED CALCIUM: 1.1
POC IONIZED CALCIUM: 1.3
POC IONIZED CALCIUM: 1.4
POC LACTIC ACID: 1.4
POC LACTIC ACID: 1.6
POC LACTIC ACID: 2.3
POC LACTIC ACID: 3.4
POC SODIUM: 134 MMOL/L (ref 132–146)
POC SODIUM: 136 MMOL/L (ref 132–146)
POC SODIUM: 140 MMOL/L (ref 132–146)
POC SODIUM: 143 MMOL/L (ref 132–146)
POSITIVE END EXP PRESS: 8 CMH2O
POTASSIUM SERPL-SCNC: 2.8 MMOL/L (ref 3.5–5.5)
POTASSIUM SERPL-SCNC: 3 MMOL/L (ref 3.5–5)
POTASSIUM SERPL-SCNC: 3.2 MMOL/L (ref 3.5–5)
POTASSIUM SERPL-SCNC: 3.3 MMOL/L (ref 3.5–5.5)
POTASSIUM SERPL-SCNC: 3.6 MMOL/L (ref 3.5–5)
POTASSIUM SERPL-SCNC: 4.5 MMOL/L (ref 3.5–5.5)
POTASSIUM SERPL-SCNC: 5.2 MMOL/L (ref 3.5–5.5)
PRESSURE SUPPORT: 8 CMH2O
PROTHROMBIN TIME: 19.2 SEC (ref 9.3–12.4)
R (REACTION TIME): 4.2 MIN (ref 5–10)
RBC # BLD: 3.92 E12/L (ref 3.8–5.8)
RBC # BLD: 4.58 E12/L (ref 3.8–5.8)
RESPIRATORY RATE: 18 B/MIN
RESPIRATORY RATE: 18 B/MIN
RESPIRATORY RATE: 22 B/MIN
RESPIRATORY RATE: 22 B/MIN
RESPIRATORY RATE: 24 B/MIN
SODIUM BLD-SCNC: 140 MMOL/L (ref 132–146)
SODIUM BLD-SCNC: 141 MMOL/L (ref 132–146)
SOURCE, BLOOD GAS: ABNORMAL
TIDAL VOLUME: 500 ML
TIDAL VOLUME: 500 ML
TIDAL VOLUME: 550 ML
TIDAL VOLUME: 550 ML
TIDAL VOLUME: 600 ML
TOTAL PROTEIN: 4.6 G/DL (ref 6.4–8.3)
TOTAL PROTEIN: 5.1 G/DL (ref 6.4–8.3)
WBC # BLD: 32.1 E9/L (ref 4.5–11.5)
WBC # BLD: 34.5 E9/L (ref 4.5–11.5)

## 2021-08-30 PROCEDURE — 94002 VENT MGMT INPAT INIT DAY: CPT

## 2021-08-30 PROCEDURE — 3600000018 HC SURGERY OHS ADDTL 15MIN: Performed by: THORACIC SURGERY (CARDIOTHORACIC VASCULAR SURGERY)

## 2021-08-30 PROCEDURE — 94640 AIRWAY INHALATION TREATMENT: CPT

## 2021-08-30 PROCEDURE — 2000000000 HC ICU R&B

## 2021-08-30 PROCEDURE — 2709999900 HC NON-CHARGEABLE SUPPLY: Performed by: THORACIC SURGERY (CARDIOTHORACIC VASCULAR SURGERY)

## 2021-08-30 PROCEDURE — 02QG0ZZ REPAIR MITRAL VALVE, OPEN APPROACH: ICD-10-PCS | Performed by: THORACIC SURGERY (CARDIOTHORACIC VASCULAR SURGERY)

## 2021-08-30 PROCEDURE — C1729 CATH, DRAINAGE: HCPCS | Performed by: THORACIC SURGERY (CARDIOTHORACIC VASCULAR SURGERY)

## 2021-08-30 PROCEDURE — A4648 IMPLANTABLE TISSUE MARKER: HCPCS | Performed by: THORACIC SURGERY (CARDIOTHORACIC VASCULAR SURGERY)

## 2021-08-30 PROCEDURE — 85027 COMPLETE CBC AUTOMATED: CPT

## 2021-08-30 PROCEDURE — 021009W BYPASS CORONARY ARTERY, ONE ARTERY FROM AORTA WITH AUTOLOGOUS VENOUS TISSUE, OPEN APPROACH: ICD-10-PCS | Performed by: THORACIC SURGERY (CARDIOTHORACIC VASCULAR SURGERY)

## 2021-08-30 PROCEDURE — 80053 COMPREHEN METABOLIC PANEL: CPT

## 2021-08-30 PROCEDURE — 6370000000 HC RX 637 (ALT 250 FOR IP)

## 2021-08-30 PROCEDURE — 71045 X-RAY EXAM CHEST 1 VIEW: CPT

## 2021-08-30 PROCEDURE — 2500000003 HC RX 250 WO HCPCS

## 2021-08-30 PROCEDURE — P9045 ALBUMIN (HUMAN), 5%, 250 ML: HCPCS | Performed by: THORACIC SURGERY (CARDIOTHORACIC VASCULAR SURGERY)

## 2021-08-30 PROCEDURE — APPSS60 APP SPLIT SHARED TIME 46-60 MINUTES: Performed by: NURSE PRACTITIONER

## 2021-08-30 PROCEDURE — 6360000002 HC RX W HCPCS

## 2021-08-30 PROCEDURE — 83605 ASSAY OF LACTIC ACID: CPT

## 2021-08-30 PROCEDURE — 6360000002 HC RX W HCPCS: Performed by: THORACIC SURGERY (CARDIOTHORACIC VASCULAR SURGERY)

## 2021-08-30 PROCEDURE — 2720000010 HC SURG SUPPLY STERILE: Performed by: THORACIC SURGERY (CARDIOTHORACIC VASCULAR SURGERY)

## 2021-08-30 PROCEDURE — 2580000003 HC RX 258: Performed by: PHYSICIAN ASSISTANT

## 2021-08-30 PROCEDURE — 85730 THROMBOPLASTIN TIME PARTIAL: CPT

## 2021-08-30 PROCEDURE — 6360000002 HC RX W HCPCS: Performed by: PHYSICIAN ASSISTANT

## 2021-08-30 PROCEDURE — 6360000002 HC RX W HCPCS: Performed by: NURSE PRACTITIONER

## 2021-08-30 PROCEDURE — 2580000003 HC RX 258: Performed by: THORACIC SURGERY (CARDIOTHORACIC VASCULAR SURGERY)

## 2021-08-30 PROCEDURE — C9113 INJ PANTOPRAZOLE SODIUM, VIA: HCPCS | Performed by: THORACIC SURGERY (CARDIOTHORACIC VASCULAR SURGERY)

## 2021-08-30 PROCEDURE — 5A1221Z PERFORMANCE OF CARDIAC OUTPUT, CONTINUOUS: ICD-10-PCS | Performed by: THORACIC SURGERY (CARDIOTHORACIC VASCULAR SURGERY)

## 2021-08-30 PROCEDURE — 2500000003 HC RX 250 WO HCPCS: Performed by: NURSE PRACTITIONER

## 2021-08-30 PROCEDURE — 33517 CABG ARTERY-VEIN SINGLE: CPT | Performed by: PHYSICIAN ASSISTANT

## 2021-08-30 PROCEDURE — 7100000000 HC PACU RECOVERY - FIRST 15 MIN

## 2021-08-30 PROCEDURE — 84132 ASSAY OF SERUM POTASSIUM: CPT

## 2021-08-30 PROCEDURE — 6370000000 HC RX 637 (ALT 250 FOR IP): Performed by: THORACIC SURGERY (CARDIOTHORACIC VASCULAR SURGERY)

## 2021-08-30 PROCEDURE — 33405 REPLACEMENT AORTIC VALVE OPN: CPT | Performed by: PHYSICIAN ASSISTANT

## 2021-08-30 PROCEDURE — 33425 REPAIR OF MITRAL VALVE: CPT | Performed by: THORACIC SURGERY (CARDIOTHORACIC VASCULAR SURGERY)

## 2021-08-30 PROCEDURE — 7100000001 HC PACU RECOVERY - ADDTL 15 MIN

## 2021-08-30 PROCEDURE — 3700000001 HC ADD 15 MINUTES (ANESTHESIA): Performed by: THORACIC SURGERY (CARDIOTHORACIC VASCULAR SURGERY)

## 2021-08-30 PROCEDURE — 82803 BLOOD GASES ANY COMBINATION: CPT

## 2021-08-30 PROCEDURE — 33533 CABG ARTERIAL SINGLE: CPT | Performed by: PHYSICIAN ASSISTANT

## 2021-08-30 PROCEDURE — 35211 RPR BLVSL DIR NTRATHRC W/BYP: CPT | Performed by: PHYSICIAN ASSISTANT

## 2021-08-30 PROCEDURE — 85347 COAGULATION TIME ACTIVATED: CPT

## 2021-08-30 PROCEDURE — B246ZZ4 ULTRASONOGRAPHY OF RIGHT AND LEFT HEART, TRANSESOPHAGEAL: ICD-10-PCS | Performed by: THORACIC SURGERY (CARDIOTHORACIC VASCULAR SURGERY)

## 2021-08-30 PROCEDURE — 82962 GLUCOSE BLOOD TEST: CPT

## 2021-08-30 PROCEDURE — 3600000008 HC SURGERY OHS BASE: Performed by: THORACIC SURGERY (CARDIOTHORACIC VASCULAR SURGERY)

## 2021-08-30 PROCEDURE — 85610 PROTHROMBIN TIME: CPT

## 2021-08-30 PROCEDURE — 80048 BASIC METABOLIC PNL TOTAL CA: CPT

## 2021-08-30 PROCEDURE — 2580000003 HC RX 258: Performed by: NURSE PRACTITIONER

## 2021-08-30 PROCEDURE — C1713 ANCHOR/SCREW BN/BN,TIS/BN: HCPCS | Performed by: THORACIC SURGERY (CARDIOTHORACIC VASCULAR SURGERY)

## 2021-08-30 PROCEDURE — 33405 REPLACEMENT AORTIC VALVE OPN: CPT | Performed by: THORACIC SURGERY (CARDIOTHORACIC VASCULAR SURGERY)

## 2021-08-30 PROCEDURE — 2580000003 HC RX 258

## 2021-08-30 PROCEDURE — 85576 BLOOD PLATELET AGGREGATION: CPT

## 2021-08-30 PROCEDURE — 94664 DEMO&/EVAL PT USE INHALER: CPT

## 2021-08-30 PROCEDURE — 02RF08Z REPLACEMENT OF AORTIC VALVE WITH ZOOPLASTIC TISSUE, OPEN APPROACH: ICD-10-PCS | Performed by: THORACIC SURGERY (CARDIOTHORACIC VASCULAR SURGERY)

## 2021-08-30 PROCEDURE — 33533 CABG ARTERIAL SINGLE: CPT | Performed by: THORACIC SURGERY (CARDIOTHORACIC VASCULAR SURGERY)

## 2021-08-30 PROCEDURE — 88311 DECALCIFY TISSUE: CPT

## 2021-08-30 PROCEDURE — 85384 FIBRINOGEN ACTIVITY: CPT

## 2021-08-30 PROCEDURE — 88305 TISSUE EXAM BY PATHOLOGIST: CPT

## 2021-08-30 PROCEDURE — 06BQ4ZZ EXCISION OF LEFT SAPHENOUS VEIN, PERCUTANEOUS ENDOSCOPIC APPROACH: ICD-10-PCS | Performed by: THORACIC SURGERY (CARDIOTHORACIC VASCULAR SURGERY)

## 2021-08-30 PROCEDURE — 02100Z9 BYPASS CORONARY ARTERY, ONE ARTERY FROM LEFT INTERNAL MAMMARY, OPEN APPROACH: ICD-10-PCS | Performed by: THORACIC SURGERY (CARDIOTHORACIC VASCULAR SURGERY)

## 2021-08-30 PROCEDURE — 2780000006 HC MISC HEART VALVE: Performed by: THORACIC SURGERY (CARDIOTHORACIC VASCULAR SURGERY)

## 2021-08-30 PROCEDURE — 33425 REPAIR OF MITRAL VALVE: CPT | Performed by: PHYSICIAN ASSISTANT

## 2021-08-30 PROCEDURE — 36415 COLL VENOUS BLD VENIPUNCTURE: CPT

## 2021-08-30 PROCEDURE — 99291 CRITICAL CARE FIRST HOUR: CPT | Performed by: NURSE PRACTITIONER

## 2021-08-30 PROCEDURE — 33508 ENDOSCOPIC VEIN HARVEST: CPT | Performed by: THORACIC SURGERY (CARDIOTHORACIC VASCULAR SURGERY)

## 2021-08-30 PROCEDURE — 02L70CK OCCLUSION OF LEFT ATRIAL APPENDAGE WITH EXTRALUMINAL DEVICE, OPEN APPROACH: ICD-10-PCS | Performed by: THORACIC SURGERY (CARDIOTHORACIC VASCULAR SURGERY)

## 2021-08-30 PROCEDURE — 3700000000 HC ANESTHESIA ATTENDED CARE: Performed by: THORACIC SURGERY (CARDIOTHORACIC VASCULAR SURGERY)

## 2021-08-30 PROCEDURE — 99233 SBSQ HOSP IP/OBS HIGH 50: CPT | Performed by: INTERNAL MEDICINE

## 2021-08-30 PROCEDURE — 83735 ASSAY OF MAGNESIUM: CPT

## 2021-08-30 PROCEDURE — 37799 UNLISTED PX VASCULAR SURGERY: CPT

## 2021-08-30 PROCEDURE — 80076 HEPATIC FUNCTION PANEL: CPT

## 2021-08-30 PROCEDURE — 33517 CABG ARTERY-VEIN SINGLE: CPT | Performed by: THORACIC SURGERY (CARDIOTHORACIC VASCULAR SURGERY)

## 2021-08-30 PROCEDURE — 35211 RPR BLVSL DIR NTRATHRC W/BYP: CPT | Performed by: THORACIC SURGERY (CARDIOTHORACIC VASCULAR SURGERY)

## 2021-08-30 DEVICE — SCREW BNE L13MM DIA2.3MM THOR STRNL TI LOK DRL FREE LEV 1: Type: IMPLANTABLE DEVICE | Site: STERNUM | Status: FUNCTIONAL

## 2021-08-30 DEVICE — PLATE BONE 1.8MM THICKNESS STRNL LCK 6 H CP TI: Type: IMPLANTABLE DEVICE | Site: STERNUM | Status: FUNCTIONAL

## 2021-08-30 DEVICE — DEVICE OCCL CLP L35MM PLUNG GRP FLX SHFT FOR GILLINOV: Type: IMPLANTABLE DEVICE | Site: HEART | Status: FUNCTIONAL

## 2021-08-30 DEVICE — PLATE LCK STRNL 8-H LAD T 1.8MM: Type: IMPLANTABLE DEVICE | Site: STERNUM | Status: FUNCTIONAL

## 2021-08-30 DEVICE — VALVE AORT SZ 27MM BOV PERICARD VFIT TECHNOLOGY HRT VLV REPL: Type: IMPLANTABLE DEVICE | Site: HEART | Status: FUNCTIONAL

## 2021-08-30 DEVICE — SCREW BNE L15MM DIA2.3MM THOR STRNL TI LOK DRL FREE LEV 1: Type: IMPLANTABLE DEVICE | Site: STERNUM | Status: FUNCTIONAL

## 2021-08-30 RX ORDER — CHLORHEXIDINE GLUCONATE 0.12 MG/ML
RINSE ORAL
Status: COMPLETED
Start: 2021-08-30 | End: 2021-08-30

## 2021-08-30 RX ORDER — HEPARIN SODIUM 10000 [USP'U]/ML
INJECTION, SOLUTION INTRAVENOUS; SUBCUTANEOUS PRN
Status: DISCONTINUED | OUTPATIENT
Start: 2021-08-30 | End: 2021-08-30 | Stop reason: SDUPTHER

## 2021-08-30 RX ORDER — ALBUMIN, HUMAN INJ 5% 5 %
25 SOLUTION INTRAVENOUS ONCE
Status: COMPLETED | OUTPATIENT
Start: 2021-08-30 | End: 2021-08-30

## 2021-08-30 RX ORDER — SODIUM CHLORIDE, SODIUM LACTATE, POTASSIUM CHLORIDE, CALCIUM CHLORIDE 600; 310; 30; 20 MG/100ML; MG/100ML; MG/100ML; MG/100ML
INJECTION, SOLUTION INTRAVENOUS CONTINUOUS PRN
Status: DISCONTINUED | OUTPATIENT
Start: 2021-08-30 | End: 2021-08-30 | Stop reason: SDUPTHER

## 2021-08-30 RX ORDER — PROPOFOL 10 MG/ML
10 INJECTION, EMULSION INTRAVENOUS CONTINUOUS PRN
Status: DISCONTINUED | OUTPATIENT
Start: 2021-08-30 | End: 2021-08-31

## 2021-08-30 RX ORDER — DEXTROSE MONOHYDRATE 50 MG/ML
100 INJECTION, SOLUTION INTRAVENOUS PRN
Status: DISCONTINUED | OUTPATIENT
Start: 2021-08-30 | End: 2021-09-07 | Stop reason: HOSPADM

## 2021-08-30 RX ORDER — CHLORHEXIDINE GLUCONATE 0.12 MG/ML
15 RINSE ORAL 2 TIMES DAILY
Status: DISCONTINUED | OUTPATIENT
Start: 2021-08-30 | End: 2021-08-31

## 2021-08-30 RX ORDER — CALCIUM CHLORIDE 100 MG/ML
INJECTION INTRAVENOUS; INTRAVENTRICULAR PRN
Status: DISCONTINUED | OUTPATIENT
Start: 2021-08-30 | End: 2021-08-30 | Stop reason: SDUPTHER

## 2021-08-30 RX ORDER — ATORVASTATIN CALCIUM 40 MG/1
40 TABLET, FILM COATED ORAL NIGHTLY
Status: DISCONTINUED | OUTPATIENT
Start: 2021-08-30 | End: 2021-09-07 | Stop reason: HOSPADM

## 2021-08-30 RX ORDER — ASPIRIN 81 MG/1
81 TABLET ORAL DAILY
Status: DISCONTINUED | OUTPATIENT
Start: 2021-08-31 | End: 2021-09-02

## 2021-08-30 RX ORDER — CHLORHEXIDINE GLUCONATE 4 G/100ML
SOLUTION TOPICAL ONCE
Status: DISCONTINUED | OUTPATIENT
Start: 2021-08-30 | End: 2021-08-30 | Stop reason: HOSPADM

## 2021-08-30 RX ORDER — PANTOPRAZOLE SODIUM 40 MG/1
40 TABLET, DELAYED RELEASE ORAL DAILY
Status: DISCONTINUED | OUTPATIENT
Start: 2021-08-31 | End: 2021-09-02

## 2021-08-30 RX ORDER — SODIUM CHLORIDE 0.9 % (FLUSH) 0.9 %
10 SYRINGE (ML) INJECTION PRN
Status: DISCONTINUED | OUTPATIENT
Start: 2021-08-30 | End: 2021-09-07 | Stop reason: HOSPADM

## 2021-08-30 RX ORDER — POTASSIUM CHLORIDE 7.45 MG/ML
INJECTION INTRAVENOUS PRN
Status: DISCONTINUED | OUTPATIENT
Start: 2021-08-30 | End: 2021-08-30 | Stop reason: SDUPTHER

## 2021-08-30 RX ORDER — ASPIRIN 300 MG/1
300 SUPPOSITORY RECTAL ONCE
Status: COMPLETED | OUTPATIENT
Start: 2021-08-30 | End: 2021-08-30

## 2021-08-30 RX ORDER — VECURONIUM BROMIDE 1 MG/ML
INJECTION, POWDER, LYOPHILIZED, FOR SOLUTION INTRAVENOUS PRN
Status: DISCONTINUED | OUTPATIENT
Start: 2021-08-30 | End: 2021-08-30 | Stop reason: SDUPTHER

## 2021-08-30 RX ORDER — SODIUM CHLORIDE 0.9 % (FLUSH) 0.9 %
10 SYRINGE (ML) INJECTION EVERY 12 HOURS SCHEDULED
Status: DISCONTINUED | OUTPATIENT
Start: 2021-08-30 | End: 2021-08-30 | Stop reason: HOSPADM

## 2021-08-30 RX ORDER — SODIUM CHLORIDE 9 MG/ML
INJECTION, SOLUTION INTRAVENOUS CONTINUOUS PRN
Status: DISCONTINUED | OUTPATIENT
Start: 2021-08-30 | End: 2021-08-30 | Stop reason: SDUPTHER

## 2021-08-30 RX ORDER — MIDAZOLAM HYDROCHLORIDE 1 MG/ML
INJECTION INTRAMUSCULAR; INTRAVENOUS PRN
Status: DISCONTINUED | OUTPATIENT
Start: 2021-08-30 | End: 2021-08-30 | Stop reason: SDUPTHER

## 2021-08-30 RX ORDER — MEPERIDINE HYDROCHLORIDE 50 MG/ML
25 INJECTION INTRAMUSCULAR; INTRAVENOUS; SUBCUTANEOUS
Status: ACTIVE | OUTPATIENT
Start: 2021-08-30 | End: 2021-08-30

## 2021-08-30 RX ORDER — FENTANYL CITRATE 0.05 MG/ML
INJECTION, SOLUTION INTRAMUSCULAR; INTRAVENOUS PRN
Status: DISCONTINUED | OUTPATIENT
Start: 2021-08-30 | End: 2021-08-30 | Stop reason: SDUPTHER

## 2021-08-30 RX ORDER — FENTANYL CITRATE 50 UG/ML
25 INJECTION, SOLUTION INTRAMUSCULAR; INTRAVENOUS
Status: DISCONTINUED | OUTPATIENT
Start: 2021-08-30 | End: 2021-09-02

## 2021-08-30 RX ORDER — SODIUM CHLORIDE 9 MG/ML
INJECTION, SOLUTION INTRAVENOUS CONTINUOUS
Status: DISCONTINUED | OUTPATIENT
Start: 2021-08-30 | End: 2021-08-30

## 2021-08-30 RX ORDER — 0.9 % SODIUM CHLORIDE 0.9 %
250 INTRAVENOUS SOLUTION INTRAVENOUS CONTINUOUS PRN
Status: DISCONTINUED | OUTPATIENT
Start: 2021-08-30 | End: 2021-09-02

## 2021-08-30 RX ORDER — NICOTINE POLACRILEX 4 MG
15 LOZENGE BUCCAL PRN
Status: DISCONTINUED | OUTPATIENT
Start: 2021-08-30 | End: 2021-09-07 | Stop reason: HOSPADM

## 2021-08-30 RX ORDER — LIDOCAINE HYDROCHLORIDE 20 MG/ML
INJECTION, SOLUTION INTRAVENOUS PRN
Status: DISCONTINUED | OUTPATIENT
Start: 2021-08-30 | End: 2021-08-30

## 2021-08-30 RX ORDER — PANTOPRAZOLE SODIUM 40 MG/10ML
40 INJECTION, POWDER, LYOPHILIZED, FOR SOLUTION INTRAVENOUS DAILY
Status: COMPLETED | OUTPATIENT
Start: 2021-08-30 | End: 2021-08-30

## 2021-08-30 RX ORDER — SODIUM CHLORIDE 0.9 % (FLUSH) 0.9 %
10 SYRINGE (ML) INJECTION PRN
Status: DISCONTINUED | OUTPATIENT
Start: 2021-08-30 | End: 2021-08-30 | Stop reason: HOSPADM

## 2021-08-30 RX ORDER — FENTANYL CITRATE 50 UG/ML
50 INJECTION, SOLUTION INTRAMUSCULAR; INTRAVENOUS
Status: DISCONTINUED | OUTPATIENT
Start: 2021-08-30 | End: 2021-09-02

## 2021-08-30 RX ORDER — INSULIN GLARGINE 100 [IU]/ML
0.15 INJECTION, SOLUTION SUBCUTANEOUS NIGHTLY
Status: DISCONTINUED | OUTPATIENT
Start: 2021-08-31 | End: 2021-09-01

## 2021-08-30 RX ORDER — ACETAMINOPHEN 325 MG/1
650 TABLET ORAL EVERY 4 HOURS PRN
Status: DISCONTINUED | OUTPATIENT
Start: 2021-08-30 | End: 2021-09-02

## 2021-08-30 RX ORDER — VASOPRESSIN 20 U/ML
INJECTION PARENTERAL PRN
Status: DISCONTINUED | OUTPATIENT
Start: 2021-08-30 | End: 2021-08-30 | Stop reason: SDUPTHER

## 2021-08-30 RX ORDER — SENNA AND DOCUSATE SODIUM 50; 8.6 MG/1; MG/1
1 TABLET, FILM COATED ORAL 2 TIMES DAILY
Status: DISCONTINUED | OUTPATIENT
Start: 2021-08-30 | End: 2021-09-02

## 2021-08-30 RX ORDER — DEXTROSE MONOHYDRATE 25 G/50ML
12.5 INJECTION, SOLUTION INTRAVENOUS PRN
Status: DISCONTINUED | OUTPATIENT
Start: 2021-08-30 | End: 2021-09-07 | Stop reason: HOSPADM

## 2021-08-30 RX ORDER — PROPOFOL 10 MG/ML
INJECTION, EMULSION INTRAVENOUS
Status: COMPLETED
Start: 2021-08-30 | End: 2021-08-30

## 2021-08-30 RX ORDER — TAMSULOSIN HYDROCHLORIDE 0.4 MG/1
0.4 CAPSULE ORAL DAILY
Status: DISCONTINUED | OUTPATIENT
Start: 2021-08-31 | End: 2021-09-07

## 2021-08-30 RX ORDER — OXYCODONE HYDROCHLORIDE 10 MG/1
10 TABLET ORAL EVERY 4 HOURS PRN
Status: DISCONTINUED | OUTPATIENT
Start: 2021-08-30 | End: 2021-09-02

## 2021-08-30 RX ORDER — SODIUM CHLORIDE 0.9 % (FLUSH) 0.9 %
10 SYRINGE (ML) INJECTION EVERY 12 HOURS SCHEDULED
Status: DISCONTINUED | OUTPATIENT
Start: 2021-08-30 | End: 2021-09-07 | Stop reason: HOSPADM

## 2021-08-30 RX ORDER — IPRATROPIUM BROMIDE AND ALBUTEROL SULFATE 2.5; .5 MG/3ML; MG/3ML
1 SOLUTION RESPIRATORY (INHALATION)
Status: DISCONTINUED | OUTPATIENT
Start: 2021-08-30 | End: 2021-09-07 | Stop reason: HOSPADM

## 2021-08-30 RX ORDER — SODIUM CHLORIDE 9 MG/ML
25 INJECTION, SOLUTION INTRAVENOUS PRN
Status: DISCONTINUED | OUTPATIENT
Start: 2021-08-30 | End: 2021-09-02

## 2021-08-30 RX ORDER — DOBUTAMINE HYDROCHLORIDE 400 MG/100ML
0.5 INJECTION INTRAVENOUS CONTINUOUS
Status: DISCONTINUED | OUTPATIENT
Start: 2021-08-30 | End: 2021-09-04

## 2021-08-30 RX ORDER — SODIUM CHLORIDE 9 MG/ML
10 INJECTION INTRAVENOUS DAILY
Status: COMPLETED | OUTPATIENT
Start: 2021-08-30 | End: 2021-08-30

## 2021-08-30 RX ORDER — ACETAMINOPHEN 650 MG/1
650 SUPPOSITORY RECTAL EVERY 4 HOURS PRN
Status: DISCONTINUED | OUTPATIENT
Start: 2021-08-30 | End: 2021-09-02

## 2021-08-30 RX ORDER — CHLORHEXIDINE GLUCONATE 0.12 MG/ML
15 RINSE ORAL ONCE
Status: COMPLETED | OUTPATIENT
Start: 2021-08-30 | End: 2021-08-30

## 2021-08-30 RX ORDER — MILRINONE LACTATE 1 MG/ML
INJECTION, SOLUTION INTRAVENOUS PRN
Status: DISCONTINUED | OUTPATIENT
Start: 2021-08-30 | End: 2021-08-30 | Stop reason: SDUPTHER

## 2021-08-30 RX ORDER — SODIUM CHLORIDE 9 MG/ML
30 INJECTION, SOLUTION INTRAVENOUS CONTINUOUS
Status: DISCONTINUED | OUTPATIENT
Start: 2021-08-30 | End: 2021-09-02

## 2021-08-30 RX ORDER — PROTAMINE SULFATE 10 MG/ML
INJECTION, SOLUTION INTRAVENOUS PRN
Status: DISCONTINUED | OUTPATIENT
Start: 2021-08-30 | End: 2021-08-30 | Stop reason: SDUPTHER

## 2021-08-30 RX ORDER — OXYCODONE HYDROCHLORIDE 5 MG/1
5 TABLET ORAL EVERY 4 HOURS PRN
Status: DISCONTINUED | OUTPATIENT
Start: 2021-08-30 | End: 2021-09-02

## 2021-08-30 RX ORDER — POTASSIUM CHLORIDE 29.8 MG/ML
20 INJECTION INTRAVENOUS PRN
Status: DISCONTINUED | OUTPATIENT
Start: 2021-08-30 | End: 2021-09-02

## 2021-08-30 RX ORDER — ETOMIDATE 2 MG/ML
INJECTION INTRAVENOUS PRN
Status: DISCONTINUED | OUTPATIENT
Start: 2021-08-30 | End: 2021-08-30 | Stop reason: SDUPTHER

## 2021-08-30 RX ORDER — MAGNESIUM SULFATE IN WATER 40 MG/ML
2000 INJECTION, SOLUTION INTRAVENOUS PRN
Status: DISCONTINUED | OUTPATIENT
Start: 2021-08-30 | End: 2021-09-02

## 2021-08-30 RX ORDER — ONDANSETRON 2 MG/ML
4 INJECTION INTRAMUSCULAR; INTRAVENOUS EVERY 8 HOURS PRN
Status: DISCONTINUED | OUTPATIENT
Start: 2021-08-30 | End: 2021-09-01

## 2021-08-30 RX ORDER — ALBUMIN, HUMAN INJ 5% 5 %
25 SOLUTION INTRAVENOUS PRN
Status: DISCONTINUED | OUTPATIENT
Start: 2021-08-30 | End: 2021-09-02

## 2021-08-30 RX ORDER — ALBUMIN, HUMAN INJ 5% 5 %
SOLUTION INTRAVENOUS
Status: DISPENSED
Start: 2021-08-30 | End: 2021-08-30

## 2021-08-30 RX ORDER — AMINOCAPROIC ACID 250 MG/ML
INJECTION, SOLUTION INTRAVENOUS PRN
Status: DISCONTINUED | OUTPATIENT
Start: 2021-08-30 | End: 2021-08-30 | Stop reason: SDUPTHER

## 2021-08-30 RX ORDER — SODIUM CHLORIDE 9 MG/ML
25 INJECTION, SOLUTION INTRAVENOUS PRN
Status: DISCONTINUED | OUTPATIENT
Start: 2021-08-30 | End: 2021-08-30 | Stop reason: HOSPADM

## 2021-08-30 RX ORDER — DOBUTAMINE HYDROCHLORIDE 400 MG/100ML
INJECTION INTRAVENOUS CONTINUOUS PRN
Status: DISCONTINUED | OUTPATIENT
Start: 2021-08-30 | End: 2021-08-30 | Stop reason: SDUPTHER

## 2021-08-30 RX ADMIN — PANTOPRAZOLE SODIUM 40 MG: 40 INJECTION, POWDER, FOR SOLUTION INTRAVENOUS at 13:52

## 2021-08-30 RX ADMIN — INSULIN HUMAN 12 UNITS: 100 INJECTION, SOLUTION PARENTERAL at 10:04

## 2021-08-30 RX ADMIN — FENTANYL CITRATE 250 MCG: 50 INJECTION, SOLUTION INTRAMUSCULAR; INTRAVENOUS at 08:11

## 2021-08-30 RX ADMIN — POTASSIUM CHLORIDE 20 MEQ: 400 INJECTION, SOLUTION INTRAVENOUS at 19:44

## 2021-08-30 RX ADMIN — CEFAZOLIN 3000 MG: 10 INJECTION, POWDER, FOR SOLUTION INTRAVENOUS at 07:15

## 2021-08-30 RX ADMIN — VECURONIUM BROMIDE 20 MG: 10 INJECTION, POWDER, LYOPHILIZED, FOR SOLUTION INTRAVENOUS at 07:01

## 2021-08-30 RX ADMIN — SODIUM BICARBONATE 100 MEQ: 84 INJECTION, SOLUTION INTRAVENOUS at 18:37

## 2021-08-30 RX ADMIN — VASOPRESSIN 1 UNITS: 20 INJECTION INTRAVENOUS at 08:31

## 2021-08-30 RX ADMIN — PROPOFOL 30 MCG/KG/MIN: 10 INJECTION, EMULSION INTRAVENOUS at 18:16

## 2021-08-30 RX ADMIN — MIDAZOLAM 1 MG: 1 INJECTION INTRAMUSCULAR; INTRAVENOUS at 06:45

## 2021-08-30 RX ADMIN — POTASSIUM CHLORIDE 20 MEQ: 400 INJECTION, SOLUTION INTRAVENOUS at 13:20

## 2021-08-30 RX ADMIN — IPRATROPIUM BROMIDE AND ALBUTEROL SULFATE 1 AMPULE: .5; 2.5 SOLUTION RESPIRATORY (INHALATION) at 20:58

## 2021-08-30 RX ADMIN — EPINEPHRINE 0.1 MCG/KG/MIN: 1 INJECTION PARENTERAL at 10:15

## 2021-08-30 RX ADMIN — SODIUM CHLORIDE 24.31 UNITS/HR: 9 INJECTION, SOLUTION INTRAVENOUS at 22:09

## 2021-08-30 RX ADMIN — SODIUM CHLORIDE 30 ML/HR: 9 INJECTION, SOLUTION INTRAVENOUS at 14:05

## 2021-08-30 RX ADMIN — Medication 100 MEQ: at 18:37

## 2021-08-30 RX ADMIN — SODIUM CHLORIDE 4 UNITS/HR: 9 INJECTION, SOLUTION INTRAVENOUS at 09:32

## 2021-08-30 RX ADMIN — INSULIN HUMAN 12 UNITS: 100 INJECTION, SOLUTION PARENTERAL at 11:01

## 2021-08-30 RX ADMIN — EPINEPHRINE 14.5 MCG/MIN: 1 INJECTION INTRAMUSCULAR; INTRAVENOUS; SUBCUTANEOUS at 23:11

## 2021-08-30 RX ADMIN — SODIUM CHLORIDE 12 UNITS/HR: 9 INJECTION, SOLUTION INTRAVENOUS at 12:34

## 2021-08-30 RX ADMIN — CEFAZOLIN 3000 MG: 10 INJECTION, POWDER, FOR SOLUTION INTRAVENOUS at 18:39

## 2021-08-30 RX ADMIN — SODIUM CHLORIDE, POTASSIUM CHLORIDE, SODIUM LACTATE AND CALCIUM CHLORIDE: 600; 310; 30; 20 INJECTION, SOLUTION INTRAVENOUS at 06:28

## 2021-08-30 RX ADMIN — VASOPRESSIN 0.1 UNITS/MIN: 20 INJECTION INTRAVENOUS at 10:16

## 2021-08-30 RX ADMIN — VASOPRESSIN 0.5 UNITS: 20 INJECTION INTRAVENOUS at 08:53

## 2021-08-30 RX ADMIN — POTASSIUM CHLORIDE 20 MEQ: 400 INJECTION, SOLUTION INTRAVENOUS at 22:57

## 2021-08-30 RX ADMIN — MIDAZOLAM 3 MG: 1 INJECTION INTRAMUSCULAR; INTRAVENOUS at 07:01

## 2021-08-30 RX ADMIN — AMINOCAPROIC ACID 1 G/HR: 250 INJECTION, SOLUTION INTRAVENOUS at 07:06

## 2021-08-30 RX ADMIN — MILRINONE LACTATE 6000 MCG: 1 INJECTION, SOLUTION INTRAVENOUS at 07:17

## 2021-08-30 RX ADMIN — 0.12% CHLORHEXIDINE GLUCONATE 15 ML: 1.2 RINSE ORAL at 13:52

## 2021-08-30 RX ADMIN — PROTAMINE SULFATE 400 MG: 10 INJECTION, SOLUTION INTRAVENOUS at 10:37

## 2021-08-30 RX ADMIN — HYDROCORTISONE SODIUM SUCCINATE 100 MG: 100 INJECTION, POWDER, FOR SOLUTION INTRAMUSCULAR; INTRAVENOUS at 14:44

## 2021-08-30 RX ADMIN — SODIUM CHLORIDE, PRESERVATIVE FREE 10 ML: 5 INJECTION INTRAVENOUS at 13:53

## 2021-08-30 RX ADMIN — SODIUM CHLORIDE, PRESERVATIVE FREE 10 ML: 5 INJECTION INTRAVENOUS at 20:57

## 2021-08-30 RX ADMIN — FENTANYL CITRATE 50 MCG: 50 INJECTION, SOLUTION INTRAMUSCULAR; INTRAVENOUS at 15:09

## 2021-08-30 RX ADMIN — CHLORHEXIDINE GLUCONATE 15 ML: 0.12 RINSE ORAL at 06:05

## 2021-08-30 RX ADMIN — POTASSIUM CHLORIDE 20 MEQ: 400 INJECTION, SOLUTION INTRAVENOUS at 18:38

## 2021-08-30 RX ADMIN — SODIUM BICARBONATE 50 MEQ: 84 INJECTION, SOLUTION INTRAVENOUS at 11:38

## 2021-08-30 RX ADMIN — VASOPRESSIN 0.5 UNITS: 20 INJECTION INTRAVENOUS at 08:41

## 2021-08-30 RX ADMIN — DOBUTAMINE HYDROCHLORIDE 5 MCG/KG/MIN: 400 INJECTION INTRAVENOUS at 10:16

## 2021-08-30 RX ADMIN — EPINEPHRINE 16 MCG/MIN: 1 INJECTION INTRAMUSCULAR; INTRAVENOUS; SUBCUTANEOUS at 17:13

## 2021-08-30 RX ADMIN — ETOMIDATE 14 MG: 2 INJECTION, SOLUTION INTRAVENOUS at 07:01

## 2021-08-30 RX ADMIN — Medication 50 MCG/HR: at 14:01

## 2021-08-30 RX ADMIN — AMINOCAPROIC ACID 5000 MG: 250 INJECTION, SOLUTION INTRAVENOUS at 07:01

## 2021-08-30 RX ADMIN — VECURONIUM BROMIDE 10 MG: 10 INJECTION, POWDER, LYOPHILIZED, FOR SOLUTION INTRAVENOUS at 09:33

## 2021-08-30 RX ADMIN — PROPOFOL 10 MCG/KG/MIN: 10 INJECTION, EMULSION INTRAVENOUS at 11:50

## 2021-08-30 RX ADMIN — PROPOFOL 25 MCG/KG/MIN: 10 INJECTION, EMULSION INTRAVENOUS at 22:00

## 2021-08-30 RX ADMIN — CALCIUM GLUCONATE 1000 MG: 98 INJECTION, SOLUTION INTRAVENOUS at 14:31

## 2021-08-30 RX ADMIN — MUPIROCIN: 20 OINTMENT TOPICAL at 14:31

## 2021-08-30 RX ADMIN — HYDROCORTISONE SODIUM SUCCINATE 100 MG: 100 INJECTION, POWDER, FOR SOLUTION INTRAMUSCULAR; INTRAVENOUS at 22:30

## 2021-08-30 RX ADMIN — ALBUMIN (HUMAN) 25 G: 12.5 INJECTION, SOLUTION INTRAVENOUS at 19:17

## 2021-08-30 RX ADMIN — VECURONIUM BROMIDE 10 MG: 10 INJECTION, POWDER, LYOPHILIZED, FOR SOLUTION INTRAVENOUS at 10:25

## 2021-08-30 RX ADMIN — VASOPRESSIN 0.09 UNITS/MIN: 20 INJECTION INTRAVENOUS at 12:15

## 2021-08-30 RX ADMIN — HEPARIN SODIUM 50000 UNITS: 10000 INJECTION INTRAVENOUS; SUBCUTANEOUS at 08:38

## 2021-08-30 RX ADMIN — FENTANYL CITRATE 250 MCG: 50 INJECTION, SOLUTION INTRAMUSCULAR; INTRAVENOUS at 07:01

## 2021-08-30 RX ADMIN — VASOPRESSIN 0.5 UNITS: 20 INJECTION INTRAVENOUS at 08:35

## 2021-08-30 RX ADMIN — 0.12% CHLORHEXIDINE GLUCONATE 15 ML: 1.2 RINSE ORAL at 06:05

## 2021-08-30 RX ADMIN — VASOPRESSIN 0.5 UNITS: 20 INJECTION INTRAVENOUS at 08:45

## 2021-08-30 RX ADMIN — VASOPRESSIN 0.09 UNITS/MIN: 20 INJECTION INTRAVENOUS at 15:49

## 2021-08-30 RX ADMIN — CALCIUM CHLORIDE 1 G: 100 INJECTION, SOLUTION INTRAVENOUS at 10:40

## 2021-08-30 RX ADMIN — EPINEPHRINE 16 MCG/MIN: 1 INJECTION INTRAMUSCULAR; INTRAVENOUS; SUBCUTANEOUS at 12:15

## 2021-08-30 RX ADMIN — POTASSIUM CHLORIDE 10 MEQ: 10 INJECTION, SOLUTION INTRAVENOUS at 11:40

## 2021-08-30 RX ADMIN — INSULIN HUMAN 4 UNITS: 100 INJECTION, SOLUTION PARENTERAL at 09:30

## 2021-08-30 RX ADMIN — VASOPRESSIN 0.09 UNITS/MIN: 20 INJECTION INTRAVENOUS at 19:45

## 2021-08-30 RX ADMIN — SODIUM CHLORIDE: 9 INJECTION, SOLUTION INTRAVENOUS at 06:29

## 2021-08-30 RX ADMIN — CEFAZOLIN 3000 MG: 10 INJECTION, POWDER, FOR SOLUTION INTRAVENOUS at 10:58

## 2021-08-30 RX ADMIN — FENTANYL CITRATE 750 MCG: 50 INJECTION, SOLUTION INTRAMUSCULAR; INTRAVENOUS at 08:00

## 2021-08-30 RX ADMIN — 0.12% CHLORHEXIDINE GLUCONATE 15 ML: 1.2 RINSE ORAL at 20:56

## 2021-08-30 RX ADMIN — ASPIRIN 300 MG: 300 SUPPOSITORY RECTAL at 13:53

## 2021-08-30 RX ADMIN — MUPIROCIN: 20 OINTMENT TOPICAL at 20:57

## 2021-08-30 RX ADMIN — IPRATROPIUM BROMIDE AND ALBUTEROL SULFATE 1 AMPULE: .5; 2.5 SOLUTION RESPIRATORY (INHALATION) at 15:27

## 2021-08-30 RX ADMIN — SODIUM CHLORIDE: 9 INJECTION, SOLUTION INTRAVENOUS at 06:05

## 2021-08-30 RX ADMIN — AMIODARONE HYDROCHLORIDE 0.5 MG/MIN: 50 INJECTION, SOLUTION INTRAVENOUS at 12:30

## 2021-08-30 ASSESSMENT — PULMONARY FUNCTION TESTS
PIF_VALUE: 27
PIF_VALUE: 1
PIF_VALUE: 29
PIF_VALUE: 1
PIF_VALUE: 29
PIF_VALUE: 1
PIF_VALUE: 33
PIF_VALUE: 26
PIF_VALUE: 29
PIF_VALUE: 0
PIF_VALUE: 25
PIF_VALUE: 1
PIF_VALUE: 29
PIF_VALUE: 30
PIF_VALUE: 25
PIF_VALUE: 28
PIF_VALUE: 0
PIF_VALUE: 1
PIF_VALUE: 26
PIF_VALUE: 0
PIF_VALUE: 3
PIF_VALUE: 30
PIF_VALUE: 28
PIF_VALUE: 29
PIF_VALUE: 27
PIF_VALUE: 25
PIF_VALUE: 1
PIF_VALUE: 30
PIF_VALUE: 0
PIF_VALUE: 28
PIF_VALUE: 26
PIF_VALUE: 29
PIF_VALUE: 9
PIF_VALUE: 23
PIF_VALUE: 1
PIF_VALUE: 31
PIF_VALUE: 28
PIF_VALUE: 26
PIF_VALUE: 29
PIF_VALUE: 0
PIF_VALUE: 0
PIF_VALUE: 25
PIF_VALUE: 3
PIF_VALUE: 28
PIF_VALUE: 1
PIF_VALUE: 0
PIF_VALUE: 25
PIF_VALUE: 1
PIF_VALUE: 30
PIF_VALUE: 30
PIF_VALUE: 0
PIF_VALUE: 0
PIF_VALUE: 26
PIF_VALUE: 28
PIF_VALUE: 26
PIF_VALUE: 26
PIF_VALUE: 0
PIF_VALUE: 1
PIF_VALUE: 30
PIF_VALUE: 29
PIF_VALUE: 29
PIF_VALUE: 26
PIF_VALUE: 29
PIF_VALUE: 25
PIF_VALUE: 1
PIF_VALUE: 29
PIF_VALUE: 1
PIF_VALUE: 2
PIF_VALUE: 27
PIF_VALUE: 28
PIF_VALUE: 26
PIF_VALUE: 1
PIF_VALUE: 29
PIF_VALUE: 27
PIF_VALUE: 28
PIF_VALUE: 30
PIF_VALUE: 2
PIF_VALUE: 25
PIF_VALUE: 27
PIF_VALUE: 6
PIF_VALUE: 0
PIF_VALUE: 28
PIF_VALUE: 0
PIF_VALUE: 29
PIF_VALUE: 0
PIF_VALUE: 0
PIF_VALUE: 29
PIF_VALUE: 1
PIF_VALUE: 1
PIF_VALUE: 0
PIF_VALUE: 0
PIF_VALUE: 4
PIF_VALUE: 26
PIF_VALUE: 28
PIF_VALUE: 0
PIF_VALUE: 28
PIF_VALUE: 0
PIF_VALUE: 29
PIF_VALUE: 27
PIF_VALUE: 1
PIF_VALUE: 24
PIF_VALUE: 1
PIF_VALUE: 30
PIF_VALUE: 29
PIF_VALUE: 0
PIF_VALUE: 25
PIF_VALUE: 0
PIF_VALUE: 28
PIF_VALUE: 1
PIF_VALUE: 26
PIF_VALUE: 1
PIF_VALUE: 24
PIF_VALUE: 0
PIF_VALUE: 28
PIF_VALUE: 28
PIF_VALUE: 26
PIF_VALUE: 31
PIF_VALUE: 29
PIF_VALUE: 29
PIF_VALUE: 27
PIF_VALUE: 0
PIF_VALUE: 1
PIF_VALUE: 25
PIF_VALUE: 0
PIF_VALUE: 29
PIF_VALUE: 23
PIF_VALUE: 30
PIF_VALUE: 0
PIF_VALUE: 30
PIF_VALUE: 31
PIF_VALUE: 1
PIF_VALUE: 26
PIF_VALUE: 1
PIF_VALUE: 8
PIF_VALUE: 1
PIF_VALUE: 29
PIF_VALUE: 26
PIF_VALUE: 29
PIF_VALUE: 26
PIF_VALUE: 29
PIF_VALUE: 29
PIF_VALUE: 30
PIF_VALUE: 31
PIF_VALUE: 0
PIF_VALUE: 0
PIF_VALUE: 1
PIF_VALUE: 25
PIF_VALUE: 30
PIF_VALUE: 31
PIF_VALUE: 0
PIF_VALUE: 25
PIF_VALUE: 2
PIF_VALUE: 29
PIF_VALUE: 0
PIF_VALUE: 0
PIF_VALUE: 1
PIF_VALUE: 31
PIF_VALUE: 1
PIF_VALUE: 1
PIF_VALUE: 0
PIF_VALUE: 25
PIF_VALUE: 30
PIF_VALUE: 27
PIF_VALUE: 1
PIF_VALUE: 1
PIF_VALUE: 24
PIF_VALUE: 26
PIF_VALUE: 28
PIF_VALUE: 0
PIF_VALUE: 5
PIF_VALUE: 30
PIF_VALUE: 29
PIF_VALUE: 27
PIF_VALUE: 25
PIF_VALUE: 1
PIF_VALUE: 29
PIF_VALUE: 23
PIF_VALUE: 26
PIF_VALUE: 29
PIF_VALUE: 27
PIF_VALUE: 0
PIF_VALUE: 29
PIF_VALUE: 26
PIF_VALUE: 1
PIF_VALUE: 25
PIF_VALUE: 31
PIF_VALUE: 1
PIF_VALUE: 30
PIF_VALUE: 26
PIF_VALUE: 28
PIF_VALUE: 1
PIF_VALUE: 28
PIF_VALUE: 26
PIF_VALUE: 27
PIF_VALUE: 25
PIF_VALUE: 30
PIF_VALUE: 26
PIF_VALUE: 0
PIF_VALUE: 27
PIF_VALUE: 27
PIF_VALUE: 1
PIF_VALUE: 29
PIF_VALUE: 26
PIF_VALUE: 28
PIF_VALUE: 29
PIF_VALUE: 1
PIF_VALUE: 23
PIF_VALUE: 24
PIF_VALUE: 29
PIF_VALUE: 3
PIF_VALUE: 10
PIF_VALUE: 0
PIF_VALUE: 1
PIF_VALUE: 27
PIF_VALUE: 28
PIF_VALUE: 1
PIF_VALUE: 24
PIF_VALUE: 26
PIF_VALUE: 1
PIF_VALUE: 1
PIF_VALUE: 29
PIF_VALUE: 28
PIF_VALUE: 23
PIF_VALUE: 26
PIF_VALUE: 0
PIF_VALUE: 30
PIF_VALUE: 24
PIF_VALUE: 29
PIF_VALUE: 31
PIF_VALUE: 27
PIF_VALUE: 28
PIF_VALUE: 2
PIF_VALUE: 1
PIF_VALUE: 1
PIF_VALUE: 23
PIF_VALUE: 0
PIF_VALUE: 27
PIF_VALUE: 29
PIF_VALUE: 1
PIF_VALUE: 28
PIF_VALUE: 2
PIF_VALUE: 29
PIF_VALUE: 0
PIF_VALUE: 26
PIF_VALUE: 29
PIF_VALUE: 0
PIF_VALUE: 1
PIF_VALUE: 0
PIF_VALUE: 26
PIF_VALUE: 30
PIF_VALUE: 0
PIF_VALUE: 28
PIF_VALUE: 0
PIF_VALUE: 30
PIF_VALUE: 0
PIF_VALUE: 28
PIF_VALUE: 30
PIF_VALUE: 29
PIF_VALUE: 27
PIF_VALUE: 1
PIF_VALUE: 29
PIF_VALUE: 29
PIF_VALUE: 28
PIF_VALUE: 1
PIF_VALUE: 0
PIF_VALUE: 3
PIF_VALUE: 29
PIF_VALUE: 28
PIF_VALUE: 1
PIF_VALUE: 29
PIF_VALUE: 30
PIF_VALUE: 27
PIF_VALUE: 26
PIF_VALUE: 0
PIF_VALUE: 26
PIF_VALUE: 1
PIF_VALUE: 26
PIF_VALUE: 26
PIF_VALUE: 29
PIF_VALUE: 32
PIF_VALUE: 29
PIF_VALUE: 25
PIF_VALUE: 29
PIF_VALUE: 26
PIF_VALUE: 0
PIF_VALUE: 25
PIF_VALUE: 0
PIF_VALUE: 1
PIF_VALUE: 24
PIF_VALUE: 26
PIF_VALUE: 24
PIF_VALUE: 30
PIF_VALUE: 30
PIF_VALUE: 26
PIF_VALUE: 28
PIF_VALUE: 28
PIF_VALUE: 29
PIF_VALUE: 28
PIF_VALUE: 1
PIF_VALUE: 0
PIF_VALUE: 28
PIF_VALUE: 24
PIF_VALUE: 29
PIF_VALUE: 30
PIF_VALUE: 28
PIF_VALUE: 26
PIF_VALUE: 0
PIF_VALUE: 0
PIF_VALUE: 1
PIF_VALUE: 28
PIF_VALUE: 0

## 2021-08-30 ASSESSMENT — PAIN SCALES - GENERAL
PAINLEVEL_OUTOF10: 1
PAINLEVEL_OUTOF10: 0
PAINLEVEL_OUTOF10: 3
PAINLEVEL_OUTOF10: 0
PAINLEVEL_OUTOF10: 0

## 2021-08-30 ASSESSMENT — PAIN - FUNCTIONAL ASSESSMENT: PAIN_FUNCTIONAL_ASSESSMENT: 0-10

## 2021-08-30 ASSESSMENT — LIFESTYLE VARIABLES: SMOKING_STATUS: 0

## 2021-08-30 NOTE — PROGRESS NOTES
Patient admitted to CVICU post CABGx2 and AVR. Patient has a swan in place, mediastinal and pleural chest tubes, and is intubated and sedated. Currently has atrial and ventricular wires that are connected to the pacer box with a ventricular back up rate of 60bpm on VVI. Patient is currently unresponsive post anesthesia at this time. Will continue to monitor.

## 2021-08-30 NOTE — ANESTHESIA PROCEDURE NOTES
Procedure Performed: GABRIELE     Start Time:        End Time:      Preanesthesia Checklist:  Patient identified, IV assessed, risks and benefits discussed, monitors and equipment assessed, procedure being performed at surgeon's request and anesthesia consent obtained. General Procedure Information  Diagnostic Indications for Echo:  assessment of surgical repair, hemodynamic monitoring and assessment of valve function  Physician Requesting Echo: David Oshea MD  Location performed:  OR  Intubated  Bite block placed  Heart visualized  Probe Insertion:  Easy  Probe Type:  3D and mulitplane  Modalities:  3D, color flow mapping, pulse wave Doppler and continuous wave Doppler    Echocardiographic and Doppler Measurements    Ventricles    Right Ventricle:  Cavity size dilated. Hypertrophy not present. Thrombus not present. Global function mildly impaired. Left Ventricle:  Cavity size dilated. Hypertrophy not present. Thrombus not present. Global Function severely impaired. Ejection Fraction 15%. Ventricular Regional Function:    Wall Motion Comments:       Anterolateral wall hypokinetic rest of LV akinetic    Valves    Aortic Valve: Annulus normal.  Stenosis severe. Regurgitation none. Leaflets bicuspid. Leaflet motions restricted. Mitral Valve: Annulus normal.  Stenosis not present. Regurgitation mild. Leaflets normal.  Leaflet motions normal.      Tricuspid Valve: Annulus normal.  Stenosis not present. Regurgitation mild. Leaflets normal.  Leaflet motions normal.    Pulmonic Valve: Annulus normal.  Stenosis not present. Regurgitation mild. Aorta    Ascending Aorta:  Size normal.  Dissection not present. Aortic Arch:  Size normal.  Dissection not present. Descending Aorta:  Size normal.  Dissection not present. Other Aortic Findings:       Tor Maha grade 1 atheroma throughout the aorta    Atria    Right Atrium:  Size normal.  Spontaneous echo contrast not present.   Thrombus not present. Tumor not present. Device not present. Left Atrium:  Size normal.  Spontaneous echo contrast not present. Thrombus not present. Tumor not present. Device not present. Left atrial appendage normal.      Septa    Atrial Septum:  Intra-atrial septal morphology normal.      Ventricular Septum:  Intra-ventricular septum morphology normal.          Other Findings  Pericardium:  normal  Pleural Effusion:  none  Pulmonary Arteries:  normal  Pulmonary Venous Flow:  blunted (decreased) systolic flow    Anesthesia Information  Performed Personally  Anesthesiologist:  Tammi Forte,       Echocardiogram Comments:       S/p AVR, CABG x 2. LVEF ~ 20% RVEF mildly reduced. New AV seated well. No regurgitation. Velocity < 3m/s. MG ~ 8. EOAi = 1.15.   All findings d/w surgeon

## 2021-08-30 NOTE — PROGRESS NOTES
Patient came to CVICU with cell savor running- had received 1 L in OR and started a second liter when patient arrived. Second liter finished at 1425.

## 2021-08-30 NOTE — PROGRESS NOTES
Patient is signed out of anesthesia. Currently following all commands and moving all extremities. Patient remains intubated and sedated.  Will continue to monitor

## 2021-08-30 NOTE — ANESTHESIA PROCEDURE NOTES
Arterial Line:    An arterial line was placed using surface landmarks, in the OR for the following indication(s): continuous blood pressure monitoring and blood sampling needed. A 20 gauge (size), 5 cm (length), Arrow (type) catheter was placed, Seldinger technique not used, into the right brachial artery, secured by tape. Anesthesia type: Local    Events:  patient tolerated procedure well with no complications.   Anesthesiologist: Azalea Merlin, DO  Resident/CRNA: SHIRA Grey - CRNA  Other anesthesia staff: Vlad Stinson RN  Performed: Resident/CRNA and Other anesthesia staff   Preanesthetic Checklist  Completed: patient identified, IV checked, site marked, risks and benefits discussed, surgical consent, monitors and equipment checked, pre-op evaluation, timeout performed, anesthesia consent given, oxygen available and patient being monitored

## 2021-08-30 NOTE — PROGRESS NOTES
Pts family was not with pt this morning, pts belongings were locked in pre-op area.  Dorita Rossi RN

## 2021-08-30 NOTE — ANESTHESIA PROCEDURE NOTES
Central Venous Line:    A central venous line was placed using ultrasound guidance, in the OR for the following indication(s): central venous access. Sterility preparation included the following: hand hygiene performed prior to procedure, maximum sterile barriers used and sterile technique used to drape from head to toe. The patient was placed in Trendelenburg position. The right internal jugular vein was prepped. The site was prepped with Chloraprep. A 8.5 Fr (size), introducer double lumen was placed. During the procedure, the following specific steps were taken: target vein identified, needle advanced into vein and blood aspirated and guidewire advanced into vein. Intravenous verification was obtained by ultrasound and venous blood return. Post insertion care included: all ports aspirated, all ports flushed easily, guidewire removed intact, Biopatch applied, line sutured in place and dressing applied. During the procedure the patient experienced: patient tolerated procedure well with no complications. Anesthesia type: localA(n) non-oximetric, 7.5 (size) Pulmonary Artery Catheter (PAC) was placed through the Introducer CVL in the right internal jugular vein. The PAC placement was confirmed by pressure tracing changes and GABRIELE. The patient experienced the following events during the procedure: patient tolerated procedure well with no complications. PA Cath placed?: Yes  Staffing  Performed: Anesthesiologist   Anesthesiologist: Michelle Gutierrez DO  Preanesthetic Checklist  Completed: patient identified, IV checked, site marked, risks and benefits discussed, surgical consent, monitors and equipment checked, pre-op evaluation, timeout performed, anesthesia consent given, oxygen available and patient being monitored

## 2021-08-30 NOTE — LETTER
Discharge patient (Order [id number])  Discharge  Date: 9/7/2021 Department: [facility] 1 Ordering/Authorizing: JASMINE Caldwell   Order Information    Order Date/Time Release Date/Time Start Date/Time End Date/Time   09/07/21 01:01 PM None 09/07/21 01:00 PM 09/07/21 01:00 PM   Order Details    Frequency Duration Priority Order Class   ONE TIME 1  occurrence Routine Hospital Performed   Standing Order Information    Remaining Occurrences Interval Last Released     0/1 ONE TIME 9/7/2021            Released Orders      Released On Scheduled For Released By    1. 9/7/2021  1:01 PM 9/7/2021  1:00 PM JASMINE Caldwell (auto-released)

## 2021-08-30 NOTE — ANESTHESIA PRE PROCEDURE
Department of Anesthesiology  Preprocedure Note       Name:  Shar Prince   Age:  63 y.o.  :  1960                                          MRN:  [id number]         Date:  2021      Surgeon: Dr. Chong Mckeon    Procedure: AVR/CABG    Medications prior to admission:   Prior to Admission medications    Medication Sig Start Date End Date Taking?  Authorizing Provider   furosemide (LASIX) 40 MG tablet Take 1 tablet by mouth 2 times daily 21  Yes Rhoda Soliz MD   atorvastatin (LIPITOR) 40 MG tablet Take 1 tablet by mouth nightly 21  Yes El Bledsoe MD   spironolactone (ALDACTONE) 25 MG tablet Take 1 tablet by mouth daily 21  Yes El Bledsoe MD   metoprolol succinate (TOPROL XL) 50 MG extended release tablet Take 1 tablet by mouth daily 21  Yes El Bledsoe MD   amLODIPine (NORVASC) 10 MG tablet Take 10 mg by mouth daily   Yes Historical Provider, MD   insulin glargine (BASAGLAR KWIKPEN) 100 UNIT/ML injection pen Inject 50 Units into the skin nightly   Yes Historical Provider, MD   aspirin 81 MG EC tablet Take 81 mg by mouth daily   Yes Historical Provider, MD   lisinopril (PRINIVIL;ZESTRIL) 20 MG tablet Take 20 mg by mouth 2 times daily    Yes Historical Provider, MD   glyBURIDE (DIABETA) 5 MG tablet Take 5 mg by mouth 2 times daily     Historical Provider, MD       Current medications:    Current Facility-Administered Medications   Medication Dose Route Frequency Provider Last Rate Last Admin    0.9 % sodium chloride infusion   IntraVENous Continuous JASMINE Nance 100 mL/hr at 21 0605 New Bag at 21 0605    0.9 % sodium chloride infusion  25 mL IntraVENous PRN JASMINE Nance        ceFAZolin (ANCEF) 3,000 mg in dextrose 5 % 100 mL IVPB  3,000 mg IntraVENous On Call to 7800 JASMINE Easley        chlorhexidine (HIBICLENS) 4 % liquid   Topical Once Parul Nance        sodium chloride flush 0.9 % injection 10 mL  10 mL IntraVENous 2 times per day Sussy Hector Parul Peacock        sodium chloride flush 0.9 % injection 10 mL  10 mL IntraVENous PRN JASMINE Bobby           Allergies:  No Known Allergies    Problem List:    Patient Active Problem List   Diagnosis Code    Acute systolic congestive heart failure (HCC) I50.21    Class 2 severe obesity due to excess calories with serious comorbidity in adult [facility]) E66.01    Uncontrolled type 2 diabetes mellitus with hyperglycemia (HCC) E11.65    Essential hypertension I10    Severe aortic stenosis Q98.2    Acute systolic CHF (congestive heart failure) ([facility]) I50.21    Heart failure (HCC) I50.9    CAD in native artery I25.10       Past Medical History:        Diagnosis Date    CHF (congestive heart failure) ([facility])     Diabetes mellitus ([facility] [address]     Hypertension        Past Surgical History:        Procedure Laterality Date    CARDIAC CATHETERIZATION      HERNIA REPAIR      at age of 2    TRANSESOPHAGEAL ECHOCARDIOGRAM  2021       Social History:    Social History     Tobacco Use    Smoking status: Former Smoker     Packs/day: 1.50     Types: Cigarettes     Start date: 6/15/1973     Quit date: 6/15/2001     Years since quittin.2    Smokeless tobacco: Never Used   Substance Use Topics    Alcohol use: Yes     Comment: Average 10 beers/week sometimes has mixed drinks                                Counseling given: Not Answered      Vital Signs (Current):   Vitals:    21 0953 21 0531   BP:  (S) (!) 162/119   Pulse:  98   Resp:  18   Temp:  97 °F (36.1 °C)   TempSrc:  Temporal   SpO2:  94%   Weight: 275 lb (124.7 kg) 283 lb (128.4 kg)   Height: 6' 3\" (1.905 m) 6' 3\" (1.905 m)                                              BP Readings from Last 3 Encounters:   21 (S) (!) 162/119   21 (!) 150/108   08/10/21 (!) 151/90       NPO Status: Time of last liquid consumption: 2200>8 hrs                        Time of last solid consumption: 220                        Date of last liquid consumption: 08/29/21                        Date of last solid food consumption: 08/29/21    BMI:   Wt Readings from Last 3 Encounters:   08/30/21 283 lb (128.4 kg)   08/26/21 283 lb 3 oz (128.5 kg)   08/10/21 282 lb (127.9 kg)     Body mass index is 35.37 kg/m². CBC:   Lab Results   Component Value Date    WBC 8.9 08/26/2021    RBC 4.60 08/26/2021    HGB 14.2 08/26/2021    HCT 43.3 08/26/2021    MCV 94.1 08/26/2021    RDW 16.5 08/26/2021     08/26/2021       CMP:   Lab Results   Component Value Date     08/26/2021    K 3.6 08/26/2021    K 4.0 06/16/2021     08/26/2021    CO2 21 08/26/2021    BUN 17 08/26/2021    CREATININE 1.1 08/26/2021    GFRAA >60 08/26/2021    LABGLOM >60 08/26/2021    GLUCOSE 235 08/26/2021    GLUCOSE 259 04/13/2012    PROT 6.9 08/26/2021    CALCIUM 9.0 08/26/2021    BILITOT 0.7 08/26/2021    ALKPHOS 83 08/26/2021    AST 26 08/26/2021    ALT 24 08/26/2021       POC Tests: No results for input(s): POCGLU, POCNA, POCK, POCCL, POCBUN, POCHEMO, POCHCT in the last 72 hours.     Coags:   Lab Results   Component Value Date    PROTIME 14.5 08/26/2021    INR 1.3 08/26/2021    APTT 28.8 08/26/2021       HCG (If Applicable): No results found for: PREGTESTUR, PREGSERUM, HCG, HCGQUANT     ABGs: No results found for: PHART, PO2ART, CZD6ZMF, VRF6PPR, BEART, D4MSPFBZ     Type & Screen (If Applicable):  No results found for: LABABO, LABRH    Drug/Infectious Status (If Applicable):  No results found for: HIV, HEPCAB    COVID-19 Screening (If Applicable):   Lab Results   Component Value Date    COVID19 Not Detected 08/25/2021       EKG 12 Lead  Order: [id number]  Status:  Final result   Visible to patient:  No (not released) Next appt:  07/14/2021 at 09:15 AM in IP Unit [facility] CHF ROOM 1)   0 Result Notes   Ref Range & Units 6/17/21 0817   Ventricular Rate     Atrial Rate     P-R Interval ms 186    QRS Duration ms 132    Q-T Interval ms 396    QTc Calculation (Bazett) ms 510 P Axis degrees 51    R Axis degrees -54    T Axis degrees 97    Resulting Agency  [facility]      Narrative & Impression    Normal sinus rhythm with sinus arrhythmia and frequent PAC's  Left axis deviation  Left ventricular hypertrophy with QRS widening and repolarization abnormality  Cannot rule out anterior MI, age undetermined  Abnormal ECG  No previous ECGs available  Confirmed by Levy Gurrola ([id number]) on 6/17/2021 9:39:12 AM           ECHO 6/15/21  Summary   Left ventricle is moderately enlarged . Ejection fraction is visually estimated at 15-20%. Overall ejection fraction severely decreased . Severe global hypokinesis. Normal left ventricle wall thickness. Stage III diastolic dysfunction. Moderately dilated right ventricle. Right ventricle global systolic function is low normal . TAPSE 17 mm. There is severe low flow, low gradient aortic stenosis with valve area of   0.8 sq cm by continuity equation. Aortic valve peak velocity 2.8 m/s, mean   gradient 19 mmHg, DVI 0.22, SVI 15 ml/m2. Mild tricuspid regurgitation. RVSP is 58 mmHg. Pulmonary hypertension is moderate . No evidence for hemodynamically significant pericardial effusion. Anesthesia Evaluation  Patient summary reviewed and Nursing notes reviewed no history of anesthetic complications:   Airway: Mallampati: II  TM distance: <3 FB   Neck ROM: full   Dental:          Pulmonary: breath sounds clear to auscultation      (-) not a current smoker                           Cardiovascular:    (+) hypertension:, valvular problems/murmurs: AS, CAD:, CHF: systolic, hyperlipidemia      ECG reviewed  Rhythm: regular  Rate: normal  Echocardiogram reviewed    Cleared by cardiology     Beta Blocker:  Dose within 24 Hrs      ROS comment: ECHO 7/12/21  Summary   Ejection fraction is visually estimated at 20-25%. The aortic valve is bicuspid, with fusion of the right and left coronary   cusps.    Probably severe low-flow low-gradient aortic stenosis is present. Mean gradient 30 mmHg. YESI 0.8 cm2. YESI by planimetry 1.2 cm2. Ascending aorta and root are dilated. Neuro/Psych:   Negative Neuro/Psych ROS              GI/Hepatic/Renal:   (+) morbid obesity          Endo/Other:    (+) DiabetesType II DM, poorly controlled, , .                 Abdominal:             Vascular: negative vascular ROS. Other Findings:             Anesthesia Plan      general     ASA 4       Induction: intravenous. arterial line, central line, BIS, GABRIELE and PA catheter  MIPS: Postoperative opioids intended, Prophylactic antiemetics administered and Postoperative trial extubation. Anesthetic plan and risks discussed with patient. Use of blood products discussed with patient whom consented to blood products. Plan discussed with attending and CRNA.                 Stuart Mcdaniel DO   8/30/2021

## 2021-08-30 NOTE — PROGRESS NOTES
CVICU Admission Note    Name: Manish Yip  MRN: [id number]    CC: Postoperative Critical Care Management     Indication for Surgery/Procedure: AS, CAD, ICM, CHF    LVEF: severe ~20%   RVF:  Mild     Important/Relevant PMH/PSH: HFrEF, HTN, HLD, DMII, former smoker, ETOH      Procedure/Surgeries: 8/30/2021 Sternotomy/AVR with 27mm Mane Inspiris bioprosthetic/Mitral valve repair by decalcification of the anterior leaflet of the mitral valve/Reduction aortoplasty/CABG x 2 (LIMA-LAD, SVG-Diag 1)/DERRICK exclusion with 35 mm AtriClip/Rigid internal fixation of the sternum with KLS plates x 22 modifier    Pacing wires: Atrial and Ventricular       Physical Exam:    /62   Pulse 106   Temp 96 °F (35.6 °C)   Resp 20   Ht 6' 3\" (1.905 m)   Wt 283 lb (128.4 kg)   SpO2 94%   BMI 35.37 kg/m²     Recent Labs     08/30/21  1204   WBC 32.1*   RBC 3.92   HGB 12.0*   HCT 38.3   MCV 97.7   MCH 30.6   MCHC 31.3*   RDW 16.1*      MPV 10.6     Recent Labs     08/30/21  1133   K 2.8*   CREATININE 1.4*         Post operative CXR:       Atelectasis, No pneumothorax noted, increased vascular markings bilateral, No significant pleural effusion. ETT/lines/drains appear to be in proper position. Final Radiology report pending. General Appearance: arrived to ICU intubated on ventilator, on high dose inopressor support   Eyes: PERRL  Pulmonary: Diminished bibasilar. No wheezes, no accessory muscle use noted   Ventilator: Mode: AC/VC, 50 FiO2, 8 PEEP, 500 Vt   Cardiovascular: RRR, no heaves or thrills palpated   Telemetry: ST  Abdomen: Soft, OG to LIWS   Extremities: Palpable pulses all extremities, trace edema   Neurologic/Psych: Sedated   Skin: Warm and dry  Incision: MSI with celina dressing intact, LLE SVG sites with ace wrap on, right groin dressing intact    Assessment/Plan: Day of Surgery     1.  VHD, CAD S/p AVR, MVr, CABGx2, LAAC  - ASA, Lipitor   - Ancef  - Monitor chest tube output and hemodynamics closely  - IV Amio for afib prophylaxis     2. Acute Pulmonary Insufficiency Following Surgery    - Expected 2/2 surgery  - Intubated on ventilator, adjust setting as needed-maintain lung protective ventilation overnight in setting of critical illness,   - ABGs per protocol and PRN     3. Cardiogenic shock   - ICM; EF ~20%, on 0.09units/min vasopressin, 16mcq/min epinephrine and 5 mcq/kg/min dobutamine for hemodynamic support. Cell savor still transfusing   - RVSP 50s, CI 2.8, CO 7.1,   - Scr 1.1 preop  - Trend lactic, CO, mixed venous     4. Lactic acidosis  -Lactic 4.1, continue supportive care     5. Acute Post Operative Pain   - IV fentanyl for pain management     6. Uncontrolled DMII  - Hemoglobin A1c 8.0  - RHI gtt per nomogram for glycemic control         This patient has a high probability of sudden deterioration, which requires preparedness to urgently intervene. I participated in the decision making process and managed the direct care of the patient that required frequent assessment and treatment. I personally spent 54minutes of critical care time treating the patient.      Electronically signed by SHIRA Vicente CNP on 8/30/2021 at 12:44 PM

## 2021-08-30 NOTE — PLAN OF CARE
Problem: Anxiety:  Goal: Level of anxiety will decrease  Description: Level of anxiety will decrease  Outcome: Met This Shift     Problem: Cardiac Output - Decreased:  Goal: Cardiac output within specified parameters  Description: Cardiac output within specified parameters  Outcome: Met This Shift  Goal: Hemodynamic stability will improve  Description: Hemodynamic stability will improve  Outcome: Met This Shift     Problem: Fluid Volume - Imbalance:  Goal: Ability to achieve a balanced intake and output will improve  Description: Ability to achieve a balanced intake and output will improve  Outcome: Met This Shift  Goal: Chest tube drainage is within specified parameters  Description: Chest tube drainage is within specified parameters  Outcome: Met This Shift     Problem: Gas Exchange - Impaired:  Goal: Levels of oxygenation will improve  Description: Levels of oxygenation will improve  Outcome: Met This Shift  Goal: Ability to maintain adequate ventilation will improve  Description: Ability to maintain adequate ventilation will improve  Outcome: Met This Shift     Problem: Pain:  Goal: Pain level will decrease  Description: Pain level will decrease  Outcome: Met This Shift  Goal: Control of acute pain  Description: Control of acute pain  Outcome: Met This Shift  Goal: Control of chronic pain  Description: Control of chronic pain  Outcome: Met This Shift     Problem: Tissue Perfusion - Cardiopulmonary, Altered:  Goal: Absence of angina  Description: Absence of angina  Outcome: Met This Shift  Goal: Hemodynamic stability will improve  Description: Hemodynamic stability will improve  Outcome: Met This Shift  Goal: Will show no evidence of cardiac arrhythmias  Description: Will show no evidence of cardiac arrhythmias  Outcome: Met This Shift

## 2021-08-30 NOTE — BRIEF OP NOTE
Brief Postoperative Note    Josue Coreas  YOB: 1960  [id number]    Pre-operative Diagnosis: AS/CAD/ICM/CHF    Post-operative Diagnosis: Same    Operation: Insertion of left radial arterial line/Sternotomy/AVR with 27mm Mane Inspiris bioprosthetic/Mitral valve repair by decalcification of the anterior leaflet of the mitral valve/Reduction aortoplasty/CABG x 2 (LIMA-LAD, SVG-Diag 1)//EVH LLE/DERRICK exclusion with 35 mm AtriClip/Rigid internal fixation of the sternum with KLS plates x 2/2 modifier    Anesthesia: General    Surgeon: Trina Avila    Assistants: Bertin/Joseph    Estimated Blood Loss: [id number]    Complications: None    Specimens:   ID Type Source Tests Collected by Time Destination   A : AORTA Tissue Heart SURGICAL PATHOLOGY Awilda Beltran MD 8/30/2021 3813    B : ANTERIOR LEAFLET CALCIFICATION OF THE MITRAL VALVE Tissue Heart SURGICAL PATHOLOGY Awilda Beltran MD 8/30/2021 1260    C : AORTIC VALVE AND LEAFLETS Tissue Heart SURGICAL PATHOLOGY Awilda Beltran MD 8/30/2021 5487        Implants:  Implant Name Type Inv. Item Serial No.  Lot No. LRB No. Used Action   DEVICE OCCL CLP L35MM PLUNG GRP FLX SHFT FOR GILLINOV  DEVICE OCCL CLP L35MM PLUNG GRP FLX SHFT FOR GILLINOV  [employer] [id number] N/A 1 Implanted   VALVE AORT SZ 27MM BOV PERICARD VFIT TECHNOLOGY HRT VLV REPL - [id number]  VALVE AORT SZ 27MM BOV PERICARD VFIT TECHNOLOGY HRT VLV REPL [id number] [employer]  N/A 1 Implanted   PLATE BONE 3.0EE THICKNESS STRNL LCK 6 H CP TI  PLATE BONE 3.6AG THICKNESS STRNL LCK 6 H CP TI  K[employer]WD  N/A 1 Implanted   PLATE LCK STRNL 8-H LAD T 1.8MM  PLATE LCK STRNL 8-H LAD T 1.8MM  [employer]WD  N/A 1 Implanted   SCREW BNE L13MM DIA2. 3MM THOR STRNL TI MABLE DRL FREE LEV 1  SCREW BNE L13MM DIA2. 3MM THOR STRNL TI MABLE DRL FREE LEV 1  [employer]-WD  N/A 12 Implanted   SCREW BNE L15MM DIA2. 3MM THOR STRNL TI MABLE DRL FREE LEV 1  SCREW BNE L15MM DIA2. 3MM THOR STRNL TI MABLE DRL FREE LEV 1 [employer]WD  N/A 2 Implanted         Drains:   Chest Tube 1 Mediastinal 32 [place] (Active)   Dressing Status Clean;Dry; Intact 08/30/21 1041   Dressing Type Dry dressing 08/30/21 1041   Site Assessment Clean;Dry; Intact 08/30/21 1041       Chest Tube 2 Left Mediastinal 32 [place] (Active)   Dressing Status Clean;Dry; Intact 08/30/21 1040   Dressing Type Dry dressing 08/30/21 1040   Site Assessment Clean;Dry; Intact 08/30/21 1040       Chest Tube 3 Left Pleural 28 [place] (Active)   Dressing Status Clean;Dry; Intact 08/30/21 1041   Dressing Type Dry dressing 08/30/21 1041   Site Assessment Clean;Dry; Intact 08/30/21 1041       Chest Tube Right Pleural 28 [place] (Active)   Dressing Status Clean;Dry; Intact 08/30/21 1041   Dressing Type Dry dressing 08/30/21 1041   Site Assessment Clean;Dry; Intact 08/30/21 1041       Urethral Catheter Non-latex 16 fr (Active)       Findings: see dictation    Electronically signed by Kenya Mayo MD on 8/30/2021 at 11:23 AM

## 2021-08-30 NOTE — PROGRESS NOTES
Inpatient Cardiology Progress note     PATIENT IS BEING FOLLOWED FOR: AS/CAD/ICM/CHF    Surekha Mercer is a 63 y.o. male who is known to Dr. Fauzia Harrell (during initial consultation 6/17/2021) for acute HFrEF, CMP and low flow low gradient severe AS. Patient underwent a cath 6/2021 showing Severe LAD and IR disease, Severe AS. CTS was consulted --> today presents for  Sternotomy/AVR with 27mm Mane Inspiris bioprosthetic/Mitral valve repair by decalcification of the anterior leaflet of the mitral valve/Reduction aortoplasty/CABG x 2 (LIMA-LAD, SVG-Diag 1)/DERRICK exclusion with 35 mm AtriClip/Rigid internal fixation of the sternum with KLS plates x 22 modifier on 8/30/2021. SUBJECTIVE: Unable to assess due to the patient is intubated on mechanical ventilation. OBJECTIVE: No apparent distress      ROS: Unable to assess due to the patient is intubated. PHYSICAL EXAM:   /62   Pulse 103   Temp 96 °F (35.6 °C)   Resp 21   Ht 6' 3\" (1.905 m)   Wt 283 lb (128.4 kg)   SpO2 95%   BMI 35.37 kg/m²    B/P Range last 24 hours: Systolic (97UYS), JVF:814 , Min:110 , OOE:994    Diastolic (25GHF), GVK:39, Min:52, Max:119    CONST: Well developed, well nourished middle aged male who appears stated age. Intubated on mechanical ventilation. HEENT:   Head- Normocephalic, atraumatic   Eyes- Conjunctivae pink, anicteric  Throat- +OG. Orally intubated. Neck-  No stridor, trachea midline, no jugular venous distention. No carotid bruit   CHEST: Chest symmetrical and non-tender to palpation. No accessory muscle use or intercostal retractions. MSI with celina dressing intact. RESPIRATORY:  Lung sounds - clear throughout fields   CARDIOVASCULAR:     Heart Inspection- shows no noted pulsations  Heart Palpation- no heaves or thrills; PMI is non-displaced   Heart Ausculation- Regular rate and rhythm, no murmur. No s3, s4 or rub   PV: LLE SVG sites with ACE wrap. No lower extremity edema. No varicosities.  Pedal pulses palpable, no clubbing or cyanosis   ABDOMEN: Soft, non-tender to light palpation. Bowel sounds present. No palpable masses no organomegaly; no abdominal bruit  MS: Good muscle tone; unable to assess strength. No atrophy or abnormal movements. : Wilkins catheter draining clear yellow urine.    SKIN: Warm and dry no statis dermatitis or ulcers   NEURO / PSYCH: Unable to fully assess due to the patient is intubated       Intake/Output Summary (Last 24 hours) at 8/30/2021 1232  Last data filed at 8/30/2021 1207  Gross per 24 hour   Intake 2100 ml   Output 2995 ml   Net -895 ml       Weight:   Wt Readings from Last 3 Encounters:   08/30/21 283 lb (128.4 kg)   08/26/21 283 lb 3 oz (128.5 kg)   08/10/21 282 lb (127.9 kg)     Current Inpatient Medications:   atorvastatin  40 mg Oral Nightly    sodium chloride flush  10 mL IntraVENous 2 times per day    [START ON 8/31/2021] aspirin  81 mg Oral Daily    aspirin  300 mg Rectal Once    chlorhexidine  15 mL Mouth/Throat BID    [START ON 8/31/2021] magnesium oxide  400 mg Oral Daily    mupirocin   Nasal BID    sennosides-docusate sodium  1 tablet Oral BID    [START ON 8/31/2021] pantoprazole  40 mg Oral Daily    pantoprazole  40 mg IntraVENous Daily    And    sodium chloride (PF)  10 mL IntraVENous Daily    ceFAZolin (ANCEF) IVPB  3,000 mg IntraVENous Q8H    ipratropium-albuterol  1 ampule Inhalation Q4H WA    [START ON 8/31/2021] insulin glargine  0.15 Units/kg SubCUTAneous Nightly    insulin lispro  0-18 Units SubCUTAneous Q4H    [START ON 8/31/2021] tamsulosin  0.4 mg Oral Daily    propofol        albumin human           IV Infusions (if any):   sodium chloride      sodium chloride      propofol      sodium chloride      norepinephrine      insulin      dextrose      amiodarone 450mg/250ml D5W infusion      vasopressin (Septic Shock) infusion 0.09 Units/min (08/30/21 1200)    DOBUTamine 5 mcg/kg/min (08/30/21 1159)    EPINEPHrine infusion 0.13 Vaso, Dobutamine). Hx of HTN. 5. Acute hypoxic respiratory failure: intubated. 6. Former tobacco use: quit in 2011.   7. ETOH use  8. T2DM: Hgb A1c 8.0  9. COVID-19 Infection (11/2020)   10. Obesity: BMI 35.37 kg/m2  11. Lactic Acidosis: 4.1  12. Hypokalemia   13. Hypocalcemia   14. Leukocytosis post op   15. BPH    PLAN:  1. Continue current management as per CTS  2. Wean pressors as tolerated   3. Continue ASA and Lipitor   4. Monitor telemetry  5. Monitor H/H  6. Further recommendations pending the above clinical course. Assessment/Plan discussed with Dr. Sahil Phipps.  Electronically signed by SHIRA Matthews CNP on 8/30/21 at 12:46 PM EDT    __________________________________________________________________  I independently interviewed and examined the patient. I have reviewed the above documentation completed by the CAREY. Please see my additional contributions to the HPI, physical exam, and assessment / medical decision making. HPI, ROS, PMH, PSH, [address], [place], and medications independently reviewed (agree; see above documentation)    History of Present Illness:  S/p CT surgery earlier today. Sedated/on vent. SR/ST on telemetry.     Review of Systems:   Unable to assess / sedated on vent    Physical Exam:  /62   Pulse 94   Temp 96 °F (35.6 °C)   Resp 24   Ht 6' 3\" (1.905 m)   Wt 283 lb (128.4 kg)   SpO2 95%   BMI 35.37 kg/m²   Wt Readings from Last 3 Encounters:   08/30/21 283 lb (128.4 kg)   08/26/21 283 lb 3 oz (128.5 kg)   08/10/21 282 lb (127.9 kg)     Appearance: Sedated, on vent  Skin: Intact, no rash  Head: Normocephalic, atraumatic  Eyes: EOMI, no conjunctival erythema  ENMT: MMM, no rhinorrhea  Neck: Supple, no carotid bruits  Lungs: Decreased BS B/L, no wheezing  Cardiac: Regular rate and rhythm, +S1S2, +rub  Abdomen: Soft, +bowel sounds  Extremities: Moves all extremities x 4, no lower extremity edema  Neurologic: Sedated, on vent    Laboratory Tests:  Recent Labs 08/30/21  1054 08/30/21  1133 08/30/21  1204   NA  --   --  141   K 3.3* 2.8* 3.0*   CL  --   --  109*   CO2  --   --  21*   BUN  --   --  21   CREATININE 1.4* 1.4* 1.2   GLUCOSE  --   --  225*   CALCIUM  --   --  7.9*     Lab Results   Component Value Date    MG 2.1 08/30/2021     No results for input(s): ALKPHOS, ALT, AST, PROT, BILITOT, BILIDIR, LABALBU in the last 72 hours. Recent Labs     08/30/21  1204   WBC 32.1*   RBC 3.92   HGB 12.0*   HCT 38.3   MCV 97.7   MCH 30.6   MCHC 31.3*   RDW 16.1*      MPV 10.6     No results found for: CKTOTAL, CKMB, CKMBINDEX, TROPONINI  No results for input(s): CKTOTAL, CKMB, CKMBINDEX, TROPHS in the last 72 hours. No results found for: TSHFT4, TSH  Lab Results   Component Value Date    LABA1C 8.0 (H) 08/26/2021     No results found for: EAG  Lab Results   Component Value Date    CHOL 124 06/18/2021     Lab Results   Component Value Date    TRIG 70 06/18/2021     Lab Results   Component Value Date    HDL 33 06/22/2021    HDL 35 06/18/2021     Lab Results   Component Value Date    LDLCALC 87 06/22/2021    LDLCALC 75 06/18/2021     Lab Results   Component Value Date    LABVLDL 22 06/22/2021    LABVLDL 14 06/18/2021     No results found for: CHOLHDLRATIO  No results for input(s): PROBNP in the last 72 hours. Cardiac Tests:  Telemetry reviewed (date: 8/30/2021): SR at rate 90's    - Care per CTS  - Currently on multiple pressors / GDMT on hold  - Replace K  - Monitor H/H and BMP  - Telemetry reviewed (SR; on amiodarone)      Thank you for allowing me to participate in your patient's care. Please feel free to contact me if you have any questions or concerns.     Yisel Bellamy MD  [facility]) Cardiology

## 2021-08-31 ENCOUNTER — APPOINTMENT (OUTPATIENT)
Dept: GENERAL RADIOLOGY | Age: 61
DRG: 163 | End: 2021-08-31
Attending: THORACIC SURGERY (CARDIOTHORACIC VASCULAR SURGERY)
Payer: COMMERCIAL

## 2021-08-31 LAB
ALBUMIN SERPL-MCNC: 2.9 G/DL (ref 3.5–5.2)
ALBUMIN SERPL-MCNC: 3.2 G/DL (ref 3.5–5.2)
ALP BLD-CCNC: 46 U/L (ref 40–129)
ALP BLD-CCNC: 66 U/L (ref 40–129)
ALT SERPL-CCNC: 14 U/L (ref 0–40)
ALT SERPL-CCNC: 18 U/L (ref 0–40)
ANION GAP SERPL CALCULATED.3IONS-SCNC: 10 MMOL/L (ref 7–16)
ANION GAP SERPL CALCULATED.3IONS-SCNC: 12 MMOL/L (ref 7–16)
AST SERPL-CCNC: 35 U/L (ref 0–39)
AST SERPL-CCNC: 39 U/L (ref 0–39)
B.E.: -0.1 MMOL/L (ref -3–0)
B.E.: -0.2 MMOL/L (ref -3–0)
B.E.: -0.3 MMOL/L (ref -3–0)
B.E.: -0.4 MMOL/L (ref -3–0)
B.E.: -0.5 MMOL/L (ref -3–0)
B.E.: -0.8 MMOL/L (ref -3–0)
B.E.: -0.9 MMOL/L (ref -3–0)
B.E.: -1 MMOL/L (ref -3–0)
B.E.: -1.1 MMOL/L (ref -3–0)
B.E.: -1.1 MMOL/L (ref -3–0)
B.E.: -1.4 MMOL/L (ref -3–0)
B.E.: -1.4 MMOL/L (ref -3–0)
B.E.: -1.6 MMOL/L (ref -3–0)
B.E.: -2.4 MMOL/L (ref -3–0)
B.E.: -2.6 MMOL/L (ref -3–0)
B.E.: -3 MMOL/L (ref -3–0)
B.E.: -3.4 MMOL/L (ref -3–0)
B.E.: -4.2 MMOL/L (ref -3–0)
B.E.: -4.4 MMOL/L (ref -3–0)
B.E.: -5.2 MMOL/L (ref -3–0)
B.E.: -5.8 MMOL/L (ref -3–0)
B.E.: -5.9 MMOL/L (ref -3–0)
B.E.: 0.1 MMOL/L (ref -3–0)
B.E.: 0.7 MMOL/L (ref -3–0)
B.E.: 0.9 MMOL/L (ref -3–0)
BILIRUB SERPL-MCNC: 0.4 MG/DL (ref 0–1.2)
BILIRUB SERPL-MCNC: 0.8 MG/DL (ref 0–1.2)
BUN BLDV-MCNC: 15 MG/DL (ref 6–23)
BUN BLDV-MCNC: 18 MG/DL (ref 6–23)
CALCIUM SERPL-MCNC: 8.2 MG/DL (ref 8.6–10.2)
CALCIUM SERPL-MCNC: 8.3 MG/DL (ref 8.6–10.2)
CHLORIDE BLD-SCNC: 104 MMOL/L (ref 98–107)
CHLORIDE BLD-SCNC: 108 MMOL/L (ref 98–107)
CO2: 21 MMOL/L (ref 22–29)
CO2: 22 MMOL/L (ref 22–29)
CREAT SERPL-MCNC: 1 MG/DL (ref 0.7–1.2)
CREAT SERPL-MCNC: 1 MG/DL (ref 0.7–1.2)
DELIVERY SYSTEMS: ABNORMAL
DEVICE: ABNORMAL
FIO2 ARTERIAL: 40
FIO2 ARTERIAL: 50
FIO2 ARTERIAL: 60
GFR AFRICAN AMERICAN: >60
GFR AFRICAN AMERICAN: >60
GFR NON-AFRICAN AMERICAN: >60 ML/MIN/1.73
GFR NON-AFRICAN AMERICAN: >60 ML/MIN/1.73
GLUCOSE BLD-MCNC: 173 MG/DL (ref 74–99)
GLUCOSE BLD-MCNC: 222 MG/DL (ref 74–99)
HCO3 ARTERIAL: 20.1 MMOL/L (ref 22–26)
HCO3 ARTERIAL: 21 MMOL/L (ref 22–26)
HCO3 ARTERIAL: 21.2 MMOL/L (ref 22–26)
HCO3 ARTERIAL: 21.2 MMOL/L (ref 22–26)
HCO3 ARTERIAL: 21.3 MMOL/L (ref 22–26)
HCO3 ARTERIAL: 21.4 MMOL/L (ref 22–26)
HCO3 ARTERIAL: 21.7 MMOL/L (ref 22–26)
HCO3 ARTERIAL: 21.8 MMOL/L (ref 22–26)
HCO3 ARTERIAL: 22 MMOL/L (ref 22–26)
HCO3 ARTERIAL: 22.2 MMOL/L (ref 22–26)
HCO3 ARTERIAL: 22.4 MMOL/L (ref 22–26)
HCO3 ARTERIAL: 22.7 MMOL/L (ref 22–26)
HCO3 ARTERIAL: 22.8 MMOL/L (ref 22–26)
HCO3 ARTERIAL: 23.1 MMOL/L (ref 22–26)
HCO3 ARTERIAL: 23.1 MMOL/L (ref 22–26)
HCO3 ARTERIAL: 23.2 MMOL/L (ref 22–26)
HCO3 ARTERIAL: 23.2 MMOL/L (ref 22–26)
HCO3 ARTERIAL: 23.4 MMOL/L (ref 22–26)
HCO3 ARTERIAL: 23.4 MMOL/L (ref 22–26)
HCO3 ARTERIAL: 23.5 MMOL/L (ref 22–26)
HCO3 ARTERIAL: 23.6 MMOL/L (ref 22–26)
HCO3 ARTERIAL: 23.8 MMOL/L (ref 22–26)
HCO3 ARTERIAL: 23.8 MMOL/L (ref 22–26)
HCO3 ARTERIAL: 24 MMOL/L (ref 22–26)
HCO3 ARTERIAL: 24.5 MMOL/L (ref 22–26)
HCT (EST): 35 % (ref 37–54)
HCT (EST): 35 % (ref 37–54)
HCT (EST): 36 % (ref 37–54)
HCT (EST): 37 % (ref 37–54)
HCT (EST): 38 % (ref 37–54)
HCT (EST): 39 % (ref 37–54)
HCT (EST): 40 % (ref 37–54)
HCT (EST): 41 % (ref 37–54)
HCT (EST): 41 % (ref 37–54)
HCT (EST): 42 % (ref 37–54)
HCT (EST): 43 % (ref 37–54)
HCT (EST): 46 % (ref 37–54)
HCT VFR BLD CALC: 36.8 % (ref 37–54)
HCT VFR BLD CALC: 42.8 % (ref 37–54)
HEMOGLOBIN: 12 G/DL (ref 12.5–16.5)
HEMOGLOBIN: 13.4 G/DL (ref 12.5–16.5)
HGB, (EST): 11.8 G/DL (ref 12.5–15.5)
HGB, (EST): 11.9 G/DL (ref 12.5–15.5)
HGB, (EST): 12.1 G/DL (ref 12.5–15.5)
HGB, (EST): 12.3 G/DL (ref 12.5–15.5)
HGB, (EST): 12.3 G/DL (ref 12.5–15.5)
HGB, (EST): 12.5 G/DL (ref 12.5–15.5)
HGB, (EST): 12.6 G/DL (ref 12.5–15.5)
HGB, (EST): 12.7 G/DL (ref 12.5–15.5)
HGB, (EST): 12.7 G/DL (ref 12.5–15.5)
HGB, (EST): 12.8 G/DL (ref 12.5–15.5)
HGB, (EST): 12.8 G/DL (ref 12.5–15.5)
HGB, (EST): 12.9 G/DL (ref 12.5–15.5)
HGB, (EST): 13.3 G/DL (ref 12.5–15.5)
HGB, (EST): 13.5 G/DL (ref 12.5–15.5)
HGB, (EST): 13.6 G/DL (ref 12.5–15.5)
HGB, (EST): 13.7 G/DL (ref 12.5–15.5)
HGB, (EST): 14 G/DL (ref 12.5–15.5)
HGB, (EST): 14.1 G/DL (ref 12.5–15.5)
HGB, (EST): 14.4 G/DL (ref 12.5–15.5)
HGB, (EST): 14.5 G/DL (ref 12.5–15.5)
HGB, (EST): 14.6 G/DL (ref 12.5–15.5)
HGB, (EST): 14.8 G/DL (ref 12.5–15.5)
HGB, (EST): 15.6 G/DL (ref 12.5–15.5)
LACTIC ACID: 1.5 MMOL/L (ref 0.5–2.2)
LACTIC ACID: 1.6 MMOL/L (ref 0.5–2.2)
LACTIC ACID: 1.9 MMOL/L (ref 0.5–2.2)
LACTIC ACID: 2.1 MMOL/L (ref 0.5–2.2)
LACTIC ACID: 2.7 MMOL/L (ref 0.5–2.2)
LACTIC ACID: 2.9 MMOL/L (ref 0.5–2.2)
LACTIC ACID: 3.7 MMOL/L (ref 0.5–2.2)
LACTIC ACID: 5.1 MMOL/L (ref 0.5–2.2)
LACTIC ACID: 5.4 MMOL/L (ref 0.5–2.2)
LACTIC ACID: 6.4 MMOL/L (ref 0.5–2.2)
MCH RBC QN AUTO: 30.1 PG (ref 26–35)
MCH RBC QN AUTO: 31.3 PG (ref 26–35)
MCHC RBC AUTO-ENTMCNC: 31.3 % (ref 32–34.5)
MCHC RBC AUTO-ENTMCNC: 32.6 % (ref 32–34.5)
MCV RBC AUTO: 96.1 FL (ref 80–99.9)
MCV RBC AUTO: 96.2 FL (ref 80–99.9)
METER GLUCOSE: 100 MG/DL (ref 74–99)
METER GLUCOSE: 113 MG/DL (ref 74–99)
METER GLUCOSE: 119 MG/DL (ref 74–99)
METER GLUCOSE: 121 MG/DL (ref 74–99)
METER GLUCOSE: 124 MG/DL (ref 74–99)
METER GLUCOSE: 128 MG/DL (ref 74–99)
METER GLUCOSE: 128 MG/DL (ref 74–99)
METER GLUCOSE: 130 MG/DL (ref 74–99)
METER GLUCOSE: 138 MG/DL (ref 74–99)
METER GLUCOSE: 148 MG/DL (ref 74–99)
METER GLUCOSE: 151 MG/DL (ref 74–99)
METER GLUCOSE: 152 MG/DL (ref 74–99)
METER GLUCOSE: 156 MG/DL (ref 74–99)
METER GLUCOSE: 158 MG/DL (ref 74–99)
METER GLUCOSE: 160 MG/DL (ref 74–99)
METER GLUCOSE: 161 MG/DL (ref 74–99)
METER GLUCOSE: 167 MG/DL (ref 74–99)
METER GLUCOSE: 168 MG/DL (ref 74–99)
METER GLUCOSE: 169 MG/DL (ref 74–99)
METER GLUCOSE: 175 MG/DL (ref 74–99)
METER GLUCOSE: 213 MG/DL (ref 74–99)
METER GLUCOSE: 214 MG/DL (ref 74–99)
METER GLUCOSE: 215 MG/DL (ref 74–99)
METER GLUCOSE: 217 MG/DL (ref 74–99)
METER GLUCOSE: 59 MG/DL (ref 74–99)
MODE: ABNORMAL
MODE: AC
O2 SATURATION: 65.8 % (ref 92–98.5)
O2 SATURATION: 73 % (ref 92–98.5)
O2 SATURATION: 73.7 % (ref 92–98.5)
O2 SATURATION: 73.8 % (ref 92–98.5)
O2 SATURATION: 73.9 % (ref 92–98.5)
O2 SATURATION: 74.1 % (ref 92–98.5)
O2 SATURATION: 74.4 % (ref 92–98.5)
O2 SATURATION: 75 % (ref 92–98.5)
O2 SATURATION: 75.8 % (ref 92–98.5)
O2 SATURATION: 78.4 % (ref 92–98.5)
O2 SATURATION: 79.6 % (ref 92–98.5)
O2 SATURATION: 80 % (ref 92–98.5)
O2 SATURATION: 83.1 % (ref 92–98.5)
O2 SATURATION: 92.7 % (ref 92–98.5)
O2 SATURATION: 96 % (ref 92–98.5)
O2 SATURATION: 96.6 % (ref 92–98.5)
O2 SATURATION: 96.8 % (ref 92–98.5)
O2 SATURATION: 97.4 % (ref 92–98.5)
O2 SATURATION: 97.5 % (ref 92–98.5)
O2 SATURATION: 98 % (ref 92–98.5)
O2 SATURATION: 98.1 % (ref 92–98.5)
O2 SATURATION: 98.2 % (ref 92–98.5)
O2 SATURATION: 98.5 % (ref 92–98.5)
O2 SATURATION: 98.9 % (ref 92–98.5)
O2 SATURATION: 99.2 % (ref 92–98.5)
OPERATOR ID: 1556
OPERATOR ID: 1926
OPERATOR ID: 1926
OPERATOR ID: 1994
OPERATOR ID: 1994
OPERATOR ID: 421
OPERATOR ID: 475
PCO2 ARTERIAL: 26.4 MMHG (ref 35–45)
PCO2 ARTERIAL: 29.6 MMHG (ref 35–45)
PCO2 ARTERIAL: 29.9 MMHG (ref 35–45)
PCO2 ARTERIAL: 30 MMHG (ref 35–45)
PCO2 ARTERIAL: 31.5 MMHG (ref 35–45)
PCO2 ARTERIAL: 31.9 MMHG (ref 35–45)
PCO2 ARTERIAL: 32.9 MMHG (ref 35–45)
PCO2 ARTERIAL: 33.5 MMHG (ref 35–45)
PCO2 ARTERIAL: 33.7 MMHG (ref 35–45)
PCO2 ARTERIAL: 35.1 MMHG (ref 35–45)
PCO2 ARTERIAL: 35.3 MMHG (ref 35–45)
PCO2 ARTERIAL: 35.5 MMHG (ref 35–45)
PCO2 ARTERIAL: 35.9 MMHG (ref 35–45)
PCO2 ARTERIAL: 37.2 MMHG (ref 35–45)
PCO2 ARTERIAL: 38.5 MMHG (ref 35–45)
PCO2 ARTERIAL: 39.6 MMHG (ref 35–45)
PCO2 ARTERIAL: 39.8 MMHG (ref 35–45)
PCO2 ARTERIAL: 39.8 MMHG (ref 35–45)
PCO2 ARTERIAL: 41.1 MMHG (ref 35–45)
PCO2 ARTERIAL: 42.5 MMHG (ref 35–45)
PCO2 ARTERIAL: 42.7 MMHG (ref 35–45)
PCO2 ARTERIAL: 43.4 MMHG (ref 35–45)
PCO2 ARTERIAL: 43.7 MMHG (ref 35–45)
PCO2 ARTERIAL: 44.5 MMHG (ref 35–45)
PCO2 ARTERIAL: 46.4 MMHG (ref 35–45)
PDW BLD-RTO: 16.1 FL (ref 11.5–15)
PDW BLD-RTO: 16.4 FL (ref 11.5–15)
PH BLOOD GAS: 7.27 (ref 7.35–7.45)
PH BLOOD GAS: 7.3 (ref 7.35–7.45)
PH BLOOD GAS: 7.3 (ref 7.35–7.45)
PH BLOOD GAS: 7.31 (ref 7.35–7.45)
PH BLOOD GAS: 7.32 (ref 7.35–7.45)
PH BLOOD GAS: 7.34 (ref 7.35–7.45)
PH BLOOD GAS: 7.34 (ref 7.35–7.45)
PH BLOOD GAS: 7.36 (ref 7.35–7.45)
PH BLOOD GAS: 7.4 (ref 7.35–7.45)
PH BLOOD GAS: 7.41 (ref 7.35–7.45)
PH BLOOD GAS: 7.42 (ref 7.35–7.45)
PH BLOOD GAS: 7.43 (ref 7.35–7.45)
PH BLOOD GAS: 7.44 (ref 7.35–7.45)
PH BLOOD GAS: 7.46 (ref 7.35–7.45)
PH BLOOD GAS: 7.47 (ref 7.35–7.45)
PH BLOOD GAS: 7.47 (ref 7.35–7.45)
PH BLOOD GAS: 7.5 (ref 7.35–7.45)
PH BLOOD GAS: 7.51 (ref 7.35–7.45)
PLATELET # BLD: 129 E9/L (ref 130–450)
PLATELET # BLD: 161 E9/L (ref 130–450)
PMV BLD AUTO: 11.5 FL (ref 7–12)
PMV BLD AUTO: 11.5 FL (ref 7–12)
PO2 ARTERIAL: 100.5 MMHG (ref 60–80)
PO2 ARTERIAL: 106.2 MMHG (ref 60–80)
PO2 ARTERIAL: 117.1 MMHG (ref 60–80)
PO2 ARTERIAL: 136.2 MMHG (ref 60–80)
PO2 ARTERIAL: 150.4 MMHG (ref 60–80)
PO2 ARTERIAL: 31.9 MMHG (ref 60–80)
PO2 ARTERIAL: 35.8 MMHG (ref 60–80)
PO2 ARTERIAL: 37.4 MMHG (ref 60–80)
PO2 ARTERIAL: 37.5 MMHG (ref 60–80)
PO2 ARTERIAL: 37.8 MMHG (ref 60–80)
PO2 ARTERIAL: 38.2 MMHG (ref 60–80)
PO2 ARTERIAL: 41.8 MMHG (ref 60–80)
PO2 ARTERIAL: 41.9 MMHG (ref 60–80)
PO2 ARTERIAL: 46.5 MMHG (ref 60–80)
PO2 ARTERIAL: 47 MMHG (ref 60–80)
PO2 ARTERIAL: 47.2 MMHG (ref 60–80)
PO2 ARTERIAL: 47.9 MMHG (ref 60–80)
PO2 ARTERIAL: 48.4 MMHG (ref 60–80)
PO2 ARTERIAL: 57.5 MMHG (ref 60–80)
PO2 ARTERIAL: 78.3 MMHG (ref 60–80)
PO2 ARTERIAL: 79.7 MMHG (ref 60–80)
PO2 ARTERIAL: 85.3 MMHG (ref 60–80)
PO2 ARTERIAL: 87.7 MMHG (ref 60–80)
PO2 ARTERIAL: 95.6 MMHG (ref 60–80)
PO2 ARTERIAL: 99.6 MMHG (ref 60–80)
POSITIVE END EXP PRESS: 6 CMH2O
POSITIVE END EXP PRESS: 8 CMH2O
POTASSIUM SERPL-SCNC: 3.6 MMOL/L (ref 3.5–5)
POTASSIUM SERPL-SCNC: 3.9 MMOL/L (ref 3.5–5)
POTASSIUM SERPL-SCNC: 4.4 MMOL/L (ref 3.5–5)
PRESSURE SUPPORT: 12 CMH2O
RBC # BLD: 3.83 E12/L (ref 3.8–5.8)
RBC # BLD: 4.45 E12/L (ref 3.8–5.8)
RESPIRATORY RATE: 24 B/MIN
SODIUM BLD-SCNC: 136 MMOL/L (ref 132–146)
SODIUM BLD-SCNC: 141 MMOL/L (ref 132–146)
SOURCE, BLOOD GAS: ABNORMAL
TIDAL VOLUME: 600 ML
TOTAL PROTEIN: 5.3 G/DL (ref 6.4–8.3)
TOTAL PROTEIN: 5.5 G/DL (ref 6.4–8.3)
WBC # BLD: 27.6 E9/L (ref 4.5–11.5)
WBC # BLD: 30.4 E9/L (ref 4.5–11.5)

## 2021-08-31 PROCEDURE — 36415 COLL VENOUS BLD VENIPUNCTURE: CPT

## 2021-08-31 PROCEDURE — 84132 ASSAY OF SERUM POTASSIUM: CPT

## 2021-08-31 PROCEDURE — 82962 GLUCOSE BLOOD TEST: CPT

## 2021-08-31 PROCEDURE — 82803 BLOOD GASES ANY COMBINATION: CPT

## 2021-08-31 PROCEDURE — 2580000003 HC RX 258: Performed by: THORACIC SURGERY (CARDIOTHORACIC VASCULAR SURGERY)

## 2021-08-31 PROCEDURE — 80053 COMPREHEN METABOLIC PANEL: CPT

## 2021-08-31 PROCEDURE — 6370000000 HC RX 637 (ALT 250 FOR IP): Performed by: THORACIC SURGERY (CARDIOTHORACIC VASCULAR SURGERY)

## 2021-08-31 PROCEDURE — 2580000003 HC RX 258: Performed by: NURSE PRACTITIONER

## 2021-08-31 PROCEDURE — 99291 CRITICAL CARE FIRST HOUR: CPT | Performed by: NURSE PRACTITIONER

## 2021-08-31 PROCEDURE — 85027 COMPLETE CBC AUTOMATED: CPT

## 2021-08-31 PROCEDURE — 2000000000 HC ICU R&B

## 2021-08-31 PROCEDURE — 37799 UNLISTED PX VASCULAR SURGERY: CPT

## 2021-08-31 PROCEDURE — 99233 SBSQ HOSP IP/OBS HIGH 50: CPT | Performed by: INTERNAL MEDICINE

## 2021-08-31 PROCEDURE — 71045 X-RAY EXAM CHEST 1 VIEW: CPT

## 2021-08-31 PROCEDURE — 83605 ASSAY OF LACTIC ACID: CPT

## 2021-08-31 PROCEDURE — 6360000002 HC RX W HCPCS: Performed by: NURSE PRACTITIONER

## 2021-08-31 PROCEDURE — 2500000003 HC RX 250 WO HCPCS: Performed by: THORACIC SURGERY (CARDIOTHORACIC VASCULAR SURGERY)

## 2021-08-31 PROCEDURE — 94003 VENT MGMT INPAT SUBQ DAY: CPT

## 2021-08-31 PROCEDURE — P9045 ALBUMIN (HUMAN), 5%, 250 ML: HCPCS | Performed by: THORACIC SURGERY (CARDIOTHORACIC VASCULAR SURGERY)

## 2021-08-31 PROCEDURE — 94660 CPAP INITIATION&MGMT: CPT

## 2021-08-31 PROCEDURE — 2500000003 HC RX 250 WO HCPCS: Performed by: NURSE PRACTITIONER

## 2021-08-31 PROCEDURE — 6360000002 HC RX W HCPCS: Performed by: THORACIC SURGERY (CARDIOTHORACIC VASCULAR SURGERY)

## 2021-08-31 PROCEDURE — 94640 AIRWAY INHALATION TREATMENT: CPT

## 2021-08-31 RX ORDER — FUROSEMIDE 10 MG/ML
20 INJECTION INTRAMUSCULAR; INTRAVENOUS ONCE
Status: COMPLETED | OUTPATIENT
Start: 2021-08-31 | End: 2021-08-31

## 2021-08-31 RX ORDER — KETOROLAC TROMETHAMINE 30 MG/ML
15 INJECTION, SOLUTION INTRAMUSCULAR; INTRAVENOUS ONCE
Status: COMPLETED | OUTPATIENT
Start: 2021-08-31 | End: 2021-08-31

## 2021-08-31 RX ADMIN — POTASSIUM CHLORIDE 20 MEQ: 400 INJECTION, SOLUTION INTRAVENOUS at 06:39

## 2021-08-31 RX ADMIN — HYDROCORTISONE SODIUM SUCCINATE 100 MG: 100 INJECTION, POWDER, FOR SOLUTION INTRAMUSCULAR; INTRAVENOUS at 22:26

## 2021-08-31 RX ADMIN — 0.12% CHLORHEXIDINE GLUCONATE 15 ML: 1.2 RINSE ORAL at 09:36

## 2021-08-31 RX ADMIN — MUPIROCIN: 20 OINTMENT TOPICAL at 09:36

## 2021-08-31 RX ADMIN — ASPIRIN 81 MG: 81 TABLET, COATED ORAL at 09:36

## 2021-08-31 RX ADMIN — IPRATROPIUM BROMIDE AND ALBUTEROL SULFATE 1 AMPULE: .5; 2.5 SOLUTION RESPIRATORY (INHALATION) at 08:03

## 2021-08-31 RX ADMIN — VASOPRESSIN 0.09 UNITS/MIN: 20 INJECTION INTRAVENOUS at 00:25

## 2021-08-31 RX ADMIN — IPRATROPIUM BROMIDE AND ALBUTEROL SULFATE 1 AMPULE: .5; 2.5 SOLUTION RESPIRATORY (INHALATION) at 15:54

## 2021-08-31 RX ADMIN — EPINEPHRINE 7 MCG/MIN: 1 INJECTION INTRAMUSCULAR; INTRAVENOUS; SUBCUTANEOUS at 14:46

## 2021-08-31 RX ADMIN — SODIUM CHLORIDE, PRESERVATIVE FREE 10 ML: 5 INJECTION INTRAVENOUS at 20:01

## 2021-08-31 RX ADMIN — FENTANYL CITRATE 50 MCG: 50 INJECTION, SOLUTION INTRAMUSCULAR; INTRAVENOUS at 17:55

## 2021-08-31 RX ADMIN — Medication 400 MG: at 09:37

## 2021-08-31 RX ADMIN — ALBUMIN (HUMAN) 25 G: 12.5 INJECTION, SOLUTION INTRAVENOUS at 07:03

## 2021-08-31 RX ADMIN — FENTANYL CITRATE 50 MCG: 50 INJECTION, SOLUTION INTRAMUSCULAR; INTRAVENOUS at 12:37

## 2021-08-31 RX ADMIN — PANTOPRAZOLE SODIUM 40 MG: 40 TABLET, DELAYED RELEASE ORAL at 09:36

## 2021-08-31 RX ADMIN — DOCUSATE SODIUM 50 MG AND SENNOSIDES 8.6 MG 1 TABLET: 8.6; 5 TABLET, FILM COATED ORAL at 09:36

## 2021-08-31 RX ADMIN — CEFAZOLIN 3000 MG: 10 INJECTION, POWDER, FOR SOLUTION INTRAVENOUS at 10:54

## 2021-08-31 RX ADMIN — PROPOFOL 25 MCG/KG/MIN: 10 INJECTION, EMULSION INTRAVENOUS at 03:35

## 2021-08-31 RX ADMIN — INSULIN GLARGINE 19 UNITS: 100 INJECTION, SOLUTION SUBCUTANEOUS at 19:58

## 2021-08-31 RX ADMIN — SODIUM CHLORIDE 21.21 UNITS/HR: 9 INJECTION, SOLUTION INTRAVENOUS at 07:47

## 2021-08-31 RX ADMIN — IPRATROPIUM BROMIDE AND ALBUTEROL SULFATE 1 AMPULE: .5; 2.5 SOLUTION RESPIRATORY (INHALATION) at 20:34

## 2021-08-31 RX ADMIN — HYDROCORTISONE SODIUM SUCCINATE 100 MG: 100 INJECTION, POWDER, FOR SOLUTION INTRAMUSCULAR; INTRAVENOUS at 06:40

## 2021-08-31 RX ADMIN — PROPOFOL 30 MCG/KG/MIN: 10 INJECTION, EMULSION INTRAVENOUS at 06:51

## 2021-08-31 RX ADMIN — DOBUTAMINE IN DEXTROSE 5 MCG/KG/MIN: 400 INJECTION, SOLUTION INTRAVENOUS at 06:00

## 2021-08-31 RX ADMIN — MUPIROCIN: 20 OINTMENT TOPICAL at 20:01

## 2021-08-31 RX ADMIN — AMIODARONE HYDROCHLORIDE 0.5 MG/MIN: 50 INJECTION, SOLUTION INTRAVENOUS at 18:36

## 2021-08-31 RX ADMIN — VASOPRESSIN 0.02 UNITS/MIN: 20 INJECTION INTRAVENOUS at 09:48

## 2021-08-31 RX ADMIN — SODIUM CHLORIDE, PRESERVATIVE FREE 10 ML: 5 INJECTION INTRAVENOUS at 09:37

## 2021-08-31 RX ADMIN — ATORVASTATIN CALCIUM 40 MG: 80 TABLET, FILM COATED ORAL at 20:01

## 2021-08-31 RX ADMIN — FUROSEMIDE 20 MG: 10 INJECTION, SOLUTION INTRAMUSCULAR; INTRAVENOUS at 21:04

## 2021-08-31 RX ADMIN — SODIUM CHLORIDE 20.07 UNITS/HR: 9 INJECTION, SOLUTION INTRAVENOUS at 03:00

## 2021-08-31 RX ADMIN — SODIUM CHLORIDE 0.9 UNITS/HR: 9 INJECTION, SOLUTION INTRAVENOUS at 21:00

## 2021-08-31 RX ADMIN — SODIUM CHLORIDE 30 ML/HR: 9 INJECTION, SOLUTION INTRAVENOUS at 06:00

## 2021-08-31 RX ADMIN — HYDROCORTISONE SODIUM SUCCINATE 100 MG: 100 INJECTION, POWDER, FOR SOLUTION INTRAMUSCULAR; INTRAVENOUS at 14:43

## 2021-08-31 RX ADMIN — CEFAZOLIN 3000 MG: 10 INJECTION, POWDER, FOR SOLUTION INTRAVENOUS at 03:04

## 2021-08-31 RX ADMIN — FENTANYL CITRATE 50 MCG: 50 INJECTION, SOLUTION INTRAMUSCULAR; INTRAVENOUS at 16:10

## 2021-08-31 RX ADMIN — AMIODARONE HYDROCHLORIDE 0.5 MG/MIN: 50 INJECTION, SOLUTION INTRAVENOUS at 03:00

## 2021-08-31 RX ADMIN — DEXTROSE MONOHYDRATE 12.5 G: 25 INJECTION, SOLUTION INTRAVENOUS at 09:09

## 2021-08-31 RX ADMIN — EPINEPHRINE 12 MCG/MIN: 1 INJECTION INTRAMUSCULAR; INTRAVENOUS; SUBCUTANEOUS at 06:00

## 2021-08-31 RX ADMIN — DOCUSATE SODIUM 50 MG AND SENNOSIDES 8.6 MG 1 TABLET: 8.6; 5 TABLET, FILM COATED ORAL at 19:58

## 2021-08-31 RX ADMIN — VASOPRESSIN 0.09 UNITS/MIN: 20 INJECTION INTRAVENOUS at 04:00

## 2021-08-31 RX ADMIN — Medication 75 MCG/HR: at 06:50

## 2021-08-31 RX ADMIN — KETOROLAC TROMETHAMINE 15 MG: 30 INJECTION, SOLUTION INTRAMUSCULAR at 14:32

## 2021-08-31 RX ADMIN — OXYCODONE HYDROCHLORIDE 10 MG: 10 TABLET ORAL at 19:58

## 2021-08-31 RX ADMIN — FENTANYL CITRATE 25 MCG: 50 INJECTION, SOLUTION INTRAMUSCULAR; INTRAVENOUS at 21:19

## 2021-08-31 RX ADMIN — IPRATROPIUM BROMIDE AND ALBUTEROL SULFATE 1 AMPULE: .5; 2.5 SOLUTION RESPIRATORY (INHALATION) at 11:13

## 2021-08-31 RX ADMIN — CEFAZOLIN 3000 MG: 10 INJECTION, POWDER, FOR SOLUTION INTRAVENOUS at 18:36

## 2021-08-31 ASSESSMENT — PAIN SCALES - GENERAL
PAINLEVEL_OUTOF10: 3
PAINLEVEL_OUTOF10: 0
PAINLEVEL_OUTOF10: 0
PAINLEVEL_OUTOF10: 8
PAINLEVEL_OUTOF10: 7
PAINLEVEL_OUTOF10: 5
PAINLEVEL_OUTOF10: 0
PAINLEVEL_OUTOF10: 8
PAINLEVEL_OUTOF10: 3
PAINLEVEL_OUTOF10: 10
PAINLEVEL_OUTOF10: 9
PAINLEVEL_OUTOF10: 0
PAINLEVEL_OUTOF10: 8
PAINLEVEL_OUTOF10: 0
PAINLEVEL_OUTOF10: 0
PAINLEVEL_OUTOF10: 3
PAINLEVEL_OUTOF10: 0
PAINLEVEL_OUTOF10: 10

## 2021-08-31 ASSESSMENT — PULMONARY FUNCTION TESTS
PIF_VALUE: 28
PIF_VALUE: 28
PIF_VALUE: 25
PIF_VALUE: 28
PIF_VALUE: 30
PIF_VALUE: 30
PIF_VALUE: 29
PIF_VALUE: 28
PIF_VALUE: 28
PIF_VALUE: 23
PIF_VALUE: 28
PIF_VALUE: 29
PIF_VALUE: 28
PIF_VALUE: 29
PIF_VALUE: 29
PIF_VALUE: 28
PIF_VALUE: 29
PIF_VALUE: 28
PIF_VALUE: 27
PIF_VALUE: 17
PIF_VALUE: 29
PIF_VALUE: 28
PIF_VALUE: 28
PIF_VALUE: 29
PIF_VALUE: 28

## 2021-08-31 ASSESSMENT — PAIN DESCRIPTION - LOCATION
LOCATION: INCISION;CHEST
LOCATION: INCISION;CHEST
LOCATION: CHEST;INCISION
LOCATION: STERNUM
LOCATION: INCISION;CHEST
LOCATION: INCISION;CHEST

## 2021-08-31 ASSESSMENT — PAIN DESCRIPTION - ORIENTATION: ORIENTATION: MID;INNER

## 2021-08-31 ASSESSMENT — PAIN DESCRIPTION - DESCRIPTORS
DESCRIPTORS: SHOOTING;SHARP
DESCRIPTORS: SHOOTING;ACHING
DESCRIPTORS: ACHING;SHOOTING
DESCRIPTORS: SHOOTING;ACHING
DESCRIPTORS: ACHING;SHOOTING

## 2021-08-31 ASSESSMENT — PAIN DESCRIPTION - ONSET: ONSET: ON-GOING

## 2021-08-31 ASSESSMENT — PAIN DESCRIPTION - PAIN TYPE
TYPE: SURGICAL PAIN

## 2021-08-31 ASSESSMENT — PAIN - FUNCTIONAL ASSESSMENT: PAIN_FUNCTIONAL_ASSESSMENT: PREVENTS OR INTERFERES SOME ACTIVE ACTIVITIES AND ADLS

## 2021-08-31 ASSESSMENT — PAIN DESCRIPTION - PROGRESSION: CLINICAL_PROGRESSION: GRADUALLY WORSENING

## 2021-08-31 ASSESSMENT — PAIN DESCRIPTION - FREQUENCY: FREQUENCY: CONTINUOUS

## 2021-08-31 NOTE — PLAN OF CARE
Problem: Non-Violent Restraints  Goal: Removal from restraints as soon as assessed to be safe  8/31/2021 1041 by Luther Alvarado RN  Outcome: Not Met This Shift  8/31/2021 0847 by Luther Alvarado RN  Outcome: Ongoing     Problem: Non-Violent Restraints  Goal: No harm/injury to patient while restraints in use  8/31/2021 1041 by Luther Alvarado RN  Outcome: Met This Shift  8/31/2021 0847 by Luther Alvarado RN  Outcome: Ongoing     Problem: Non-Violent Restraints  Goal: Patient's dignity will be maintained  8/31/2021 1041 by Luther Alvarado RN  Outcome: Met This Shift  8/31/2021 0847 by Luther Alvarado RN  Outcome: Ongoing

## 2021-08-31 NOTE — PLAN OF CARE
Problem:  Activity Intolerance:  Goal: Able to perform prescribed physical activity  Description: Able to perform prescribed physical activity  Outcome: Met This Shift     Problem: Anxiety:  Goal: Level of anxiety will decrease  Description: Level of anxiety will decrease  Outcome: Met This Shift     Problem: Cardiac Output - Decreased:  Goal: Cardiac output within specified parameters  Description: Cardiac output within specified parameters  Outcome: Met This Shift  Goal: Hemodynamic stability will improve  Description: Hemodynamic stability will improve  Outcome: Met This Shift     Problem: Fluid Volume - Imbalance:  Goal: Ability to achieve a balanced intake and output will improve  Description: Ability to achieve a balanced intake and output will improve  Outcome: Met This Shift  Goal: Chest tube drainage is within specified parameters  Description: Chest tube drainage is within specified parameters  Outcome: Met This Shift     Problem: Gas Exchange - Impaired:  Goal: Levels of oxygenation will improve  Description: Levels of oxygenation will improve  Outcome: Met This Shift  Goal: Ability to maintain adequate ventilation will improve  Description: Ability to maintain adequate ventilation will improve  Outcome: Met This Shift     Problem: Pain:  Goal: Pain level will decrease  Description: Pain level will decrease  Outcome: Met This Shift  Goal: Control of acute pain  Description: Control of acute pain  Outcome: Met This Shift  Goal: Control of chronic pain  Description: Control of chronic pain  Outcome: Met This Shift     Problem: Tissue Perfusion - Cardiopulmonary, Altered:  Goal: Absence of angina  Description: Absence of angina  Outcome: Met This Shift  Goal: Hemodynamic stability will improve  Description: Hemodynamic stability will improve  Outcome: Met This Shift  Goal: Will show no evidence of cardiac arrhythmias  Description: Will show no evidence of cardiac arrhythmias  Outcome: Met This Shift Problem: Skin Integrity:  Goal: Will show no infection signs and symptoms  Description: Will show no infection signs and symptoms  Outcome: Met This Shift  Goal: Absence of new skin breakdown  Description: Absence of new skin breakdown  Outcome: Met This Shift     Problem: Discharge Planning:  Goal: Discharged to appropriate level of care  Description: Discharged to appropriate level of care  Outcome: Ongoing     Problem:  Activity Intolerance:  Goal: Ability to tolerate increased activity will improve  Description: Ability to tolerate increased activity will improve  Outcome: Ongoing     Problem: Non-Violent Restraints  Goal: Removal from restraints as soon as assessed to be safe  Outcome: Ongoing  Goal: No harm/injury to patient while restraints in use  Outcome: Ongoing  Goal: Patient's dignity will be maintained  Outcome: Ongoing

## 2021-08-31 NOTE — PLAN OF CARE
Problem: Non-Violent Restraints  Goal: Removal from restraints as soon as assessed to be safe  8/31/2021 1156 by Pancho Chavez RN  Outcome: Completed  8/31/2021 1041 by Pancho Chavez RN  Outcome: Not Met This Shift  8/31/2021 0847 by Pancho Chavez RN  Outcome: Ongoing  Goal: No harm/injury to patient while restraints in use  8/31/2021 1156 by Pancho Chavez RN  Outcome: Completed  8/31/2021 1041 by Pancho Chavez RN  Outcome: Met This Shift  8/31/2021 0847 by Pancho Chavez RN  Outcome: Ongoing  Goal: Patient's dignity will be maintained  8/31/2021 1156 by Pancho Chavez RN  Outcome: Completed  8/31/2021 1041 by Pancho Chavez RN  Outcome: Met This Shift  8/31/2021 0847 by Pancho Chavez RN  Outcome: Ongoing

## 2021-08-31 NOTE — PROGRESS NOTES
Patient extubated to bipap. No respiratory distress noted. He is alert, following directions, and moving all extremities. Bilateral soft wrist restraints are discontinued at this time. He is cooperative and calm.  Will continue to monitor

## 2021-08-31 NOTE — PROGRESS NOTES
CVICU Progress Note    Name: Edgardo Short  MRN: [id number]    CC: Postoperative Critical Care Management     Indication for Surgery/Procedure: AS, CAD, ICM, CHF     LVEF: severe ~20%   RVF:  Mild      Important/Relevant PMH/PSH: HFrEF, HTN, HLD, DMII, former smoker, ETOH       Procedure/Surgeries: 8/30/2021 Sternotomy/AVR with 27mm Mane Inspiris bioprosthetic/Mitral valve repair by decalcification of the anterior leaflet of the mitral valve/Reduction aortoplasty/CABG x 2 (LIMA-LAD, SVG-Diag 1)/DERRICK exclusion with 35 mm AtriClip/Rigid internal fixation of the sternum with KLS plates x 22 modifier     Pacing wires: Atrial and Ventricular        Intake/Output Summary (Last 24 hours) at 8/31/2021 0850  Last data filed at 8/31/2021 0800  Gross per 24 hour   Intake 3092 ml   Output 5575 ml   Net -2483 ml       Recent Labs     08/30/21  1204 08/30/21  1600 08/31/21  0408   WBC 32.1* 34.5* 27.6*   HGB 12.0* 14.3 13.4   HCT 38.3 44.6 42.8    177 161      Lab Results   Component Value Date     08/31/2021    K 3.6 08/31/2021    K 4.0 06/16/2021     08/31/2021    CO2 21 08/31/2021    BUN 15 08/31/2021    CREATININE 1.0 08/31/2021    GLUCOSE 222 08/31/2021    GLUCOSE 259 04/13/2012    CALCIUM 8.3 08/31/2021         Physical Exam:    /62   Pulse 82   Temp 98.8 °F (37.1 °C) (Core)   Resp 24   Ht 6' 3\" (1.905 m)   Wt 283 lb (128.4 kg)   SpO2 98%   BMI 35.37 kg/m²       CXR Findings: 8/31/2021      ---CXR personally viewed and interpreted by ICU Nurse Practitioner, final readings per radiology pending     General: remains intubated on ventilator on inopressor support   Eyes: PERRL, anicteric   Pulmonary: Diminished bibasilar.  No wheezes, no accessory muscle use noted   Ventilator: Mode: AC/VC, 40 FiO2, 8 PEEP, 600 Vt   Cardiovascular:  RRR, no heaves or thrills on palpation  Tele: SR  Abdomen: Soft, OG to LIWS   Extremities: Palpable pulses all extremities, trace edema   Neurologic/Psych: sedated   Skin: Warm and dry  Incisions: MSI with celina dressing intact, LLE SVG sites MAURILIO well approximated       Assessment/Plan: POD #1  1. VHD, CAD S/p AVR, MVr, CABGx2, LAAC  - ASA, Lipitor, start BB once off inopressor support   - Ancef  - MS chest tubes removed w/o difficulty, pt tolerated well. Pl drains remain to sxn   - IV Amio for afib prophylaxis      2. Acute Pulmonary Insufficiency Following Surgery    - Expected 2/2 surgery  - Remains intubated on ventilator on minimal vent settings, plan to WTE today   - ABGs per protocol and PRN      3. Cardiogenic shock   - ICM; EF ~20%  -DOS 0.09units/min vasopressin, 16mcq/min epinephrine and 5 mcq/kg/min dobutamine  -POD1- hemodynamics much improved, lactic downtrending, creatinine and lfts NML-- down to 0.05 units vasopressin, 10mcq/min epinephrine, dobutamine up to 7.5mcq. CI 2.2, CO 5.6, SVR 1278, mixed venous 83     4. Lactic acidosis  -Lactic peaked at 7.1, now normalized--continue to trend      5. Acute Post Operative Pain   - IV fentanyl for pain management      6. Uncontrolled DMII  - Hemoglobin A1c 8.0  - RHI gtt per nomogram for glycemic control       VTE Prophylaxis: Pharmacologic/Mechanical:  Yes, SCDs   Line infection prevention: Can CVC or arterial line be removed: No  Continued need for urinary catheter:  Yes - clinical indication: Patient post major surgery requiring fluid balance and input and output measurement. This patient has a high probability of sudden deterioration, which requires preparedness to urgently intervene. I participated in the decision making process and managed the direct care of the patient that required frequent assessment and treatment. I personally spent 51 minutes of critical care time treating the patient.          Dispo: CVICU     Electronically signed by SHIRA Duckworth CNP on 8/31/2021 at 8:50 AM

## 2021-08-31 NOTE — PROGRESS NOTES
Physical Therapy  Physical Therapy Attempt    Name: Melinda Spring  : 1960  MRN: [id number]      Date of Service: 2021  Chart reviewed. Spoke with NP - pt is still intubated and not medically appropriate for skilled PT at this time. Will re-attempt as able.     Mookie Santos, PT, DPT  [id number]

## 2021-08-31 NOTE — CARE COORDINATION
SOCIAL WORK/CASEMANAGEMENT TRANSITION OF CARE [facility], [address], [place] Ch[place], [phone]): called  Ramone Hence the child of pt and left vm to return call. Pod 1 avr, mvrp, cabg x2. On vent. Sw/cm to follow.  BILLY Ren  8/31/2021

## 2021-08-31 NOTE — PROGRESS NOTES
Inpatient Cardiology Progress note     PATIENT IS BEING FOLLOWED FOR: AS/CAD/ICM/CHF    Date of Service: 8/31/2021    Francois Donahue is a 63 y.o. male who is known to Dr. Ciarar Lovell (during initial consultation 6/17/2021) for acute HFrEF, CMP and low flow low gradient severe AS. Patient underwent a cath 6/2021 showing Severe LAD and IR disease, Severe AS. S/p Sternotomy/AVR with 27mm Mane Inspiris bioprosthetic/Mitral valve repair by decalcification of the anterior leaflet of the mitral valve/Reduction aortoplasty/CABG x 2 (LIMA-LAD, SVG-Diag 1)/DERRICK exclusion with 35 mm AtriClip/Rigid internal fixation of the sternum with KLS plates x 22 modifier on 8/30/2021. History of Present Illness:  S/p CT surgery on 8/30/21. Sedated/on vent. SR/ST on telemetry. Weaning pressors. Review of Systems:   Unable to assess / sedated on vent    PHYSICAL EXAM:   /62   Pulse 109   Temp 100 °F (37.8 °C) (Core)   Resp 29   Ht 6' 3\" (1.905 m)   Wt 283 lb (128.4 kg)   SpO2 96%   BMI 35.37 kg/m²    B/P Range last 24 hours: No data recorded. No data recorded.   Appearance: Sedated, on vent  Skin: Intact, no rash  Head: Normocephalic, atraumatic  Eyes: EOMI, no conjunctival erythema  ENMT: MMM, no rhinorrhea  Neck: Supple, no carotid bruits  Lungs: Decreased BS B/L, no wheezing  Cardiac: Regular rate and rhythm, +S1S2, +rub  Abdomen: Soft, +bowel sounds  Extremities: Moves all extremities x 4, no lower extremity edema  Neurologic: Sedated, on vent      Intake/Output Summary (Last 24 hours) at 8/31/2021 1348  Last data filed at 8/31/2021 1300  Gross per 24 hour   Intake 992 ml   Output 2370 ml   Net -1378 ml       Weight:   Wt Readings from Last 3 Encounters:   08/30/21 283 lb (128.4 kg)   08/26/21 283 lb 3 oz (128.5 kg)   08/10/21 282 lb (127.9 kg)     Current Inpatient Medications:   ketorolac  15 mg IntraVENous Once    atorvastatin  40 mg Oral Nightly    sodium chloride flush  10 mL IntraVENous 2 times per day    aspirin  81 mg Oral Daily    magnesium oxide  400 mg Oral Daily    mupirocin   Nasal BID    sennosides-docusate sodium  1 tablet Oral BID    pantoprazole  40 mg Oral Daily    ceFAZolin (ANCEF) IVPB  3,000 mg IntraVENous Q8H    ipratropium-albuterol  1 ampule Inhalation Q4H WA    insulin glargine  0.15 Units/kg SubCUTAneous Nightly    insulin lispro  0-18 Units SubCUTAneous Q4H    tamsulosin  0.4 mg Oral Daily    hydrocortisone sodium succinate PF  100 mg IntraVENous Q8H       IV Infusions (if any):   sodium chloride 30 mL/hr (08/31/21 0600)    sodium chloride      sodium chloride      norepinephrine      insulin 3.4 Units/hr (08/31/21 1300)    dextrose      vasopressin (Septic Shock) infusion Stopped (08/31/21 1110)    DOBUTamine 7.5 mcg/kg/min (08/31/21 0745)    EPINEPHrine infusion 8 mcg/min (08/31/21 1235)    amiodarone 450mg/250ml D5W infusion 0.5 mg/min (08/31/21 0300)       DIAGNOSTIC/ LABORATORY DATA:  Labs:   CBC:   Recent Labs     08/30/21  1600 08/31/21  0408   WBC 34.5* 27.6*   HGB 14.3 13.4   HCT 44.6 42.8    161     BMP:   Recent Labs     08/30/21  1600 08/30/21  2157 08/31/21  0104 08/31/21  0408     --   --  141   K 3.2*   < > 3.9 3.6   CO2 20*  --   --  21*   BUN 20  --   --  15   CREATININE 1.2  --   --  1.0   LABGLOM >60  --   --  >60   CALCIUM 8.3*  --   --  8.3*    < > = values in this interval not displayed. Mag:   Recent Labs     08/30/21  1204   MG 2.1     Phos: No results for input(s): PHOS in the last 72 hours. TFT: No results found for: TSH, Z1CKZXB, R8QMVMJ, THYROIDAB, FT3, T4FREE   HgA1c:   Lab Results   Component Value Date    LABA1C 8.0 (H) 08/26/2021     No results found for: EAG    BNP: No results for input(s): BNP in the last 72 hours.   PT/INR:   Recent Labs     08/30/21  1204   PROTIME 19.2*   INR 1.8     APTT:  Recent Labs     08/30/21  1204   APTT 29.6     CARDIAC ENZYMES:No results for input(s): CKTOTAL, CKMB, CKMBINDEX, TROPHS in the last 72 hours. FASTING LIPID PANEL:  Lab Results   Component Value Date    CHOL 124 06/18/2021    HDL 33 06/22/2021    LDLCALC 87 06/22/2021    TRIG 70 06/18/2021     Lab Results   Component Value Date    LACTA 1.9 08/31/2021     CXR: 8/30/2021   Stable postoperative chest with tubes and catheters as noted.       Mild CHF. Telemetry reviewed (date: 8/31/2021): SR at rate 90's    Echo: 7/12/2021 Ejection fraction is visually estimated at 20-25%. The aortic valve is bicuspid, with fusion of the right and left coronary cusps. Probably severe low-flow low-gradient aortic stenosis is present. Mean gradient 30 mmHg. YESI 0.8 cm2. YESI by planimetry 1.2 cm2. Ascending aorta and root are dilated. Cardiac catheterization: 6/21/2021 (Dr. Nicole Lopez)  Findings:  Left main: 0%  stenosis  LAD: 80-90 %  Stenosis  IR: large with 95% stenosis  Circumflex: 70 %  Stenosis of small to moderate size OM  RCA: Dominant. 30-40 %  stenosis  LV angio: not performed  Post op diagnosis:  Severe LAD and IR disease  Severe AS    ASSESSMENT/PLAN:   1. Severe CAD s/p CABG x 2 (LIMA-LAD, SVG-D1) on 8/30/2021.   2. VHD s/p bioprosthetic AVR and MVr (8/30/2021) with LAAC  3. ICMP / HFrEF: LVEF ~ 15- 20%. 4. Cardiogenic shock: on pressors (Epi, Vaso, Dobutamine). Hx of HTN. 5. Acute hypoxic respiratory failure: intubated. 6. Former tobacco use: quit in 2011.   7. ETOH use  8. T2DM: Hgb A1c 8.0  9. COVID-19 Infection (11/2020)   10. Obesity: BMI 35.37 kg/m2  11. Lactic Acidosis: 4.1 --> 1.9  12. Hypokalemia -- improved  13. Leukocytosis post op   14.  BPH    - Care per CTS  - Currently on multiple pressors (wean as able) / GDMT on hold  - Replace electrolytes PRN  - Monitor H/H and BMP  - Telemetry reviewed (SR; on amiodarone)  - Serial echocardiograms      Kathie Mcknight MD  [facility]) Cardiology

## 2021-08-31 NOTE — PROGRESS NOTES
OCCUPATIONAL THERAPY    Date:2021  Patient Name: Harsha Darling  MRN: [id number]  : 1960  [facility]              Chart reviewed. Per NP, pt not medically appropriate for therapy this am- still intubated. Will re-attempt at later time. Thank you for consult.     Loni Mohs, OTR/L [id number]

## 2021-08-31 NOTE — OP NOTE
[address]                  [address] [place],  [facility]                                OPERATIVE REPORT    PATIENT NAME: Dandy Herrmann                :        1960  MED REC NO:   [id number]                            ROOM:       3816  ACCOUNT NO:   [de-identified]                           ADMIT DATE: 2021  PROVIDER:     Noe Hart MD    DATE OF PROCEDURE:  2021    PREOPERATIVE DIAGNOSES:  Severe aortic stenosis; severe multivessel  coronary artery disease; severe ischemic cardiomyopathy with an ejection  fraction of 15%; diabetes; hypertension; congestive heart failure, [place] Heart Association class III; acute-on-chronic systolic heart  failure; morbid obesity with a BMI of 36; mild COPD with an FEV1 of 72%  predicted. POSTOPERATIVE DIAGNOSES:  Severe aortic stenosis; severe multivessel  coronary artery disease; severe ischemic cardiomyopathy with an ejection  fraction of 15%; diabetes; hypertension; congestive heart failure, [place] Heart Association class III; acute-on-chronic systolic heart  failure; morbid obesity with a BMI of 36; mild COPD with an FEV1 of 72%  predicted. INDICATIONS:  Severe aortic stenosis; severe multivessel coronary artery  disease; severe ischemic cardiomyopathy with an ejection fraction of  15%; diabetes; hypertension; congestive heart failure, [place] Heart  Association class III; acute-on-chronic systolic heart failure; morbid  obesity with a BMI of 36; mild COPD with an FEV1 of 72% predicted. SURGEON:  Noe Hart MD    ASSISTANT:  JASMINE Ellison (no other resident was adequately trained  to be able to assist). Jona Crawford was present and assisting  throughout the entire operation, from the first incision to last suture  and every part in between including the anastomoses and valve work. SECOND ASSISTANT:  JASMINE Chu harvested the  vein).     COMPLICATIONS: None, tolerated well. ESTIMATED BLOOD LOSS:  Approximately 1000 mL. ANESTHESIA:  General endotracheal.    ANESTHESIA ATTENDING:  John Art DO    SPECIMENS OBTAINED:  Include segments of the aorta, calcification of  anterior leaflet of the mitral valve and the aortic valve leaflets. DRAINS:  Two in the mediastinum, one in the left chest, one in the right  chest.    OPERATIONS PERFORMED:  1. Insertion of left radial arterial line. 2.  Sternotomy. 3.  Aortic valve replacement with 27-mm Mane Inspiris bioprosthesis. 4.  Mitral valve repair by decalcification of the anterior leaflet of  the mitral valve. 5.  Reduction aortoplasty. 6.  Coronary artery bypass grafting x2; left internal mammary artery to  LAD, saphenous vein graft to the diagonal branch #1.  7.  Endoscopic harvesting left lower extremity greater saphenous vein. 8.  Left atrial appendage exclusion with a 35-mm AtriClip. 9.  Rigid internal fixation of the sternum using KLS plates x2.  10.  22 modifier. HISTORY:  This is a 58-year-old man who was admitted with heart failure. He underwent echo which showed severely reduced ejection fraction, this  was confirmed by transesophageal echo which was confirmed severe  low-flow low-gradient aortic stenosis. Cardiac cath showed proximal LAD  and high diagonal branch of the LAD lesions. He is referred for  surgical intervention. We tried to do a cardiac MRI and however he  could not go through with it; therefore, we elected to bring him to the  operating room today for the above procedure with possible Impella  backup. All risks, benefits, and alternatives were described to the  patient including but not limited to bleeding, infection, need for  reoperation, hemothorax, pneumothorax, stroke, myocardial infarction,  and death; and the patient agreed to proceed. FINDINGS:  Preoperative GABRIELE revealed ejection fraction around 15%.   The  anterolateral wall was hypokinetic with the rest of the LV being  essentially akinetic. There is mild mitral regurgitation, mild  tricuspid regurgitation intraoperatively, and the right ventricle was  mildly reduced as well. Intraoperatively, left internal mammary artery  was a good conduit for bypass. The LAD was 2 mm vessel and a good  target for bypass. The vein was a good conduit for bypass. The  diagonal branch of the LAD was a 2 mm vessel and a good target for  bypass. The aortic valve was bicuspid where we placed with a 27-mm  Mane Inspiris Bioprosthesis. The patient had about a 4.95 cm  ascending aorta with a bicuspid valve but he is 6 feet 3 inches, and due  to his very poor heart function, I elected not to replace with the aorta  but to do a reduction aortoplasty which was done through a vertical  aortotomy with an elliptical incision and then closure using a Jeremi  closure. This reduced the aorta nicely down to probably about 3.5 cm. The patient also had calcium going down in the anterior leaflet of the  mitral and this was removed and repaired the mitral valve. The patient  was  from cardiopulmonary bypass with the assistance of a  moderate to significant amount of inotropic support. Postoperative GABRIELE  revealed ejection fraction around 20% with  mildly reduced right  ventricular function on inotropes. The aortic valve was seated well. There was no  aortic insufficiency with a mean gradient between 6 and 8. The patient  was transferred to cardiothoracic intensive care unit in stable but  guarded condition. All sponges, instruments and needle counts were  correct at the end of the case. DESCRIPTION OF OPERATION:  After adequate informed consent was obtained  and adequate preoperative antibiotics were given, the patient was  brought to the operating room in stable condition. He was laid in  supine position and induced under general endotracheal anesthesia by  Anesthesia staff.   Wilkins catheter and adequate monitoring lines were  placed including a [place] catheter. The patient's chest, abdomen,  pelvis, and lower extremities were prepped and draped in the usual  sterile fashion. Left lower extremity greater saphenous vein was  harvested in endoscopic technique and prepared on the back table for  anastomosis. Prior to prepping and draping the patient, I did place the  left radial artery line which was done using modified Seldinger  technique without difficulty. It was secured appropriately. Standard  sternotomy was performed. Left internal mammary artery was harvested in  a pedicle fashion. The patient was systemically heparinized and the  mammary was clipped distally and transected. Excellent pulsatile flow  was noted. It was injected with papaverine, clipped distally, and  placed in the left chest.  Standard retractor was placed. The  pericardium was entered and tacked to the chest wall. After ensuring  adequate ACT, the patient was instituted on a cardiopulmonary bypass by  cannulating the distal ascending aorta using 18-Romanian aortic cannula  and the right atrial appendage using a 2-stage venous cannula. Retrograde catheter and root vent were placed. LV vent was placed via  the right superior pulmonary vein. Once all bypassed, the aorta was  cross-clamped and the heart was arrested with cold blood antegrade and  retrograde Buckberg cardioplegia. Excellent diastolic arrest was noted. Topical ice was used. Cardioplegia was given intermittently throughout  the remainder of the operation. The vein was brought into the field and  was grafted to the diagonal branch using 7-0 Prolene. It was brought to  the ascending aorta and cut to appropriate length. A 35-mm AtriClip was  placed at the base of the left atrial appendage as the patient is a high  risk for postoperative atrial fibrillation. The mammary was brought  into the field and was grafted to the LAD in its midportion using 7-0  Prolene.   The pedicle was the  right chest.  With the patient warm, hemodynamically stable and in sinus  rhythm, I closed the chest using #6 steel wires including double wires;  and because the patient is morbidly obese and diabetic, he is at high  risk for sternal wound infection, therefore, I did rigid internal  fixation of the sternum using KLS plates x2. The remainder of the  wounds were closed in multiple layers of absorbable stitch. Dressings  were applied over top. The patient tolerated the procedure well. The  patient was transferred to cardiothoracic intensive care unit in serious  condition. All sponge, instrument, and needle counts were correct at  the end of the case. Certainly secondary to the extent of the operation  and his ejection fraction of 15%, a 22 modifier certainly indicated.         Anusha Ray MD    D: 08/30/2021 12:43:04       T: 08/30/2021 14:33:59     [email or web address]  Job#: [id number]     Doc#: [id number]    CC:  MD Marito Romano DO Ardelle Ben, DO Abelino Silver, MD Joli Prima, MD

## 2021-08-31 NOTE — PROGRESS NOTES
Wean parameter done on PS of 5    VT= 836 mls  F= 24 B/M  V= 18.5 l/m  NIF=-33 cmH2O  VC= 1.5 L  RSBI= 24    Verbal order to extubate per Rhett cShmidt NP. Patient was extubated to 60% O2 via Bipap. Breath Sounds post extubation were clear/diminished. Stridor was not present post extubation. SPO2 was 96%. Patient Placed On BIPAP/CPAP/ Non-Invasive Ventilation? Yes    If no must comment. Facial area red/color change? No           If YES are Blister/Lesion present? No   If yes must notify nursing staff  BIPAP/CPAP skin barrier? Yes    Skin barrier type:mepilexlite     Comments: 12/6/60%    Pooja Long

## 2021-08-31 NOTE — PROGRESS NOTES
Patient remains in soft bilateral wrist restraints for safety due to continuous pulling at ETT and OG. Patient cannot follow directions at this time. Discontinuation criteria explained to patient with no evidence of learning. Will continue to redirect and assess at this time.

## 2021-09-01 ENCOUNTER — APPOINTMENT (OUTPATIENT)
Dept: GENERAL RADIOLOGY | Age: 61
DRG: 163 | End: 2021-09-01
Attending: THORACIC SURGERY (CARDIOTHORACIC VASCULAR SURGERY)
Payer: COMMERCIAL

## 2021-09-01 LAB
ALBUMIN SERPL-MCNC: 2.9 G/DL (ref 3.5–5.2)
ALBUMIN SERPL-MCNC: 3.1 G/DL (ref 3.5–5.2)
ALBUMIN SERPL-MCNC: 3.1 G/DL (ref 3.5–5.2)
ALP BLD-CCNC: 56 U/L (ref 40–129)
ALP BLD-CCNC: 59 U/L (ref 40–129)
ALP BLD-CCNC: 69 U/L (ref 40–129)
ALT SERPL-CCNC: 11 U/L (ref 0–40)
ALT SERPL-CCNC: 12 U/L (ref 0–40)
ALT SERPL-CCNC: 12 U/L (ref 0–40)
ANION GAP SERPL CALCULATED.3IONS-SCNC: 10 MMOL/L (ref 7–16)
ANION GAP SERPL CALCULATED.3IONS-SCNC: 7 MMOL/L (ref 7–16)
ANION GAP SERPL CALCULATED.3IONS-SCNC: 8 MMOL/L (ref 7–16)
AST SERPL-CCNC: 27 U/L (ref 0–39)
AST SERPL-CCNC: 29 U/L (ref 0–39)
AST SERPL-CCNC: 31 U/L (ref 0–39)
B.E.: -0.6 MMOL/L (ref -3–0)
B.E.: -1 MMOL/L (ref -3–0)
B.E.: -1.2 MMOL/L (ref -3–0)
B.E.: -1.3 MMOL/L (ref -3–0)
B.E.: -1.5 MMOL/L (ref -3–0)
B.E.: -1.8 MMOL/L (ref -3–0)
B.E.: -2.2 MMOL/L (ref -3–0)
B.E.: -2.3 MMOL/L (ref -3–0)
BILIRUB SERPL-MCNC: 0.7 MG/DL (ref 0–1.2)
BILIRUB SERPL-MCNC: 0.8 MG/DL (ref 0–1.2)
BILIRUB SERPL-MCNC: 0.9 MG/DL (ref 0–1.2)
BUN BLDV-MCNC: 22 MG/DL (ref 6–23)
BUN BLDV-MCNC: 27 MG/DL (ref 6–23)
BUN BLDV-MCNC: 29 MG/DL (ref 6–23)
CALCIUM SERPL-MCNC: 7.8 MG/DL (ref 8.6–10.2)
CALCIUM SERPL-MCNC: 8.3 MG/DL (ref 8.6–10.2)
CALCIUM SERPL-MCNC: 8.4 MG/DL (ref 8.6–10.2)
CHLORIDE BLD-SCNC: 102 MMOL/L (ref 98–107)
CHLORIDE BLD-SCNC: 99 MMOL/L (ref 98–107)
CHLORIDE BLD-SCNC: 99 MMOL/L (ref 98–107)
CO2: 22 MMOL/L (ref 22–29)
CO2: 26 MMOL/L (ref 22–29)
CO2: 26 MMOL/L (ref 22–29)
CREAT SERPL-MCNC: 1.1 MG/DL (ref 0.7–1.2)
CREAT SERPL-MCNC: 1.3 MG/DL (ref 0.7–1.2)
CREAT SERPL-MCNC: 1.4 MG/DL (ref 0.7–1.2)
DELIVERY SYSTEMS: ABNORMAL
DEVICE: ABNORMAL
FIO2 ARTERIAL: 50
FIO2 ARTERIAL: 8
FIO2 ARTERIAL: 8
GFR AFRICAN AMERICAN: >60
GFR NON-AFRICAN AMERICAN: 51 ML/MIN/1.73
GFR NON-AFRICAN AMERICAN: 56 ML/MIN/1.73
GFR NON-AFRICAN AMERICAN: >60 ML/MIN/1.73
GLUCOSE BLD-MCNC: 213 MG/DL (ref 74–99)
GLUCOSE BLD-MCNC: 233 MG/DL (ref 74–99)
GLUCOSE BLD-MCNC: 286 MG/DL (ref 74–99)
HCO3 ARTERIAL: 20.9 MMOL/L (ref 22–26)
HCO3 ARTERIAL: 21.3 MMOL/L (ref 22–26)
HCO3 ARTERIAL: 21.7 MMOL/L (ref 22–26)
HCO3 ARTERIAL: 21.9 MMOL/L (ref 22–26)
HCO3 ARTERIAL: 22.4 MMOL/L (ref 22–26)
HCO3 ARTERIAL: 22.5 MMOL/L (ref 22–26)
HCO3 ARTERIAL: 22.7 MMOL/L (ref 22–26)
HCO3 ARTERIAL: 23.1 MMOL/L (ref 22–26)
HCT (EST): 33 % (ref 37–54)
HCT (EST): 33 % (ref 37–54)
HCT (EST): 34 % (ref 37–54)
HCT (EST): 35 % (ref 37–54)
HCT (EST): 35 % (ref 37–54)
HCT VFR BLD CALC: 35.2 % (ref 37–54)
HCT VFR BLD CALC: 35.7 % (ref 37–54)
HEMOGLOBIN: 11.2 G/DL (ref 12.5–16.5)
HEMOGLOBIN: 11.4 G/DL (ref 12.5–16.5)
HGB, (EST): 11.3 G/DL (ref 12.5–15.5)
HGB, (EST): 11.4 G/DL (ref 12.5–15.5)
HGB, (EST): 11.4 G/DL (ref 12.5–15.5)
HGB, (EST): 11.6 G/DL (ref 12.5–15.5)
HGB, (EST): 11.7 G/DL (ref 12.5–15.5)
HGB, (EST): 11.7 G/DL (ref 12.5–15.5)
HGB, (EST): 11.8 G/DL (ref 12.5–15.5)
HGB, (EST): 11.9 G/DL (ref 12.5–15.5)
LACTIC ACID: 1.1 MMOL/L (ref 0.5–2.2)
LACTIC ACID: 1.3 MMOL/L (ref 0.5–2.2)
MAGNESIUM: 2.1 MG/DL (ref 1.6–2.6)
MCH RBC QN AUTO: 30.4 PG (ref 26–35)
MCH RBC QN AUTO: 31 PG (ref 26–35)
MCHC RBC AUTO-ENTMCNC: 31.8 % (ref 32–34.5)
MCHC RBC AUTO-ENTMCNC: 31.9 % (ref 32–34.5)
MCV RBC AUTO: 95.7 FL (ref 80–99.9)
MCV RBC AUTO: 97 FL (ref 80–99.9)
METER GLUCOSE: 115 MG/DL (ref 74–99)
METER GLUCOSE: 185 MG/DL (ref 74–99)
METER GLUCOSE: 187 MG/DL (ref 74–99)
METER GLUCOSE: 200 MG/DL (ref 74–99)
METER GLUCOSE: 206 MG/DL (ref 74–99)
METER GLUCOSE: 206 MG/DL (ref 74–99)
METER GLUCOSE: 244 MG/DL (ref 74–99)
MODE: ABNORMAL
O2 SATURATION: 69.9 % (ref 92–98.5)
O2 SATURATION: 75.9 % (ref 92–98.5)
O2 SATURATION: 77.1 % (ref 92–98.5)
O2 SATURATION: 81.5 % (ref 92–98.5)
O2 SATURATION: 95.6 % (ref 92–98.5)
O2 SATURATION: 96.3 % (ref 92–98.5)
O2 SATURATION: 97.5 % (ref 92–98.5)
O2 SATURATION: 97.8 % (ref 92–98.5)
OPERATOR ID: 1556
PCO2 ARTERIAL: 30.3 MMHG (ref 35–45)
PCO2 ARTERIAL: 30.5 MMHG (ref 35–45)
PCO2 ARTERIAL: 31.3 MMHG (ref 35–45)
PCO2 ARTERIAL: 31.7 MMHG (ref 35–45)
PCO2 ARTERIAL: 33.2 MMHG (ref 35–45)
PCO2 ARTERIAL: 33.7 MMHG (ref 35–45)
PCO2 ARTERIAL: 34.6 MMHG (ref 35–45)
PCO2 ARTERIAL: 35.3 MMHG (ref 35–45)
PDW BLD-RTO: 16.8 FL (ref 11.5–15)
PDW BLD-RTO: 16.9 FL (ref 11.5–15)
PH BLOOD GAS: 7.42 (ref 7.35–7.45)
PH BLOOD GAS: 7.43 (ref 7.35–7.45)
PH BLOOD GAS: 7.45 (ref 7.35–7.45)
PH BLOOD GAS: 7.45 (ref 7.35–7.45)
PH BLOOD GAS: 7.46 (ref 7.35–7.45)
PH BLOOD GAS: 7.46 (ref 7.35–7.45)
PLATELET # BLD: 116 E9/L (ref 130–450)
PLATELET # BLD: 118 E9/L (ref 130–450)
PMV BLD AUTO: 10.5 FL (ref 7–12)
PMV BLD AUTO: 11.4 FL (ref 7–12)
PO2 ARTERIAL: 35.1 MMHG (ref 60–80)
PO2 ARTERIAL: 39.5 MMHG (ref 60–80)
PO2 ARTERIAL: 40.3 MMHG (ref 60–80)
PO2 ARTERIAL: 44.3 MMHG (ref 60–80)
PO2 ARTERIAL: 74.6 MMHG (ref 60–80)
PO2 ARTERIAL: 79.4 MMHG (ref 60–80)
PO2 ARTERIAL: 89.2 MMHG (ref 60–80)
PO2 ARTERIAL: 93 MMHG (ref 60–80)
POSITIVE END EXP PRESS: 6 CMH2O
POTASSIUM SERPL-SCNC: 5.1 MMOL/L (ref 3.5–5)
POTASSIUM SERPL-SCNC: 5.1 MMOL/L (ref 3.5–5)
POTASSIUM SERPL-SCNC: 5.2 MMOL/L (ref 3.5–5)
RBC # BLD: 3.68 E12/L (ref 3.8–5.8)
RBC # BLD: 3.68 E12/L (ref 3.8–5.8)
SODIUM BLD-SCNC: 132 MMOL/L (ref 132–146)
SODIUM BLD-SCNC: 133 MMOL/L (ref 132–146)
SODIUM BLD-SCNC: 134 MMOL/L (ref 132–146)
SOURCE, BLOOD GAS: ABNORMAL
TOTAL PROTEIN: 5.4 G/DL (ref 6.4–8.3)
TOTAL PROTEIN: 5.8 G/DL (ref 6.4–8.3)
TOTAL PROTEIN: 5.9 G/DL (ref 6.4–8.3)
WBC # BLD: 25.3 E9/L (ref 4.5–11.5)
WBC # BLD: 28.2 E9/L (ref 4.5–11.5)

## 2021-09-01 PROCEDURE — 71045 X-RAY EXAM CHEST 1 VIEW: CPT

## 2021-09-01 PROCEDURE — 97535 SELF CARE MNGMENT TRAINING: CPT

## 2021-09-01 PROCEDURE — 2000000000 HC ICU R&B

## 2021-09-01 PROCEDURE — 6370000000 HC RX 637 (ALT 250 FOR IP): Performed by: THORACIC SURGERY (CARDIOTHORACIC VASCULAR SURGERY)

## 2021-09-01 PROCEDURE — 2700000000 HC OXYGEN THERAPY PER DAY

## 2021-09-01 PROCEDURE — 99233 SBSQ HOSP IP/OBS HIGH 50: CPT | Performed by: INTERNAL MEDICINE

## 2021-09-01 PROCEDURE — 82962 GLUCOSE BLOOD TEST: CPT

## 2021-09-01 PROCEDURE — 36569 INSJ PICC 5 YR+ W/O IMAGING: CPT

## 2021-09-01 PROCEDURE — 83735 ASSAY OF MAGNESIUM: CPT

## 2021-09-01 PROCEDURE — 94660 CPAP INITIATION&MGMT: CPT

## 2021-09-01 PROCEDURE — 6370000000 HC RX 637 (ALT 250 FOR IP): Performed by: NURSE PRACTITIONER

## 2021-09-01 PROCEDURE — 6360000002 HC RX W HCPCS: Performed by: NURSE PRACTITIONER

## 2021-09-01 PROCEDURE — 80053 COMPREHEN METABOLIC PANEL: CPT

## 2021-09-01 PROCEDURE — 99233 SBSQ HOSP IP/OBS HIGH 50: CPT | Performed by: NURSE PRACTITIONER

## 2021-09-01 PROCEDURE — 94640 AIRWAY INHALATION TREATMENT: CPT

## 2021-09-01 PROCEDURE — 97166 OT EVAL MOD COMPLEX 45 MIN: CPT

## 2021-09-01 PROCEDURE — 97162 PT EVAL MOD COMPLEX 30 MIN: CPT

## 2021-09-01 PROCEDURE — 83605 ASSAY OF LACTIC ACID: CPT

## 2021-09-01 PROCEDURE — 36415 COLL VENOUS BLD VENIPUNCTURE: CPT

## 2021-09-01 PROCEDURE — 6360000002 HC RX W HCPCS: Performed by: THORACIC SURGERY (CARDIOTHORACIC VASCULAR SURGERY)

## 2021-09-01 PROCEDURE — 82803 BLOOD GASES ANY COMBINATION: CPT

## 2021-09-01 PROCEDURE — 76937 US GUIDE VASCULAR ACCESS: CPT

## 2021-09-01 PROCEDURE — 85027 COMPLETE CBC AUTOMATED: CPT

## 2021-09-01 PROCEDURE — 97530 THERAPEUTIC ACTIVITIES: CPT

## 2021-09-01 PROCEDURE — 2580000003 HC RX 258: Performed by: THORACIC SURGERY (CARDIOTHORACIC VASCULAR SURGERY)

## 2021-09-01 PROCEDURE — C1751 CATH, INF, PER/CENT/MIDLINE: HCPCS

## 2021-09-01 RX ORDER — LIDOCAINE HYDROCHLORIDE 10 MG/ML
5 INJECTION, SOLUTION EPIDURAL; INFILTRATION; INTRACAUDAL; PERINEURAL ONCE
Status: DISCONTINUED | OUTPATIENT
Start: 2021-09-01 | End: 2021-09-02

## 2021-09-01 RX ORDER — HEPARIN SODIUM (PORCINE) LOCK FLUSH IV SOLN 100 UNIT/ML 100 UNIT/ML
3 SOLUTION INTRAVENOUS PRN
Status: DISCONTINUED | OUTPATIENT
Start: 2021-09-01 | End: 2021-09-07 | Stop reason: HOSPADM

## 2021-09-01 RX ORDER — FUROSEMIDE 10 MG/ML
20 INJECTION INTRAMUSCULAR; INTRAVENOUS ONCE
Status: COMPLETED | OUTPATIENT
Start: 2021-09-01 | End: 2021-09-01

## 2021-09-01 RX ORDER — SODIUM CHLORIDE 9 MG/ML
25 INJECTION, SOLUTION INTRAVENOUS PRN
Status: DISCONTINUED | OUTPATIENT
Start: 2021-09-01 | End: 2021-09-02

## 2021-09-01 RX ORDER — SODIUM CHLORIDE 0.9 % (FLUSH) 0.9 %
5-40 SYRINGE (ML) INJECTION PRN
Status: DISCONTINUED | OUTPATIENT
Start: 2021-09-01 | End: 2021-09-07 | Stop reason: HOSPADM

## 2021-09-01 RX ORDER — INSULIN GLARGINE 100 [IU]/ML
23 INJECTION, SOLUTION SUBCUTANEOUS NIGHTLY
Status: DISCONTINUED | OUTPATIENT
Start: 2021-09-01 | End: 2021-09-02

## 2021-09-01 RX ORDER — AMIODARONE HYDROCHLORIDE 200 MG/1
200 TABLET ORAL 3 TIMES DAILY
Status: DISCONTINUED | OUTPATIENT
Start: 2021-09-01 | End: 2021-09-07 | Stop reason: HOSPADM

## 2021-09-01 RX ORDER — HEPARIN SODIUM (PORCINE) LOCK FLUSH IV SOLN 100 UNIT/ML 100 UNIT/ML
3 SOLUTION INTRAVENOUS EVERY 12 HOURS SCHEDULED
Status: DISCONTINUED | OUTPATIENT
Start: 2021-09-01 | End: 2021-09-07 | Stop reason: HOSPADM

## 2021-09-01 RX ORDER — SODIUM CHLORIDE 0.9 % (FLUSH) 0.9 %
5-40 SYRINGE (ML) INJECTION EVERY 12 HOURS SCHEDULED
Status: DISCONTINUED | OUTPATIENT
Start: 2021-09-01 | End: 2021-09-07 | Stop reason: HOSPADM

## 2021-09-01 RX ADMIN — ACETAMINOPHEN 650 MG: 325 TABLET ORAL at 08:30

## 2021-09-01 RX ADMIN — MUPIROCIN: 20 OINTMENT TOPICAL at 08:37

## 2021-09-01 RX ADMIN — INSULIN LISPRO 3 UNITS: 100 INJECTION, SOLUTION INTRAVENOUS; SUBCUTANEOUS at 08:31

## 2021-09-01 RX ADMIN — IPRATROPIUM BROMIDE AND ALBUTEROL SULFATE 1 AMPULE: .5; 2.5 SOLUTION RESPIRATORY (INHALATION) at 07:51

## 2021-09-01 RX ADMIN — Medication 10 ML: at 20:29

## 2021-09-01 RX ADMIN — INSULIN LISPRO 3 UNITS: 100 INJECTION, SOLUTION INTRAVENOUS; SUBCUTANEOUS at 04:44

## 2021-09-01 RX ADMIN — OXYCODONE HYDROCHLORIDE 10 MG: 10 TABLET ORAL at 18:14

## 2021-09-01 RX ADMIN — FUROSEMIDE 20 MG: 20 INJECTION, SOLUTION INTRAMUSCULAR; INTRAVENOUS at 08:32

## 2021-09-01 RX ADMIN — FENTANYL CITRATE 25 MCG: 50 INJECTION, SOLUTION INTRAMUSCULAR; INTRAVENOUS at 23:50

## 2021-09-01 RX ADMIN — ATORVASTATIN CALCIUM 40 MG: 80 TABLET, FILM COATED ORAL at 20:29

## 2021-09-01 RX ADMIN — AMIODARONE HYDROCHLORIDE 200 MG: 200 TABLET ORAL at 20:24

## 2021-09-01 RX ADMIN — IPRATROPIUM BROMIDE AND ALBUTEROL SULFATE 1 AMPULE: .5; 2.5 SOLUTION RESPIRATORY (INHALATION) at 16:15

## 2021-09-01 RX ADMIN — SODIUM CHLORIDE, PRESERVATIVE FREE 10 ML: 5 INJECTION INTRAVENOUS at 20:29

## 2021-09-01 RX ADMIN — IPRATROPIUM BROMIDE AND ALBUTEROL SULFATE 1 AMPULE: .5; 2.5 SOLUTION RESPIRATORY (INHALATION) at 20:10

## 2021-09-01 RX ADMIN — AMIODARONE HYDROCHLORIDE 200 MG: 200 TABLET ORAL at 13:36

## 2021-09-01 RX ADMIN — HEPARIN 300 UNITS: 100 SYRINGE at 20:25

## 2021-09-01 RX ADMIN — PANTOPRAZOLE SODIUM 40 MG: 40 TABLET, DELAYED RELEASE ORAL at 08:31

## 2021-09-01 RX ADMIN — HYDROCORTISONE SODIUM SUCCINATE 100 MG: 100 INJECTION, POWDER, FOR SOLUTION INTRAMUSCULAR; INTRAVENOUS at 06:13

## 2021-09-01 RX ADMIN — PIPERACILLIN AND TAZOBACTAM 3375 MG: 3; .375 INJECTION, POWDER, LYOPHILIZED, FOR SOLUTION INTRAVENOUS at 14:05

## 2021-09-01 RX ADMIN — OXYCODONE HYDROCHLORIDE 10 MG: 10 TABLET ORAL at 13:36

## 2021-09-01 RX ADMIN — OXYCODONE HYDROCHLORIDE 10 MG: 10 TABLET ORAL at 09:29

## 2021-09-01 RX ADMIN — OXYCODONE HYDROCHLORIDE 10 MG: 10 TABLET ORAL at 04:44

## 2021-09-01 RX ADMIN — DOCUSATE SODIUM 50 MG AND SENNOSIDES 8.6 MG 1 TABLET: 8.6; 5 TABLET, FILM COATED ORAL at 08:30

## 2021-09-01 RX ADMIN — CEFAZOLIN 3000 MG: 10 INJECTION, POWDER, FOR SOLUTION INTRAVENOUS at 03:30

## 2021-09-01 RX ADMIN — DOCUSATE SODIUM 50 MG AND SENNOSIDES 8.6 MG 1 TABLET: 8.6; 5 TABLET, FILM COATED ORAL at 20:28

## 2021-09-01 RX ADMIN — PIPERACILLIN AND TAZOBACTAM 3375 MG: 3; .375 INJECTION, POWDER, LYOPHILIZED, FOR SOLUTION INTRAVENOUS at 08:37

## 2021-09-01 RX ADMIN — INSULIN LISPRO 6 UNITS: 100 INJECTION, SOLUTION INTRAVENOUS; SUBCUTANEOUS at 16:01

## 2021-09-01 RX ADMIN — OXYCODONE HYDROCHLORIDE 10 MG: 10 TABLET ORAL at 22:30

## 2021-09-01 RX ADMIN — HYDROCORTISONE SODIUM SUCCINATE 50 MG: 100 INJECTION, POWDER, FOR SOLUTION INTRAMUSCULAR; INTRAVENOUS at 22:30

## 2021-09-01 RX ADMIN — Medication 5 ML: at 08:46

## 2021-09-01 RX ADMIN — AMIODARONE HYDROCHLORIDE 200 MG: 200 TABLET ORAL at 08:30

## 2021-09-01 RX ADMIN — SODIUM CHLORIDE, PRESERVATIVE FREE 10 ML: 5 INJECTION INTRAVENOUS at 08:45

## 2021-09-01 RX ADMIN — ENOXAPARIN SODIUM 40 MG: 40 INJECTION SUBCUTANEOUS at 08:31

## 2021-09-01 RX ADMIN — ASPIRIN 81 MG: 81 TABLET, COATED ORAL at 08:31

## 2021-09-01 RX ADMIN — MUPIROCIN: 20 OINTMENT TOPICAL at 20:29

## 2021-09-01 RX ADMIN — INSULIN GLARGINE 23 UNITS: 100 INJECTION, SOLUTION SUBCUTANEOUS at 20:25

## 2021-09-01 RX ADMIN — INSULIN LISPRO 3 UNITS: 100 INJECTION, SOLUTION INTRAVENOUS; SUBCUTANEOUS at 20:25

## 2021-09-01 RX ADMIN — INSULIN LISPRO 6 UNITS: 100 INJECTION, SOLUTION INTRAVENOUS; SUBCUTANEOUS at 11:46

## 2021-09-01 RX ADMIN — TAMSULOSIN HYDROCHLORIDE 0.4 MG: 0.4 CAPSULE ORAL at 08:31

## 2021-09-01 RX ADMIN — HYDROCORTISONE SODIUM SUCCINATE 50 MG: 100 INJECTION, POWDER, FOR SOLUTION INTRAMUSCULAR; INTRAVENOUS at 13:38

## 2021-09-01 ASSESSMENT — PAIN SCALES - GENERAL
PAINLEVEL_OUTOF10: 9
PAINLEVEL_OUTOF10: 0
PAINLEVEL_OUTOF10: 8
PAINLEVEL_OUTOF10: 9
PAINLEVEL_OUTOF10: 8
PAINLEVEL_OUTOF10: 9
PAINLEVEL_OUTOF10: 5
PAINLEVEL_OUTOF10: 9
PAINLEVEL_OUTOF10: 0
PAINLEVEL_OUTOF10: 6
PAINLEVEL_OUTOF10: 9
PAINLEVEL_OUTOF10: 0
PAINLEVEL_OUTOF10: 6
PAINLEVEL_OUTOF10: 10
PAINLEVEL_OUTOF10: 5
PAINLEVEL_OUTOF10: 7
PAINLEVEL_OUTOF10: 0
PAINLEVEL_OUTOF10: 9

## 2021-09-01 ASSESSMENT — PAIN DESCRIPTION - LOCATION
LOCATION: STERNUM
LOCATION: STERNUM
LOCATION: CHEST;STERNUM
LOCATION: STERNUM
LOCATION: CHEST;STERNUM
LOCATION: STERNUM
LOCATION: CHEST
LOCATION: STERNUM

## 2021-09-01 ASSESSMENT — PAIN DESCRIPTION - PAIN TYPE
TYPE: ACUTE PAIN;SURGICAL PAIN
TYPE: SURGICAL PAIN;ACUTE PAIN
TYPE: ACUTE PAIN;SURGICAL PAIN

## 2021-09-01 ASSESSMENT — PAIN DESCRIPTION - FREQUENCY
FREQUENCY: CONTINUOUS

## 2021-09-01 ASSESSMENT — PAIN DESCRIPTION - ONSET
ONSET: ON-GOING

## 2021-09-01 ASSESSMENT — PAIN DESCRIPTION - PROGRESSION
CLINICAL_PROGRESSION: GRADUALLY WORSENING

## 2021-09-01 ASSESSMENT — PAIN DESCRIPTION - ORIENTATION
ORIENTATION: MID;INNER
ORIENTATION: MID
ORIENTATION: MID;INNER

## 2021-09-01 ASSESSMENT — PAIN DESCRIPTION - DESCRIPTORS
DESCRIPTORS: SORE
DESCRIPTORS: ACHING;CONSTANT;DISCOMFORT

## 2021-09-01 NOTE — PROGRESS NOTES
Patient placed on bi-pap for two hours on. Will remove at 1530 for two hours on. Will continue to monitor.

## 2021-09-01 NOTE — PROGRESS NOTES
CVICU Progress Note    Name: Marely Vargas  MRN: [id number]    CC: Postoperative Critical Care Management     LVEF: severe ~20%   RVF:  Mild      Important/Relevant PMH/PSH: HFrEF, HTN, HLD, DMII, former smoker, ETOH       Procedure/Surgeries: 8/30/2021 Sternotomy/AVR with 27mm Mane Inspiris bioprosthetic/Mitral valve repair by decalcification of the anterior leaflet of the mitral valve/Reduction aortoplasty/CABG x 2 (LIMA-LAD, SVG-Diag 1)/DERRICK exclusion with 35 mm AtriClip/Rigid internal fixation of the sternum with KLS plates x 22 modifier     Pacing wires: Atrial and Ventricular         Intake/Output Summary (Last 24 hours) at 9/1/2021 0803  Last data filed at 9/1/2021 0700  Gross per 24 hour   Intake 1639.8 ml   Output 1800 ml   Net -160.2 ml       Recent Labs     08/31/21  0408 08/31/21  1600 09/01/21  0400   WBC 27.6* 30.4* 28.2*   HGB 13.4 12.0* 11.2*   HCT 42.8 36.8* 35.2*    129* 116*      Lab Results   Component Value Date     09/01/2021    K 5.2 09/01/2021    K 4.0 06/16/2021     09/01/2021    CO2 22 09/01/2021    BUN 22 09/01/2021    CREATININE 1.1 09/01/2021    GLUCOSE 213 09/01/2021    GLUCOSE 259 04/13/2012    CALCIUM 7.8 09/01/2021         Physical Exam:    /62   Pulse 91   Temp 99.3 °F (37.4 °C)   Resp (!) 33   Ht 6' 3\" (1.905 m)   Wt 283 lb (128.4 kg)   SpO2 95%   BMI 35.37 kg/m²       CXR Findings: 9/1/2021      -- CXR personally viewed and interpreted by ICU Nurse Practitioner, final readings per radiology pending     General: Awake, alert. C/o incisional pain   Eyes: PERRL, anicteric   Pulmonary: Diminished bibasilar.  No wheezes, no accessory muscle use noted on 8L HFNC  Cardiovascular:  RRR, no heaves or thrills on palpation  Tele: SR  Abdomen: Soft, nontender, + BS   Extremities: Palpable pulses all extremities, no edema   Neurologic/Psych: A&Ox3, LYMAN to command   Skin: Warm and dry  Incisions: MSI with celina dressing intact, LLE SVG sites MAURILIO well approximated      Assessment/Plan: POD #2  1. VHD, CAD S/p AVR, MVr, CABGx2, LAAC  - ASA, Lipitor, start BB once off inotropic support   - Pl drainsx2 remain to sxn   - IV Amio changed to po for afib prophylaxis   - Daily lovenox for VTE prophylaxis  - picc line ordered      2. Acute Pulmonary Insufficiency Following Surgery    - Expected 2/2 surgery  - Extubated POD1, now requiring 8L HFNC, intermittent bipap. Empiric zosyn. Dose lasix prn. Encourage IS, Ezpap per RT, ambulate      3. Acute on chronic systolic heart failure  - ICM; EF ~20%  -DOS 0.09units/min vasopressin, 16mcq/min epinephrine and 5 mcq/kg/min dobutamine  -POD1- hemodynamics much improved, lactic downtrending, creatinine and lfts NML-- down to 0.05 units vasopressin, 10mcq/min epinephrine, dobutamine up to 7.5mcq. CI 2.2, CO 5.6, SVR 1278, mixed venous 83  -POD2-shock resolved, off vasopressin, epinephrine, remains on dobutamin--plan to slowly wean over next few days then introduce heart failure meds      4. Lactic acidosis  -resolved      5. Acute Post Operative Pain   - incisional pain. Prn oxycodone     6. Uncontrolled DMII  - Hemoglobin A1c 8.0  - SSI/nightly lantus         VTE Prophylaxis: Pharmacologic/Mechanical: Yes, SCDs/ daily lovenox    Line infection prevention: Can CVC or arterial line be removed: No   Continued need for urinary catheter: Yes - clinical indication: Patient post major surgery requiring fluid balance and input and output measurement.     Dispo: CVICU until oxygenation improves     Electronically signed by SHIRA García - CNP on 9/1/2021 at 8:03 AM

## 2021-09-01 NOTE — PROGRESS NOTES
Date: 8/31/2021    Time: 10:47 PM    Patient Placed On BIPAP/CPAP/ Non-Invasive Ventilation? No    If no must comment. Facial area red/color change? No           If YES are Blister/Lesion present? No   If yes must notify nursing staff  BIPAP/CPAP skin barrier?   Yes    Skin barrier type:mepilexlite       Comments:        Jaime Perkins RCP

## 2021-09-01 NOTE — PROGRESS NOTES
Physical Therapy  Physical Therapy Initial Assessment     Name: Edgardo Short  : 1960  MRN: [id number]      Date of Service: 2021    Evaluating PT:  Betty Maciel PT, DPT [id number]    Room #:  7526/3102-J  Diagnosis:  CAD in native artery [I25.10]  PMHx/PSHx:  CHF, DM, HTN  Procedure/Surgery:   AVR, MV repair, CABG x 2  Precautions:  Falls, Sternal, O2, Chest tube  Equipment Needs:  TBD    SUBJECTIVE:    Pt lives alone in a 1st floor apartment with 1 stairs to enter and no rail. Pt ambulated without device and was independent PTA. OBJECTIVE:   Initial Evaluation  Date: 21 Treatment Short Term/ Long Term   Goals   AM-PAC 6 Clicks 86/60     Was pt agreeable to Eval/treatment? Yes     Does pt have pain?  Surgical pain but no rating given     Bed Mobility  Rolling: NT  Supine to sit: ModA x 2 with HOB elevated  Sit to supine: NT  Scooting: MaxA  Elias   Transfers Sit to stand: [address] [employer] [place] chair  Stand to sit: ModA  Stand pivot: Elias no device  Independent   Ambulation   40 feet with Elias no device  >400 feet Independently   Stair negotiation: ascended and descended NT  >4 steps with 1 rail Mod Independent   ROM BUE:  Defer to OT note  BLE:  WFL     Strength BUE:  Defer to OT note  BLE:  4/5  Increase by 1/3 MMT grade   Balance Sitting EOB:  Elias  Dynamic Standing:  Elias no device  Sitting EOB:  Independent  Dynamic Standing:  Independent     Pt is A & O x 4  CAM-ICU: NT  RASS: 0  Sensation:  No reported paresthesias  Edema:  None    Vitals:  Heart Rate at rest 106 bpm Heart Rate post session 104 bpm   SpO2 at rest -% SpO2 post session 95%   Blood Pressure at rest 139/81 mmHg Blood Pressure post session 133/107 mmHg       Functional Status Score-Intensive Care Unit (FSS-ICU)   Rolling -/   Supine to sit transfer 2/7   Unsupported sitting  4/   Sit to stand transfers 3/7   Ambulation    Total  10/35     Therapeutic Exercises:  NA    Patient education  Pt educated on safety    Patient response to education:   Pt verbalized understanding Pt demonstrated skill Pt requires further education in this area   x x x     ASSESSMENT:    Conditions Requiring Skilled Therapeutic Intervention:    [x]Decreased strength     []Decreased ROM  [x]Decreased functional mobility  [x]Decreased balance   [x]Decreased endurance   []Decreased posture  []Decreased sensation  []Decreased coordination   []Decreased vision  []Decreased safety awareness   [x]Increased pain       Comments:  NP reported pt was stable for session. Pt was in bed upon arrival, agreeable to initial evaluation. Pt was educated on sternal precautions and PLB prior to activity. Increased assistance provided for bed mobility due to deconditioning and precautions. Pt completed shuffled steps to chair initially with one LOB that required assistance to correct. After a seated rest break, pt stood from chair with increased assistance due to lower surface height. Pt ambulated with flexed posture, wide TORRIE and shuffled steps. One LOB while ambulating. Fatigue limited activity. Pt was left in chair with all needs met and call light in reach. All lines remained intact. Treatment:  Patient practiced and was instructed in the following treatment:     Bed mobility training - pt given verbal and tactile cues to facilitate proper sequencing and safety during supine>sit as well as provided with physical assistance.  Sitting EOB for >5 minutes for upright tolerance, postural awareness and BLE ROM   Transfer training - pt was given verbal and tactile cues to facilitate proper hand placement, technique and safety during sit to stand and stand to sit as well as provided with physical assistance.  Gait training- pt was given verbal and tactile cues to facilitate safety and balance during ambulation as well as provided with physical assistance. Pt's/ family goals   1. Return home    Prognosis is good for reaching above PT goals.     Patient and or family understand(s) diagnosis, prognosis, and plan of care. yes    PHYSICAL THERAPY PLAN OF CARE:    PT POC is established based on physician order and patient diagnosis     Referring provider/PT Order:  Allegra Dickey MD/08/31/21 0600 PT eval and treat  Diagnosis:  CAD in native artery [I25.10]  Specific instructions for next treatment:  Progress activity    Current Treatment Recommendations:     [x] Strengthening to improve independence with functional mobility   [] ROM to improve independence with functional mobility   [x] Balance Training to improve static/dynamic balance and to reduce fall risk  [x] Endurance Training to improve activity tolerance during functional mobility   [x] Transfer Training to improve safety and independence with all functional transfers   [x] Gait Training to improve gait mechanics, endurance and asses need for appropriate assistive device  [x] Stair Training in preparation for safe discharge home and/or into the community   [] Positioning to prevent skin breakdown and contractures  [x] Safety and Education Training   [x] Patient/Caregiver Education   [] HEP  [] Other     PT long term treatment goals are located in above grid    Frequency of treatments: 2-5x/week x 1-2 weeks. Time in  1250  Time out  1330    Total Treatment Time  25 minutes     Evaluation Time includes thorough review of current medical information, gathering information on past medical history/social history and prior level of function, completion of standardized testing/informal observation of tasks, assessment of data and education on plan of care and goals.     CPT codes:  [] Low Complexity PT evaluation 10543  [x] Moderate Complexity PT evaluation 15070  [] High Complexity PT evaluation 85541  [] PT Re-evaluation 34393  [] Gait training 96013 - minutes  [] Manual therapy 49765 - minutes  [x] Therapeutic activities 61216 25 minutes  [] Therapeutic exercises 35253 - minutes  [] Neuromuscular reeducation 95146 - minutes Merrill Dhaliwal, DPT  [id number]

## 2021-09-01 NOTE — PLAN OF CARE
Problem:  Activity Intolerance:  Goal: Able to perform prescribed physical activity  Description: Able to perform prescribed physical activity  Outcome: Met This Shift  Goal: Ability to tolerate increased activity will improve  Description: Ability to tolerate increased activity will improve  Outcome: Met This Shift     Problem: Anxiety:  Goal: Level of anxiety will decrease  Description: Level of anxiety will decrease  Outcome: Met This Shift     Problem: Pain:  Goal: Pain level will decrease  Description: Pain level will decrease  Outcome: Met This Shift     Problem: Skin Integrity:  Goal: Will show no infection signs and symptoms  Description: Will show no infection signs and symptoms  Outcome: Met This Shift  Goal: Absence of new skin breakdown  Description: Absence of new skin breakdown  Outcome: Met This Shift     Problem: Pain:  Goal: Pain level will decrease  Description: Pain level will decrease  Outcome: Met This Shift

## 2021-09-01 NOTE — PROGRESS NOTES
no focal motor deficits apparent      Intake/Output Summary (Last 24 hours) at 9/1/2021 1058  Last data filed at 9/1/2021 1000  Gross per 24 hour   Intake 1691.8 ml   Output 1975 ml   Net -283.2 ml       Weight:   Wt Readings from Last 3 Encounters:   08/30/21 283 lb (128.4 kg)   08/26/21 283 lb 3 oz (128.5 kg)   08/10/21 282 lb (127.9 kg)     Current Inpatient Medications:   lidocaine PF  5 mL IntraDERmal Once    sodium chloride flush  5-40 mL IntraVENous 2 times per day    heparin flush  3 mL IntraVENous 2 times per day    piperacillin-tazobactam  3,375 mg IntraVENous Q8H    amiodarone  200 mg Oral TID    enoxaparin  40 mg SubCUTAneous Daily    insulin lispro  0-18 Units SubCUTAneous TID WC    insulin lispro  0-9 Units SubCUTAneous Nightly    hydrocortisone sodium succinate PF  50 mg IntraVENous Q8H    atorvastatin  40 mg Oral Nightly    sodium chloride flush  10 mL IntraVENous 2 times per day    aspirin  81 mg Oral Daily    magnesium oxide  400 mg Oral Daily    mupirocin   Nasal BID    sennosides-docusate sodium  1 tablet Oral BID    pantoprazole  40 mg Oral Daily    ipratropium-albuterol  1 ampule Inhalation Q4H WA    insulin glargine  0.15 Units/kg SubCUTAneous Nightly    tamsulosin  0.4 mg Oral Daily       IV Infusions (if any):   sodium chloride      sodium chloride 30 mL/hr (08/31/21 0600)    sodium chloride      sodium chloride      dextrose      DOBUTamine 5 mcg/kg/min (09/01/21 1012)       DIAGNOSTIC/ LABORATORY DATA:  Labs:   CBC:   Recent Labs     08/31/21  1600 09/01/21  0400   WBC 30.4* 28.2*   HGB 12.0* 11.2*   HCT 36.8* 35.2*   * 116*     BMP:   Recent Labs     08/31/21  1600 09/01/21  0400    134   K 4.4 5.2*   CO2 22 22   BUN 18 22   CREATININE 1.0 1.1   LABGLOM >60 >60   CALCIUM 8.2* 7.8*     Mag:   Recent Labs     08/30/21  1204 09/01/21  0400   MG 2.1 2.1     Phos: No results for input(s): PHOS in the last 72 hours.   TFT: No results found for: TSH, D1ZWYKA, improved  13. Leukocytosis post op   14.  BPH    - Care per CTS  - Weaning pressors (only on dobutamine today) / GDMT on hold until hemodynamics stabilize off pressors  - Monitor H/H and BMP  - Telemetry reviewed (SR; on amiodarone)  - Serial echocardiograms      Oleg Chatman MD  [facility] ([facility]) Cardiology

## 2021-09-01 NOTE — PROGRESS NOTES
facilitation of postural control with dynamic challenges during ADLs . Recommended Adaptive Equipment:  LB dressing AE, raised commode seat and shower seat as needed pending progress. Home Living: Pt lives alone  in a first floor apt with 1 step(s) to enter and no rail(s); bed/bath on first floor  Bathroom setup: walk in shower with seat; standard height commode  Equipment owned: shower saet    Prior Level of Function: IND with ADLs;  IND with IADLs. No device for ambulation. Driving: yes  Occupation: [employer]    Pain Level: pt c/o chest pain with mobility  this session; does not rate    Cognition: A&O: 4/4    Follows 1-2 step commands appropriately. Memory: Good   Comprehension Good   Problem solving: Fair+/Good   Judgement/safety: Fair+/Good               Communication skills: WFL           Vision: WFL               Glasses:no                                                   Hearing: WFL     RASS: 0  CAM-ICU: (NT) Delirium     UE Assessment:  Hand Dominance: Right [x]  Left []     ROM Strength STM goal: PRN   RUE  Grossly WFL within precautions Not formally tested; grossly WFL              WNL for ADLS     LUE Grossly WFL within precautions Not formally tested; grossly WFL              WNL for ADLS       Sensation: No c/o numbness or tingling in extremities   Tone: WNL   Edema: WFL     Functional Assessment: AM-PAC Daily Activity Raw Score: 14/24   Initial Eval Status  Date: 9/1 Treatment Status  Date: STG=LTG  Time Frame: 5-7 days   Feeding S; set up                       IND  while seated up in chair to increase activity tolerance        Grooming Mod A                       Miguelito   while standing sink level demonstrating F tolerance; F balance.      UB dressing/bathing Max A                       Miguelito   demonstrating G knowledge of precautions during tasks     LB dressing/bathing Dep    Mod A  after instruction on LB dressing AE for safe reach within precautions Miguelito  using AE as needed for safe reach/ energy conservation       Toileting NT                       Miguelito     Bed Mobility  Supine to sit:   Mod A+2    Sit to supine: NT                       Min A  in prep of ADL tasks & transfers   Functional Transfers Sit to stand: Min A  from higher bed surface; Mod A from lower chair surface    Stand to sit: Mod A                       Miguelito  sit<>stand/functional bathroom transfers using AD/DME as needed for balance and safety   Functional Mobility Min A  no device; Shuffled steps                        Miguelito   functional/bathroom mobility using AD as needed & demonstrating G safety     Balance Sitting:     Static:  SBA    Dynamic:Min A  Standing: Min A  Miguelito dynamic sitting balance; Miguelito dynamic standing balance  during ADL tasks & transfers   Endurance/Activity Tolerance   F tolerance with moderate activity. G   tolerance with moderate activity/self care routine   Visual/  Perceptual               WFL                          Vitals:   HR at rest: 103 bpm HR at end of session: 116 bpm   Spo2 at rest:95% Spo2 at end of session 95%   BP at rest:139/81 mmHg BP at end of session 133/107 mmHg       Treatment: OT intervention provided this date includes:  Functional mobility: Instruction on sternal precautions to facilitate safe bed mobility & functional transfers. Pt required 2 person assist for safe mobility due to complexity of medical condition, medical lines and deconditioning. HOB elevated to assist. Pt required min cues to maintain precautions. ADL retraining: Instruction on adapted dressing techniques/equipment (reacher, sock aid) to maintain sternal precautions during ADLs. Pt demo G follow through. Energy Conservation training: Education on postural awareness/positioning and breathing techniques to improve overall tolerance and participation in self care routine. Pt demonstrated fair tolerance.   Review of recommended DME for fall prevention, bathroom safety & energy conservation. Pt/Family Education: Instruction with handouts on energy conservation and sternal precautions during functional activities for safe return home. Pt/family demonstrates G understanding. Line management and environmental modifications made prior to and end of session to ensure patient safety and to increase efficiency of session. Skilled monitoring of HR, O2 saturation, blood pressure and patient's response to activity performed throughout session. Comments: OK from RN to see patient. Upon arrival, patient supine in bed, agreeable to session. At end of session, patient left seated up in chair. Call light within reach, all lines and tubes intact. Pt instructed on use of call light for assistance and fall prevention. Patient presents with decreased activity tolerance, dynamic balance, functional mobility limiting completion of ADLs and safety. Pt can benefit from continued skilled OT to increase safety, functional independence and quality of life. Rehab Potential: Good for established goals    Patient / Family Goal: return to PLOF    Patient and/or family were instructed/educated on diagnosis, prognosis/goals and plan of care. Pt demonstrated G understanding. [] Malnutrition indicators have been identified and nursing has been notified to ensure a dietitian consult is ordered. Evaluation Complexity: Moderate    · History: Expanded chart review of consults, imaging, and psychosocial history related to current functional performance. · Exam: 5+ performance deficits identified limiting functional independence and safe return home   · Assistance/Modification: Min/mod assistance or modifications required to perform tasks. May have comorbidities that affect occupational performance.     Time In:1300              Time Out: 1338       Total Treatment Time: 23          Min Units   OT Eval Low 06836     OT Eval Medium 37953 X    OT Eval High [id number]     OT Re-Eval [id number] Therapeutic Ex [id number]     Therapeutic Activities 91607 15 1   ADL/Self Care 08196 8 1   Orthotic Management 33624     Neuro Re-Ed 40906     Non-Billable Time       Evaluation time includes thorough review of current medical information, gathering information on past medical history/social history and prior level of function, completion of standardized testing/informal observation of tasks, assessment of data and development of POC/Goals.      Katiuska Martin, OTR/L [id number]

## 2021-09-01 NOTE — PROGRESS NOTES
Vascular Access Procedure Note    Procedure Date:   9/1/2021    Pre-procedure Verification/Time-Out:  The proposed risks versus benefits of this procedure were discussed in detail by the physician with patient. written consent was obtained from the patient. Relevant documentation was reviewed prior to procedure including signed consent form and medications. All necessary equipment for procedure is available at time of procedure yes. An audible time out was done at 1035AM by team members, correctly identifying patients name, medical record number, correct side, correct site, and correct procedure to be performed with registered nurse members of the procedure team all in agreement.         Indication for Procedure  Reason for Insertion: other amio gtt/ remove triple lumen    ASA Assessment (Required for Moderate & Deep Sedation):      Procedure:   Reason for Consultation: power PICC line    Clinician Performing Procedure:   Sherly Johnson rn    Assistant:none    Sedation:   Analgesia Used: lidocaine 1%    Procedure Details/Findings:  Catheter Swedish Size: 5.5  Lot Number: [id number]  Product #: [id number]  Expiration Date: 05/31/2022  Maximum Barrier Precautions: cap, eye shield, full body drape, gloves, gown, handwashing and mask  Skin Prep: chlorhexidine  Technique: modified seldinger and ultrasound guided with VPS  Attempts: 1  Exposed (cm): 2  Total (cm): 44  Placement Site: left brachial vein  Vessel Size: 0.58  Dressing: securement device, transparent dressing and biopatch  Blood Return: Yes   Ultrasound Guidance: Yes   Arm Circumference Mid-Bicep (cm): 33  Chest X-Ray Ordered: chest x-ray  End Placement: svc    Complications:   none     Post-operative Condition:  stable  Patient Tolerated Procedure: well     Comments/Post-operative Education:   Post Procedure Interventions: no blood pressure sign placed above bed    Juan Cee RN  09/01/21  11:17 AM

## 2021-09-01 NOTE — PROGRESS NOTES
Patient removed from bi-pap and placed on 8 liters. Will place the patient back on bipap at 1730. Will continue to monitor.

## 2021-09-01 NOTE — CARE COORDINATION
SOCIAL WORK/CASEMANAGEMENT TRANSITION OF CARE [id number] [facility], [address], [place], [phone]): I spoke with Abraham Gregorio the daughter. Pt lives alone and was independent and drove pta. He is not a . No hhc or dme pta. Pt has danielle and her two brothers. Lamar Rubalcava said the family is very dysfunctional and she talks with him but not sure if his sons do. She said no one will be able to stay with pt on discharge. I went over need for 24/7 care with hhc and ability to ambulate 400'. I was not able to talk with pt yesterday since he was just extubated when I went down to cvic. I will need to go over this with pt as well and provide sarah beth choices if he would qualify vs hhc and home alone. Sw/cm to follow. Per np pt to remain in cvic today.  BILLY Mcdonald  9/1/2021

## 2021-09-02 ENCOUNTER — APPOINTMENT (OUTPATIENT)
Dept: GENERAL RADIOLOGY | Age: 61
DRG: 163 | End: 2021-09-02
Attending: THORACIC SURGERY (CARDIOTHORACIC VASCULAR SURGERY)
Payer: COMMERCIAL

## 2021-09-02 LAB
ALBUMIN SERPL-MCNC: 3.1 G/DL (ref 3.5–5.2)
ALBUMIN SERPL-MCNC: 3.2 G/DL (ref 3.5–5.2)
ALP BLD-CCNC: 61 U/L (ref 40–129)
ALP BLD-CCNC: 84 U/L (ref 40–129)
ALT SERPL-CCNC: 10 U/L (ref 0–40)
ALT SERPL-CCNC: 12 U/L (ref 0–40)
ANION GAP SERPL CALCULATED.3IONS-SCNC: 10 MMOL/L (ref 7–16)
ANION GAP SERPL CALCULATED.3IONS-SCNC: 10 MMOL/L (ref 7–16)
AST SERPL-CCNC: 21 U/L (ref 0–39)
AST SERPL-CCNC: 24 U/L (ref 0–39)
B.E.: 1.1 MMOL/L (ref -3–3)
BILIRUB SERPL-MCNC: 0.6 MG/DL (ref 0–1.2)
BILIRUB SERPL-MCNC: 0.7 MG/DL (ref 0–1.2)
BUN BLDV-MCNC: 28 MG/DL (ref 6–23)
BUN BLDV-MCNC: 30 MG/DL (ref 6–23)
CALCIUM SERPL-MCNC: 8.1 MG/DL (ref 8.6–10.2)
CALCIUM SERPL-MCNC: 8.7 MG/DL (ref 8.6–10.2)
CHLORIDE BLD-SCNC: 97 MMOL/L (ref 98–107)
CHLORIDE BLD-SCNC: 98 MMOL/L (ref 98–107)
CO2: 25 MMOL/L (ref 22–29)
CO2: 25 MMOL/L (ref 22–29)
COHB: 0.5 % (ref 0–1.5)
CREAT SERPL-MCNC: 1 MG/DL (ref 0.7–1.2)
CREAT SERPL-MCNC: 1.2 MG/DL (ref 0.7–1.2)
CRITICAL: ABNORMAL
DATE ANALYZED: ABNORMAL
DATE OF COLLECTION: ABNORMAL
GFR AFRICAN AMERICAN: >60
GFR AFRICAN AMERICAN: >60
GFR NON-AFRICAN AMERICAN: >60 ML/MIN/1.73
GFR NON-AFRICAN AMERICAN: >60 ML/MIN/1.73
GLUCOSE BLD-MCNC: 240 MG/DL (ref 74–99)
GLUCOSE BLD-MCNC: 268 MG/DL (ref 74–99)
HCO3: 25.2 MMOL/L (ref 22–26)
HCT VFR BLD CALC: 35.8 % (ref 37–54)
HCT VFR BLD CALC: 37.2 % (ref 37–54)
HEMOGLOBIN: 11.2 G/DL (ref 12.5–16.5)
HEMOGLOBIN: 11.8 G/DL (ref 12.5–16.5)
HHB: 6.6 % (ref 0–5)
LAB: ABNORMAL
Lab: ABNORMAL
MAGNESIUM: 2.5 MG/DL (ref 1.6–2.6)
MCH RBC QN AUTO: 30.9 PG (ref 26–35)
MCH RBC QN AUTO: 31 PG (ref 26–35)
MCHC RBC AUTO-ENTMCNC: 31.3 % (ref 32–34.5)
MCHC RBC AUTO-ENTMCNC: 31.7 % (ref 32–34.5)
MCV RBC AUTO: 97.6 FL (ref 80–99.9)
MCV RBC AUTO: 98.6 FL (ref 80–99.9)
METER GLUCOSE: 167 MG/DL (ref 74–99)
METER GLUCOSE: 226 MG/DL (ref 74–99)
METER GLUCOSE: 230 MG/DL (ref 74–99)
METER GLUCOSE: 243 MG/DL (ref 74–99)
METHB: 0.3 % (ref 0–1.5)
MODE: ABNORMAL
O2 SATURATION: 93.3 % (ref 92–98.5)
O2HB: 92.6 % (ref 94–97)
OPERATOR ID: ABNORMAL
PATIENT TEMP: 37 C
PCO2: 38.5 MMHG (ref 35–45)
PDW BLD-RTO: 16.3 FL (ref 11.5–15)
PDW BLD-RTO: 16.5 FL (ref 11.5–15)
PH BLOOD GAS: 7.43 (ref 7.35–7.45)
PLATELET # BLD: 107 E9/L (ref 130–450)
PLATELET # BLD: 118 E9/L (ref 130–450)
PMV BLD AUTO: 11.5 FL (ref 7–12)
PMV BLD AUTO: 11.7 FL (ref 7–12)
PO2: 69.5 MMHG (ref 75–100)
POTASSIUM SERPL-SCNC: 4.3 MMOL/L (ref 3.5–5)
POTASSIUM SERPL-SCNC: 5.2 MMOL/L (ref 3.5–5)
RBC # BLD: 3.63 E12/L (ref 3.8–5.8)
RBC # BLD: 3.81 E12/L (ref 3.8–5.8)
SODIUM BLD-SCNC: 132 MMOL/L (ref 132–146)
SODIUM BLD-SCNC: 133 MMOL/L (ref 132–146)
SOURCE, BLOOD GAS: ABNORMAL
THB: 12.4 G/DL (ref 11.5–16.5)
TIME ANALYZED: 434
TOTAL PROTEIN: 5.7 G/DL (ref 6.4–8.3)
TOTAL PROTEIN: 6.1 G/DL (ref 6.4–8.3)
WBC # BLD: 19 E9/L (ref 4.5–11.5)
WBC # BLD: 19.7 E9/L (ref 4.5–11.5)

## 2021-09-02 PROCEDURE — 6370000000 HC RX 637 (ALT 250 FOR IP): Performed by: THORACIC SURGERY (CARDIOTHORACIC VASCULAR SURGERY)

## 2021-09-02 PROCEDURE — 2580000003 HC RX 258

## 2021-09-02 PROCEDURE — 6360000002 HC RX W HCPCS: Performed by: NURSE PRACTITIONER

## 2021-09-02 PROCEDURE — 6370000000 HC RX 637 (ALT 250 FOR IP): Performed by: NURSE PRACTITIONER

## 2021-09-02 PROCEDURE — 94660 CPAP INITIATION&MGMT: CPT

## 2021-09-02 PROCEDURE — 2140000000 HC CCU INTERMEDIATE R&B

## 2021-09-02 PROCEDURE — 36415 COLL VENOUS BLD VENIPUNCTURE: CPT

## 2021-09-02 PROCEDURE — 2700000000 HC OXYGEN THERAPY PER DAY

## 2021-09-02 PROCEDURE — 6360000002 HC RX W HCPCS: Performed by: THORACIC SURGERY (CARDIOTHORACIC VASCULAR SURGERY)

## 2021-09-02 PROCEDURE — 71045 X-RAY EXAM CHEST 1 VIEW: CPT

## 2021-09-02 PROCEDURE — 85027 COMPLETE CBC AUTOMATED: CPT

## 2021-09-02 PROCEDURE — 94640 AIRWAY INHALATION TREATMENT: CPT

## 2021-09-02 PROCEDURE — 97530 THERAPEUTIC ACTIVITIES: CPT

## 2021-09-02 PROCEDURE — 99233 SBSQ HOSP IP/OBS HIGH 50: CPT | Performed by: INTERNAL MEDICINE

## 2021-09-02 PROCEDURE — 97535 SELF CARE MNGMENT TRAINING: CPT

## 2021-09-02 PROCEDURE — 83735 ASSAY OF MAGNESIUM: CPT

## 2021-09-02 PROCEDURE — 2580000003 HC RX 258: Performed by: NURSE PRACTITIONER

## 2021-09-02 PROCEDURE — 82962 GLUCOSE BLOOD TEST: CPT

## 2021-09-02 PROCEDURE — 82805 BLOOD GASES W/O2 SATURATION: CPT

## 2021-09-02 PROCEDURE — 99233 SBSQ HOSP IP/OBS HIGH 50: CPT | Performed by: NURSE PRACTITIONER

## 2021-09-02 PROCEDURE — 80053 COMPREHEN METABOLIC PANEL: CPT

## 2021-09-02 PROCEDURE — 2580000003 HC RX 258: Performed by: THORACIC SURGERY (CARDIOTHORACIC VASCULAR SURGERY)

## 2021-09-02 RX ORDER — FOLIC ACID 1 MG/1
1 TABLET ORAL DAILY
Status: DISCONTINUED | OUTPATIENT
Start: 2021-09-02 | End: 2021-09-07 | Stop reason: HOSPADM

## 2021-09-02 RX ORDER — INSULIN GLARGINE 100 [IU]/ML
28 INJECTION, SOLUTION SUBCUTANEOUS NIGHTLY
Status: DISCONTINUED | OUTPATIENT
Start: 2021-09-02 | End: 2021-09-04

## 2021-09-02 RX ORDER — POTASSIUM CHLORIDE 20 MEQ/1
20 TABLET, EXTENDED RELEASE ORAL PRN
Status: DISCONTINUED | OUTPATIENT
Start: 2021-09-02 | End: 2021-09-07 | Stop reason: HOSPADM

## 2021-09-02 RX ORDER — FERROUS SULFATE 325(65) MG
325 TABLET ORAL 2 TIMES DAILY WITH MEALS
Status: DISCONTINUED | OUTPATIENT
Start: 2021-09-02 | End: 2021-09-07 | Stop reason: HOSPADM

## 2021-09-02 RX ORDER — MORPHINE SULFATE 2 MG/ML
2 INJECTION, SOLUTION INTRAMUSCULAR; INTRAVENOUS EVERY 4 HOURS PRN
Status: DISCONTINUED | OUTPATIENT
Start: 2021-09-02 | End: 2021-09-07 | Stop reason: HOSPADM

## 2021-09-02 RX ORDER — SENNA AND DOCUSATE SODIUM 50; 8.6 MG/1; MG/1
1 TABLET, FILM COATED ORAL 2 TIMES DAILY
Status: DISCONTINUED | OUTPATIENT
Start: 2021-09-02 | End: 2021-09-07 | Stop reason: HOSPADM

## 2021-09-02 RX ORDER — OXYCODONE HYDROCHLORIDE AND ACETAMINOPHEN 5; 325 MG/1; MG/1
1 TABLET ORAL EVERY 4 HOURS PRN
Status: DISCONTINUED | OUTPATIENT
Start: 2021-09-02 | End: 2021-09-07 | Stop reason: HOSPADM

## 2021-09-02 RX ORDER — OXYCODONE HYDROCHLORIDE AND ACETAMINOPHEN 5; 325 MG/1; MG/1
2 TABLET ORAL EVERY 4 HOURS PRN
Status: DISCONTINUED | OUTPATIENT
Start: 2021-09-02 | End: 2021-09-07 | Stop reason: HOSPADM

## 2021-09-02 RX ORDER — ACETAMINOPHEN 325 MG/1
650 TABLET ORAL EVERY 4 HOURS PRN
Status: DISCONTINUED | OUTPATIENT
Start: 2021-09-02 | End: 2021-09-07 | Stop reason: HOSPADM

## 2021-09-02 RX ORDER — BISACODYL 10 MG
10 SUPPOSITORY, RECTAL RECTAL DAILY PRN
Status: DISCONTINUED | OUTPATIENT
Start: 2021-09-02 | End: 2021-09-07 | Stop reason: HOSPADM

## 2021-09-02 RX ORDER — ASCORBIC ACID 500 MG
500 TABLET ORAL 2 TIMES DAILY
Status: DISCONTINUED | OUTPATIENT
Start: 2021-09-02 | End: 2021-09-07 | Stop reason: HOSPADM

## 2021-09-02 RX ORDER — ASPIRIN 81 MG/1
81 TABLET ORAL DAILY
Status: DISCONTINUED | OUTPATIENT
Start: 2021-09-03 | End: 2021-09-07 | Stop reason: HOSPADM

## 2021-09-02 RX ORDER — MAGNESIUM SULFATE IN WATER 40 MG/ML
2000 INJECTION, SOLUTION INTRAVENOUS
Status: ACTIVE | OUTPATIENT
Start: 2021-09-02 | End: 2021-09-02

## 2021-09-02 RX ORDER — PANTOPRAZOLE SODIUM 40 MG/1
40 TABLET, DELAYED RELEASE ORAL DAILY
Status: DISCONTINUED | OUTPATIENT
Start: 2021-09-02 | End: 2021-09-07 | Stop reason: HOSPADM

## 2021-09-02 RX ADMIN — IPRATROPIUM BROMIDE AND ALBUTEROL SULFATE 1 AMPULE: .5; 2.5 SOLUTION RESPIRATORY (INHALATION) at 20:52

## 2021-09-02 RX ADMIN — PIPERACILLIN AND TAZOBACTAM 3375 MG: 3; .375 INJECTION, POWDER, LYOPHILIZED, FOR SOLUTION INTRAVENOUS at 08:12

## 2021-09-02 RX ADMIN — DOBUTAMINE IN DEXTROSE 3 MCG/KG/MIN: 400 INJECTION, SOLUTION INTRAVENOUS at 04:33

## 2021-09-02 RX ADMIN — PANTOPRAZOLE SODIUM 40 MG: 40 TABLET, DELAYED RELEASE ORAL at 20:06

## 2021-09-02 RX ADMIN — IPRATROPIUM BROMIDE AND ALBUTEROL SULFATE 1 AMPULE: .5; 2.5 SOLUTION RESPIRATORY (INHALATION) at 07:51

## 2021-09-02 RX ADMIN — INSULIN LISPRO 6 UNITS: 100 INJECTION, SOLUTION INTRAVENOUS; SUBCUTANEOUS at 11:37

## 2021-09-02 RX ADMIN — HEPARIN 300 UNITS: 100 SYRINGE at 20:19

## 2021-09-02 RX ADMIN — Medication 10 ML: at 20:19

## 2021-09-02 RX ADMIN — WATER 2 ML: 1 INJECTION INTRAMUSCULAR; INTRAVENOUS; SUBCUTANEOUS at 20:09

## 2021-09-02 RX ADMIN — Medication 400 MG: at 08:25

## 2021-09-02 RX ADMIN — HYDROCORTISONE SODIUM SUCCINATE 50 MG: 100 INJECTION, POWDER, FOR SOLUTION INTRAMUSCULAR; INTRAVENOUS at 20:09

## 2021-09-02 RX ADMIN — INSULIN LISPRO 2 UNITS: 100 INJECTION, SOLUTION INTRAVENOUS; SUBCUTANEOUS at 20:19

## 2021-09-02 RX ADMIN — DOCUSATE SODIUM 50 MG AND SENNOSIDES 8.6 MG 1 TABLET: 8.6; 5 TABLET, FILM COATED ORAL at 08:25

## 2021-09-02 RX ADMIN — OXYCODONE HYDROCHLORIDE AND ACETAMINOPHEN 500 MG: 500 TABLET ORAL at 22:57

## 2021-09-02 RX ADMIN — MUPIROCIN: 20 OINTMENT TOPICAL at 08:25

## 2021-09-02 RX ADMIN — MAGNESIUM HYDROXIDE 30 ML: 400 SUSPENSION ORAL at 20:06

## 2021-09-02 RX ADMIN — INSULIN GLARGINE 28 UNITS: 100 INJECTION, SOLUTION SUBCUTANEOUS at 20:21

## 2021-09-02 RX ADMIN — ATORVASTATIN CALCIUM 40 MG: 80 TABLET, FILM COATED ORAL at 20:18

## 2021-09-02 RX ADMIN — IPRATROPIUM BROMIDE AND ALBUTEROL SULFATE 1 AMPULE: .5; 2.5 SOLUTION RESPIRATORY (INHALATION) at 11:22

## 2021-09-02 RX ADMIN — HYDROCORTISONE SODIUM SUCCINATE 50 MG: 100 INJECTION, POWDER, FOR SOLUTION INTRAMUSCULAR; INTRAVENOUS at 06:59

## 2021-09-02 RX ADMIN — SODIUM CHLORIDE, PRESERVATIVE FREE 10 ML: 5 INJECTION INTRAVENOUS at 08:26

## 2021-09-02 RX ADMIN — OXYCODONE HYDROCHLORIDE 10 MG: 10 TABLET ORAL at 07:30

## 2021-09-02 RX ADMIN — FERROUS SULFATE TAB 325 MG (65 MG ELEMENTAL FE) 325 MG: 325 (65 FE) TAB at 20:06

## 2021-09-02 RX ADMIN — CALCIUM GLUCONATE 1000 MG: 98 INJECTION, SOLUTION INTRAVENOUS at 08:06

## 2021-09-02 RX ADMIN — AMIODARONE HYDROCHLORIDE 200 MG: 200 TABLET ORAL at 20:18

## 2021-09-02 RX ADMIN — OXYCODONE HYDROCHLORIDE AND ACETAMINOPHEN 2 TABLET: 5; 325 TABLET ORAL at 19:26

## 2021-09-02 RX ADMIN — ENOXAPARIN SODIUM 40 MG: 40 INJECTION SUBCUTANEOUS at 08:26

## 2021-09-02 RX ADMIN — OXYCODONE HYDROCHLORIDE 10 MG: 10 TABLET ORAL at 15:13

## 2021-09-02 RX ADMIN — INSULIN LISPRO 6 UNITS: 100 INJECTION, SOLUTION INTRAVENOUS; SUBCUTANEOUS at 16:54

## 2021-09-02 RX ADMIN — INSULIN LISPRO 6 UNITS: 100 INJECTION, SOLUTION INTRAVENOUS; SUBCUTANEOUS at 08:34

## 2021-09-02 RX ADMIN — TAMSULOSIN HYDROCHLORIDE 0.4 MG: 0.4 CAPSULE ORAL at 08:25

## 2021-09-02 RX ADMIN — OXYCODONE HYDROCHLORIDE AND ACETAMINOPHEN 2 TABLET: 5; 325 TABLET ORAL at 22:58

## 2021-09-02 RX ADMIN — MUPIROCIN: 20 OINTMENT TOPICAL at 20:18

## 2021-09-02 RX ADMIN — ASPIRIN 81 MG: 81 TABLET, COATED ORAL at 08:25

## 2021-09-02 RX ADMIN — FOLIC ACID 1 MG: 1 TABLET ORAL at 20:06

## 2021-09-02 RX ADMIN — AMIODARONE HYDROCHLORIDE 200 MG: 200 TABLET ORAL at 08:25

## 2021-09-02 RX ADMIN — PIPERACILLIN AND TAZOBACTAM 3375 MG: 3; .375 INJECTION, POWDER, LYOPHILIZED, FOR SOLUTION INTRAVENOUS at 00:00

## 2021-09-02 RX ADMIN — OXYCODONE HYDROCHLORIDE 10 MG: 10 TABLET ORAL at 03:08

## 2021-09-02 RX ADMIN — PIPERACILLIN AND TAZOBACTAM 3375 MG: 3; .375 INJECTION, POWDER, LYOPHILIZED, FOR SOLUTION INTRAVENOUS at 16:47

## 2021-09-02 RX ADMIN — BISACODYL 5 MG: 5 TABLET, COATED ORAL at 20:06

## 2021-09-02 RX ADMIN — PANTOPRAZOLE SODIUM 40 MG: 40 TABLET, DELAYED RELEASE ORAL at 08:25

## 2021-09-02 RX ADMIN — HEPARIN 300 UNITS: 100 SYRINGE at 08:26

## 2021-09-02 RX ADMIN — IPRATROPIUM BROMIDE AND ALBUTEROL SULFATE 1 AMPULE: .5; 2.5 SOLUTION RESPIRATORY (INHALATION) at 16:05

## 2021-09-02 RX ADMIN — FENTANYL CITRATE 25 MCG: 50 INJECTION, SOLUTION INTRAMUSCULAR; INTRAVENOUS at 17:02

## 2021-09-02 RX ADMIN — DOCUSATE SODIUM 50 MG AND SENNOSIDES 8.6 MG 1 TABLET: 8.6; 5 TABLET, FILM COATED ORAL at 20:18

## 2021-09-02 RX ADMIN — AMIODARONE HYDROCHLORIDE 200 MG: 200 TABLET ORAL at 13:34

## 2021-09-02 ASSESSMENT — PAIN DESCRIPTION - PROGRESSION
CLINICAL_PROGRESSION: GRADUALLY WORSENING

## 2021-09-02 ASSESSMENT — PAIN SCALES - GENERAL
PAINLEVEL_OUTOF10: 0
PAINLEVEL_OUTOF10: 5
PAINLEVEL_OUTOF10: 5
PAINLEVEL_OUTOF10: 8
PAINLEVEL_OUTOF10: 8
PAINLEVEL_OUTOF10: 0
PAINLEVEL_OUTOF10: 6
PAINLEVEL_OUTOF10: 0
PAINLEVEL_OUTOF10: 5
PAINLEVEL_OUTOF10: 6
PAINLEVEL_OUTOF10: 9
PAINLEVEL_OUTOF10: 8
PAINLEVEL_OUTOF10: 0
PAINLEVEL_OUTOF10: 0

## 2021-09-02 ASSESSMENT — PAIN DESCRIPTION - LOCATION: LOCATION: STERNUM

## 2021-09-02 ASSESSMENT — PAIN DESCRIPTION - DESCRIPTORS: DESCRIPTORS: ACHING;DISCOMFORT

## 2021-09-02 ASSESSMENT — PAIN DESCRIPTION - PAIN TYPE: TYPE: ACUTE PAIN;SURGICAL PAIN

## 2021-09-02 NOTE — PROGRESS NOTES
Physical Therapy  Physical Therapy Treatment Note    Name: Garrison Melara  : 1960  MRN: [id number]      Date of Service: 2021    Evaluating PT:  Jeremias Hicks PT, DPT [id number]    Room #:  0333/6862-C  Diagnosis:  CAD in native artery [I25.10]  PMHx/PSHx:  CHF, DM, HTN  Procedure/Surgery:   AVR, MV repair, CABG x 2  Precautions:  Falls, Sternal, O2, Chest tube  Equipment Needs:  TBD    SUBJECTIVE:    Pt lives alone in a 1st floor apartment with 1 stairs to enter and no rail. Pt ambulated without device and was independent PTA. OBJECTIVE:   Initial Evaluation  Date: 21 Treatment  21 Short Term/ Long Term   Goals   AM-PAC 6 Clicks     Was pt agreeable to Eval/treatment? Yes Yes    Does pt have pain?  Surgical pain but no rating given 4/10 surgical pain    Bed Mobility  Rolling: NT  Supine to sit: ModA x 2 with HOB elevated  Sit to supine: NT  Scooting: MaxA Rolling: NT  Supine to sit: ModA x 2 with HOB elevated  Sit to supine: NT  Scooting: MaxA Elias   Transfers Sit to stand: [address] [employer] [place] chair  Stand to sit: ModA  Stand pivot: Elias no device Sit to stand: Elias  Stand to sit: Elias  Stand pivot: Elias no device Independent   Ambulation   40 feet with Elias no device 125 feet with Elias no device >400 feet Independently   Stair negotiation: ascended and descended NT NT >4 steps with 1 rail Mod Independent   ROM BUE:  Defer to OT note  BLE:  WFL     Strength BUE:  Defer to OT note  BLE:  4/5  Increase by 1/3 MMT grade   Balance Sitting EOB:  Elias  Dynamic Standing:  Elias no device Sitting EOB:  Elias  Dynamic Standing:  Elias no device Sitting EOB:  Independent  Dynamic Standing:  Independent     Pt is A & O x 4  CAM-ICU: NT  RASS: 0  Sensation:  No reported paresthesias  Edema:  None    Vitals:  Heart Rate at rest 85 bpm Heart Rate post session 95 bpm   SpO2 at rest 95% SpO2 post session 97%   Blood Pressure at rest 138/88 mmHg Blood Pressure post session 147/116 mmHg       Functional Status Score-Intensive Care Unit (FSS-ICU)   Rolling -/7   Supine to sit transfer 2/7   Unsupported sitting  4/7   Sit to stand transfers 4/7   Ambulation 4/7   Total  14/35     Therapeutic Exercises:  NA    Patient education  Pt educated on safety    Patient response to education:   Pt verbalized understanding Pt demonstrated skill Pt requires further education in this area   x x x     ASSESSMENT:    Conditions Requiring Skilled Therapeutic Intervention:    [x]Decreased strength     []Decreased ROM  [x]Decreased functional mobility  [x]Decreased balance   [x]Decreased endurance   []Decreased posture  []Decreased sensation  []Decreased coordination   []Decreased vision  []Decreased safety awareness   [x]Increased pain       Comments:  NP reported pt was stable for session. Pt was in bed upon arrival, agreeable to treatment session. Reviewed sternal precautions prior to activity. Pt demonstrated improved mobility and tolerance to activity this session. Pt's gait was still mildly unsteady with flexed posture when fatigued. SOB noted with activity. Pt was left in chair with all needs met and call light in reach. All lines remained intact. Treatment:  Patient practiced and was instructed in the following treatment:     Bed mobility training - pt given verbal and tactile cues to facilitate proper sequencing and safety during supine>sit as well as provided with physical assistance.  Sitting EOB for >5 minutes for upright tolerance, postural awareness and BLE ROM   Transfer training - pt was given verbal and tactile cues to facilitate proper hand placement, technique and safety during sit to stand and stand to sit as well as provided with physical assistance.  Gait training- pt was given verbal and tactile cues to facilitate safety and balance during ambulation as well as provided with physical assistance. PLAN:    Patient is making good progress towards established goals.   Will continue with current

## 2021-09-02 NOTE — PROGRESS NOTES
OCCUPATIONAL THERAPY TREATMENT NOTE   [address] [place] [address]  [address] [place] [address]       Date:2021                                                               Patient Name: Surekha Mercer  MRN: [id number]  : 1960  Room: [address] [place] [address]    Evaluating OT: Rolanda Scott OTR/L [id number]     Referring Provider: JASMINE Shah   Specific Provider Orders/Date: OT eval and treat (21)        Diagnosis: AS/CAD/ICM/CHF      Surgery/Procedures:  AVR/MVR/CABG x 2      Pertinent Medical History: CHF, DM, HTN      *Precautions:  Fall Risk, sternal, O2, chest tube     Assessment of current deficits   [x]? Functional mobility            [x]?ADLs           [x]? Strength                  []?Cognition  [x]? Functional transfers          [x]? IADLs          [x]? Safety Awareness   [x]? Endurance  []? Fine Motor Coordination   [x]? Balance       []? Vision/perception    []? Sensation      []? Gross Motor Coordination [x]? ROM           []? Delirium                  []? Motor Control     []? Communication      OT PLAN OF CARE   OT POC based on physician orders, patient diagnosis and results of clinical assessment.        Frequency/Duration: 1-3 days/wk for 1-2 weeks PRN     Specific OT Treatment to include:   ADL retraining/adapted techniques and AE recommendations to increase functional independence within precautions                    Energy conservation techniques to improve tolerance for selfcare routine   Functional transfer/mobility training/DME recommendations for increased independence, safety and fall prevention         Patient/family education to increase safety and functional independence within precautions             Environmental modifications for safe mobility and completion of ADLs                             Therapeutic activity to improve functional performance during ADLs                                         Therapeutic exercise to improve tolerance and functional strength for ADLs   Balance retraining exercises/tasks for facilitation of postural control with dynamic challenges during ADLs .       Recommended Adaptive Equipment:  LB dressing AE, raised commode seat and shower seat as needed pending progress.      Home Living: Pt lives alone  in a first floor apt with 1 step(s) to enter and no rail(s); bed/bath on first floor  Bathroom setup: walk in shower with seat; standard height commode  Equipment owned: shower saet     Prior Level of Function: IND with ADLs;  IND with IADLs. No device for ambulation. Driving: yes  Occupation: [employer]     Pain Level: pt c/o chest pain with mobility  this session; does not rate     Cognition: A&O: 4/4    Follows 1-2 step commands appropriately. Memory: Good              Comprehension Good              Problem solving: Fair+/Good              Judgement/safety: Fair+/Good                 Communication skills: WFL              Vision: WFL                     Glasses:no                                                        Hearing: WFL                RASS: 0  CAM-ICU: (NT) Delirium      UE Assessment:  Hand Dominance: Right [x]?   Left []?       ROM Strength STM goal: PRN   RUE  Grossly WFL within precautions Not formally tested; grossly WFL              WNL for ADLS      LUE Grossly WFL within precautions Not formally tested; grossly WFL              WNL for ADLS         Sensation: No c/o numbness or tingling in extremities   Tone: WNL   Edema: WFL     Functional Assessment: AM-PAC Daily Activity Raw Score: 15/24    Initial Eval Status  Date: 9/1 Treatment Status  Date:9/2 STG=LTG  Time Frame: 5-7 days   Feeding S; set up  IND   Seated up in chair                      IND  while seated up in chair to increase activity tolerance         Grooming Mod A Min A  Standing sink level to perform grooming tasks                       Miguelito   while standing sink level demonstrating F tolerance; F balance.      UB dressing/bathing Max A  Max A                      Miguelito   demonstrating G knowledge of precautions during tasks      LB dressing/bathing Dep     Mod A  after instruction on LB dressing AE for safe reach within precautions NT     9/1 Mod A using AE                       Miguelito  using AE as needed for safe reach/ energy conservation        Toileting NT  NT                      Miguelito      Bed Mobility  Supine to sit:   Mod A+2     Sit to supine: NT Supine to sit   Mod A+2                       Min A  in prep of ADL tasks & transfers   Functional Transfers Sit to stand: Min A  from higher bed surface;     Mod A from lower chair surface     Stand to sit: Mod A Sit<>stand: Min A from higher bed and lower chair surface                       Miguelito  sit<>stand/functional bathroom transfers using AD/DME as needed for balance and safety   Functional Mobility Min A  no device; Shuffled steps Min A  Improved gait; fatigues quickly                        Miguelito   functional/bathroom mobility using AD as needed & demonstrating G safety      Balance Sitting:     Static:  SBA    Dynamic:Min A  Standing: Min A Sitting: SBA  Standing: Min A  Miguelito dynamic sitting balance; Miguelito dynamic standing balance  during ADL tasks & transfers   Endurance/Activity Tolerance    F tolerance with moderate activity.    Fair+ tolerance with moderate activity; sh ort breaks G   tolerance with moderate activity/self care routine   Visual/  Perceptual               WFL                               Vitals:   HR at rest: 87 bpm HR at end of session: 101 bpm   Spo2 at rest:95% Spo2 at end of session: 91%   BP at rest: 125/62 mmHg BP at end of session: 104/45 mmHg     Treatment: OT treatment provided this date   Bed mobility: Instruction on precautions prior to bed mobility to facilitate safe transfers and ADLS. HOB elevated to assist with mobility.    Balance retraining: Performed sitting/standing balance ex's with instruction to facilitate righting reactions with postural changes during ADLS. Pt demo fair tolerance; no LOB. Pt demo increased unsteadiness with fatigue. Energy conservation: Education on breathing techniques, pacing, work simplification strategies & recommended bathroom DME for safety and energy conservation during self care tasks and activities of daily living. Line management and environmental modifications made prior to and end of session to ensure patient safety and to increase efficiency of session. Skilled monitoring of HR, O2 saturation, blood pressure and patient's response to activity performed throughout session. Comments: OK from RN to see patient. Upon arrival, patient supine in bed, agreeable to session. Pt demo fair tolerance with good understanding of education/techniques. At end of session, patient left seated in chair and set up with meal tray. Call light within reach, all lines and tubes intact. Pt instructed on use of call light for assistance and fall prevention. Overall, pt presents with decreased activity tolerance, dynamic balance, functional mobility limiting completion of ADLs and safe return home. Pt can benefit from continued skilled OT to increase safety, functional independence & quality of life. · Pt has made G progress towards set goals.    · Continue with current plan of care       Time In:0905              Time Out: 8688         Total Treatment Time: 23      Treatment Charges: Mins Units   Ther Ex  18176     Manual Therapy 28498     Thera Activities 22159 15 1   ADL/Home Mgt 01453 8 1   Neuro Re-ed 64830     Orthotic manage/training  97328     Non-Billable Time         [place] Like, OTR/L [id number]

## 2021-09-02 NOTE — PROGRESS NOTES
CVICU Progress Note    Name: Kendra Larson  MRN: [id number]    CC: Postoperative Critical Care Management     LVEF: severe ~20%   RVF:  Mild      Important/Relevant PMH/PSH: HFrEF, HTN, HLD, DMII, former smoker, ETOH       Procedure/Surgeries: 8/30/2021 Sternotomy/AVR with 27mm Mane Inspiris bioprosthetic/Mitral valve repair by decalcification of the anterior leaflet of the mitral valve/Reduction aortoplasty/CABG x 2 (LIMA-LAD, SVG-Diag 1)/DERRICK exclusion with 35 mm AtriClip/Rigid internal fixation of the sternum with KLS plates x 22 modifier     Pacing wires: Atrial and Ventricular         Intake/Output Summary (Last 24 hours) at 9/2/2021 0755  Last data filed at 9/2/2021 0700  Gross per 24 hour   Intake 970.91 ml   Output 2200 ml   Net -1229.09 ml       Recent Labs     09/01/21  0400 09/01/21  1543 09/02/21  0430   WBC 28.2* 25.3* 19.0*   HGB 11.2* 11.4* 11.2*   HCT 35.2* 35.7* 35.8*   * 118* 107*      Lab Results   Component Value Date     09/02/2021    K 4.3 09/02/2021    K 4.0 06/16/2021    CL 97 09/02/2021    CO2 25 09/02/2021    BUN 28 09/02/2021    CREATININE 1.0 09/02/2021    GLUCOSE 268 09/02/2021    GLUCOSE 259 04/13/2012    CALCIUM 8.1 09/02/2021         Physical Exam:    BP (!) 120/91   Pulse 84   Temp 98.2 °F (36.8 °C) (Axillary)   Resp 20   Ht 6' 3\" (1.905 m)   Wt 295 lb (133.8 kg)   SpO2 99%   BMI 36.87 kg/m²       CXR Findings: 9/2/2021        Impression:     1. Cardiomegaly.  There are no findings of pneumonia or failure. 2. Stable position of the chest tubes.  There is no right or left   pneumothorax. --- CXR personally viewed and interpreted by ICU Nurse Practitioner, agree with above findings     General: Awake, alert. Denies SOB, feeling better today   Eyes: PERRL, anicteric   Pulmonary: Diminished bibasilar.  No wheezes, no accessory muscle use noted down to 4L NC  Cardiovascular:  RRR, no heaves or thrills on palpation  Tele: SR  Abdomen: Soft, nontender, +BS Extremities: Palpable pulses all extremities, trace edema   Neurologic/Psych: A&Ox3, LYMAN to command   Skin: Warm and dry  Incisions: MSI with celina dressing intact, LLE SVG sites MAURILIO well approximated       Assessment/Plan: POD #3  1. VHD, CAD S/p AVR, MVr, CABGx2, LAAC  - ASA (continue to hold of plavix d/t thrombocytopenia)   - Lipitor   - start BB once off inotropic support   - Pl drainsx2 changed to bulb sxn   - IV Amio changed to po for afib prophylaxis   - Daily lovenox for VTE prophylaxis  - Picc line ordered   - PT/OT     2. Acute Pulmonary Insufficiency Following Surgery    - Expected 2/2 surgery  - Extubated POD1, required 8L HFNC/ intermittent bipap   - Oxygenation improving, CXR with improvement, pulling 1500 on SMI, down to 4L NC with sats 97%. Empiric zosyn. Dose lasix prn. Encourage IS, Ezpap per RT, ambulate      3. Acute on chronic systolic heart failure  - ICM; EF ~20%  -DOS 0.09units/min vasopressin, 16mcq/min epinephrine and 5 mcq/kg/min dobutamine  -POD1- hemodynamics much improved, lactic downtrending, creatinine and lfts NML-- down to 0.05 units vasopressin, 10mcq/min epinephrine, dobutamine up to 7.5mcq. CI 2.2, CO 5.6, SVR 1278, mixed venous 83  -POD2-shock resolved, off vasopressin, epinephrine, remains on dobutamine  -POD3-Dobutamine dropped to 2mcq, plan to slowly wean off then introduce heart failure meds      4. Acute Post Operative Pain   - incisional pain. Prn oxycodone     5.  Uncontrolled DMII  - Hemoglobin A1c 8.0  - SSI/nightly lantus increased       VTE Prophylaxis: Pharmacologic/Mechanical:  Yes, SCDs/daily lovenox   Line infection prevention: Can CVC or arterial line be removed: Yes  Continued need for urinary catheter: No    Dispo: transfer to [employer]     Electronically signed by SHIRA Wood CNP on 9/2/2021 at 7:55 AM

## 2021-09-02 NOTE — PLAN OF CARE
Problem:  Activity Intolerance:  Goal: Able to perform prescribed physical activity  Description: Able to perform prescribed physical activity  Outcome: Not Met This Shift     Problem: Anxiety:  Goal: Level of anxiety will decrease  Description: Level of anxiety will decrease  Outcome: Met This Shift     Problem: Fluid Volume - Imbalance:  Goal: Ability to achieve a balanced intake and output will improve  Description: Ability to achieve a balanced intake and output will improve  Outcome: Met This Shift     Problem: Gas Exchange - Impaired:  Goal: Levels of oxygenation will improve  Description: Levels of oxygenation will improve  Outcome: Met This Shift

## 2021-09-02 NOTE — PLAN OF CARE
Problem: Discharge Planning:  Goal: Discharged to appropriate level of care  Description: Discharged to appropriate level of care  Outcome: Not Met This Shift     Problem:  Activity Intolerance:  Goal: Able to perform prescribed physical activity  Description: Able to perform prescribed physical activity  7/9/2371 5470 by Giovanni Aponte RN  Outcome: Ongoing  9/2/2021 0235 by Mario Apley, RN  Outcome: Not Met This Shift  Goal: Ability to tolerate increased activity will improve  Description: Ability to tolerate increased activity will improve  Outcome: Met This Shift     Problem: Anxiety:  Goal: Level of anxiety will decrease  Description: Level of anxiety will decrease  7/2/8190 3773 by Giovanni Aponte RN  Outcome: Met This Shift  9/2/2021 0235 by Mario Apley, RN  Outcome: Met This Shift     Problem: Cardiac Output - Decreased:  Goal: Cardiac output within specified parameters  Description: Cardiac output within specified parameters  Outcome: Met This Shift  Goal: Hemodynamic stability will improve  Description: Hemodynamic stability will improve  Outcome: Met This Shift     Problem: Fluid Volume - Imbalance:  Goal: Ability to achieve a balanced intake and output will improve  Description: Ability to achieve a balanced intake and output will improve  4/3/0046 2337 by Giovanni Aponte RN  Outcome: Met This Shift  9/2/2021 0235 by Mario Apley, RN  Outcome: Met This Shift  Goal: Chest tube drainage is within specified parameters  Description: Chest tube drainage is within specified parameters  Outcome: Met This Shift     Problem: Gas Exchange - Impaired:  Goal: Levels of oxygenation will improve  Description: Levels of oxygenation will improve  0/6/1377 5448 by Giovanni Aponte RN  Outcome: Met This Shift  9/2/2021 0235 by Mario Apley, RN  Outcome: Met This Shift  Goal: Ability to maintain adequate ventilation will improve  Description: Ability to maintain adequate ventilation will improve  Outcome: Met This Shift     Problem: Pain:  Goal: Pain level will decrease  Description: Pain level will decrease  Outcome: Met This Shift  Goal: Control of acute pain  Description: Control of acute pain  Outcome: Met This Shift  Goal: Control of chronic pain  Description: Control of chronic pain  Outcome: Met This Shift     Problem: Pain:  Goal: Pain level will decrease  Description: Pain level will decrease  Outcome: Met This Shift

## 2021-09-02 NOTE — PROGRESS NOTES
Inpatient Cardiology Progress note     PATIENT IS BEING FOLLOWED FOR: AS/CAD/ICM/CHF    Date of Service: 9/2/2021    Arsen Cyr is a 63 y.o. male who is known to Dr. Flo Moritz (during initial consultation 6/17/2021) for acute HFrEF, CMP and low flow low gradient severe AS. Patient underwent a cath 6/2021 showing Severe LAD and IR disease, Severe AS. S/p Sternotomy/AVR with 27mm Mane Inspiris bioprosthetic/Mitral valve repair by decalcification of the anterior leaflet of the mitral valve/Reduction aortoplasty/CABG x 2 (LIMA-LAD, SVG-Diag 1)/DERRICK exclusion with 35 mm AtriClip/Rigid internal fixation of the sternum with KLS plates x 22 modifier on 8/30/2021. History of Present Illness:  S/p CT surgery on 8/30/21. No chest pain, respiratory distress, or palpitations. SR with PAC's on telemetry. Weaning dobutamine.     Review of Systems:   Cardiac: As per HPI  General: No fever, chills  Pulmonary: As per HPI  HEENT: No visual disturbances, difficult swallowing  GI: No nausea, vomiting  : No dysuria, hematuria  Endocrine: No thyroid disease, +DM  Musculoskeletal: LYMAN x 4, no focal motor deficits  Skin: Intact, no rashes  Neuro: No headache, seizures  Psych: Currently with no depression, anxiety    PHYSICAL EXAM:   /80   Pulse 87   Temp 98.2 °F (36.8 °C) (Axillary)   Resp 22   Ht 6' 3\" (1.905 m)   Wt 295 lb (133.8 kg)   SpO2 97%   BMI 36.87 kg/m²    B/P Range last 24 hours: Systolic (09HIF), OCD:401 , Min:108 , TWI:885    Diastolic (25NUV), AGL:70, Min:60, Max:120  Appearance: Awake, alert, no acute distress  Skin: Intact, no rash  Head: Normocephalic, atraumatic  Eyes: EOMI, no conjunctival erythema  ENMT: MMM, no rhinorrhea  Neck: Supple, no carotid bruits  Lungs: Decreased BS B/L, no wheezing  Cardiac: Regular rate and rhythm, +S1S2, no murmurs  Abdomen: Soft, +bowel sounds  Extremities: Moves all extremities x 4, no lower extremity edema  Neurologic: Alert, no focal motor deficits apparent      Intake/Output Summary (Last 24 hours) at 9/2/2021 0646  Last data filed at 9/2/2021 0600  Gross per 24 hour   Intake 970.91 ml   Output 2210 ml   Net -1239.09 ml       Weight:   Wt Readings from Last 3 Encounters:   09/02/21 295 lb (133.8 kg)   08/26/21 283 lb 3 oz (128.5 kg)   08/10/21 282 lb (127.9 kg)     Current Inpatient Medications:   lidocaine PF  5 mL IntraDERmal Once    sodium chloride flush  5-40 mL IntraVENous 2 times per day    heparin flush  3 mL IntraVENous 2 times per day    piperacillin-tazobactam  3,375 mg IntraVENous Q8H    amiodarone  200 mg Oral TID    enoxaparin  40 mg SubCUTAneous Daily    insulin lispro  0-18 Units SubCUTAneous TID WC    insulin lispro  0-9 Units SubCUTAneous Nightly    hydrocortisone sodium succinate PF  50 mg IntraVENous Q8H    insulin glargine  23 Units SubCUTAneous Nightly    atorvastatin  40 mg Oral Nightly    sodium chloride flush  10 mL IntraVENous 2 times per day    aspirin  81 mg Oral Daily    magnesium oxide  400 mg Oral Daily    mupirocin   Nasal BID    sennosides-docusate sodium  1 tablet Oral BID    pantoprazole  40 mg Oral Daily    ipratropium-albuterol  1 ampule Inhalation Q4H WA    tamsulosin  0.4 mg Oral Daily       IV Infusions (if any):   sodium chloride      sodium chloride 30 mL/hr (08/31/21 0600)    sodium chloride      sodium chloride      dextrose      DOBUTamine 3 mcg/kg/min (09/02/21 0433)       DIAGNOSTIC/ LABORATORY DATA:  Labs:   CBC:   Recent Labs     09/01/21  1543 09/02/21  0430   WBC 25.3* 19.0*   HGB 11.4* 11.2*   HCT 35.7* 35.8*   * 107*     BMP:   Recent Labs     09/01/21 2002 09/02/21  0430    132   K 5.1* 4.3   CO2 26 25   BUN 29* 28*   CREATININE 1.4* 1.0   LABGLOM 51 >60   CALCIUM 8.3* 8.1*     Mag:   Recent Labs     09/01/21  0400 09/02/21  0430   MG 2.1 2.5     Phos: No results for input(s): PHOS in the last 72 hours.   TFT: No results found for: TSH, J7IOOKZ, W5MGRNR, THYROIDAB, FT3, T4FREE   HgA1c:   Lab Results   Component Value Date    LABA1C 8.0 (H) 08/26/2021     No results found for: EAG    BNP: No results for input(s): BNP in the last 72 hours. PT/INR:   Recent Labs     08/30/21  1204   PROTIME 19.2*   INR 1.8     APTT:  Recent Labs     08/30/21  1204   APTT 29.6     CARDIAC ENZYMES:No results for input(s): CKTOTAL, CKMB, CKMBINDEX, TROPHS in the last 72 hours. FASTING LIPID PANEL:  Lab Results   Component Value Date    CHOL 124 06/18/2021    HDL 33 06/22/2021    LDLCALC 87 06/22/2021    TRIG 70 06/18/2021     Lab Results   Component Value Date    LACTA 1.1 09/01/2021     CXR: 8/30/2021   Stable postoperative chest with tubes and catheters as noted.       Mild CHF. Telemetry reviewed (date: 9/2/2021): SR, rate 80's, PAC's    Echocardiogram: 6/15/21   Left ventricle is moderately enlarged . Ejection fraction is visually estimated at 15-20%. Overall ejection fraction severely decreased . Severe global hypokinesis. Normal left ventricle wall thickness. Stage III diastolic dysfunction. Moderately dilated right ventricle. Right ventricle global systolic function is low normal . TAPSE 17 mm. There is severe low flow, low gradient aortic stenosis with valve area of   0.8 sq cm by continuity equation. Aortic valve peak velocity 2.8 m/s, mean   gradient 19 mmHg, DVI 0.22, SVI 15 ml/m2. Mild tricuspid regurgitation. RVSP is 58 mmHg. Pulmonary hypertension is moderate . No evidence for hemodynamically significant pericardial effusion. GABRIELE: 7/12/2021 Ejection fraction is visually estimated at 20-25%. The aortic valve is bicuspid, with fusion of the right and left coronary cusps. Probably severe low-flow low-gradient aortic stenosis is present. Mean gradient 30 mmHg. YESI 0.8 cm2. YESI by planimetry 1.2 cm2. Ascending aorta and root are dilated.     Cardiac catheterization: 6/21/2021 (Dr. Harmony Campbell)  Findings:  Left main: 0%  stenosis  LAD: 80-90 %  Stenosis  IR: large with 95%

## 2021-09-02 NOTE — PROGRESS NOTES
Date: 9/1/2021    Time: 10:36 PM    Patient Placed On BIPAP/CPAP/ Non-Invasive Ventilation? Yes    If no must comment. Facial area red/color change? No           If YES are Blister/Lesion present? No   If yes must notify nursing staff  BIPAP/CPAP skin barrier?   Yes    Skin barrier type:mepilexlite       Comments:        Adam Elias RCP

## 2021-09-02 NOTE — CARE COORDINATION
SOCIAL WORK/CASEMANAGEMENT TRANSITION OF CARE [id number] Kyra Car, [address], [place]ana Sánchez, [phone]): met with pt in the unit. Went over need for 24/7 care with [facility]hc if able to go home and ambulating 400'. Pt said he has no one to stay with him. Provided pt with [facility] and [facility] and he will pick from them and sw to follow up tomorrow.  BILLY Samaniego  9/2/2021

## 2021-09-03 ENCOUNTER — APPOINTMENT (OUTPATIENT)
Dept: GENERAL RADIOLOGY | Age: 61
DRG: 163 | End: 2021-09-03
Attending: THORACIC SURGERY (CARDIOTHORACIC VASCULAR SURGERY)
Payer: COMMERCIAL

## 2021-09-03 LAB
ALBUMIN SERPL-MCNC: 3.2 G/DL (ref 3.5–5.2)
ALP BLD-CCNC: 81 U/L (ref 40–129)
ALT SERPL-CCNC: 12 U/L (ref 0–40)
ANION GAP SERPL CALCULATED.3IONS-SCNC: 11 MMOL/L (ref 7–16)
AST SERPL-CCNC: 23 U/L (ref 0–39)
BILIRUB SERPL-MCNC: 0.7 MG/DL (ref 0–1.2)
BUN BLDV-MCNC: 28 MG/DL (ref 6–23)
CALCIUM SERPL-MCNC: 8.4 MG/DL (ref 8.6–10.2)
CHLORIDE BLD-SCNC: 99 MMOL/L (ref 98–107)
CO2: 24 MMOL/L (ref 22–29)
CREAT SERPL-MCNC: 1.1 MG/DL (ref 0.7–1.2)
EKG ATRIAL RATE: 98 BPM
EKG Q-T INTERVAL: 422 MS
EKG QRS DURATION: 130 MS
EKG QTC CALCULATION (BAZETT): 521 MS
EKG R AXIS: -54 DEGREES
EKG T AXIS: 89 DEGREES
EKG VENTRICULAR RATE: 92 BPM
GFR AFRICAN AMERICAN: >60
GFR NON-AFRICAN AMERICAN: >60 ML/MIN/1.73
GLUCOSE BLD-MCNC: 177 MG/DL (ref 74–99)
HCT VFR BLD CALC: 36.3 % (ref 37–54)
HEMOGLOBIN: 11.6 G/DL (ref 12.5–16.5)
MAGNESIUM: 2.3 MG/DL (ref 1.6–2.6)
MCH RBC QN AUTO: 30.3 PG (ref 26–35)
MCHC RBC AUTO-ENTMCNC: 32 % (ref 32–34.5)
MCV RBC AUTO: 94.8 FL (ref 80–99.9)
METER GLUCOSE: 157 MG/DL (ref 74–99)
METER GLUCOSE: 173 MG/DL (ref 74–99)
METER GLUCOSE: 207 MG/DL (ref 74–99)
METER GLUCOSE: 227 MG/DL (ref 74–99)
PDW BLD-RTO: 16 FL (ref 11.5–15)
PLATELET # BLD: 129 E9/L (ref 130–450)
PMV BLD AUTO: 11.4 FL (ref 7–12)
POTASSIUM SERPL-SCNC: 4.8 MMOL/L (ref 3.5–5)
RBC # BLD: 3.83 E12/L (ref 3.8–5.8)
SODIUM BLD-SCNC: 134 MMOL/L (ref 132–146)
TOTAL PROTEIN: 6.1 G/DL (ref 6.4–8.3)
WBC # BLD: 15.5 E9/L (ref 4.5–11.5)

## 2021-09-03 PROCEDURE — 99233 SBSQ HOSP IP/OBS HIGH 50: CPT | Performed by: INTERNAL MEDICINE

## 2021-09-03 PROCEDURE — 93005 ELECTROCARDIOGRAM TRACING: CPT | Performed by: THORACIC SURGERY (CARDIOTHORACIC VASCULAR SURGERY)

## 2021-09-03 PROCEDURE — 83735 ASSAY OF MAGNESIUM: CPT

## 2021-09-03 PROCEDURE — 6360000002 HC RX W HCPCS: Performed by: NURSE PRACTITIONER

## 2021-09-03 PROCEDURE — 93798 PHYS/QHP OP CAR RHAB W/ECG: CPT

## 2021-09-03 PROCEDURE — 6360000002 HC RX W HCPCS: Performed by: THORACIC SURGERY (CARDIOTHORACIC VASCULAR SURGERY)

## 2021-09-03 PROCEDURE — 80053 COMPREHEN METABOLIC PANEL: CPT

## 2021-09-03 PROCEDURE — 71045 X-RAY EXAM CHEST 1 VIEW: CPT

## 2021-09-03 PROCEDURE — 36415 COLL VENOUS BLD VENIPUNCTURE: CPT

## 2021-09-03 PROCEDURE — 85027 COMPLETE CBC AUTOMATED: CPT

## 2021-09-03 PROCEDURE — 2580000003 HC RX 258: Performed by: NURSE PRACTITIONER

## 2021-09-03 PROCEDURE — 2700000000 HC OXYGEN THERAPY PER DAY

## 2021-09-03 PROCEDURE — 94640 AIRWAY INHALATION TREATMENT: CPT

## 2021-09-03 PROCEDURE — 6370000000 HC RX 637 (ALT 250 FOR IP): Performed by: NURSE PRACTITIONER

## 2021-09-03 PROCEDURE — 2140000000 HC CCU INTERMEDIATE R&B

## 2021-09-03 PROCEDURE — 82962 GLUCOSE BLOOD TEST: CPT

## 2021-09-03 RX ORDER — MAGNESIUM SULFATE IN WATER 40 MG/ML
2000 INJECTION, SOLUTION INTRAVENOUS ONCE
Status: COMPLETED | OUTPATIENT
Start: 2021-09-03 | End: 2021-09-03

## 2021-09-03 RX ADMIN — HYDROCORTISONE SODIUM SUCCINATE 50 MG: 100 INJECTION, POWDER, FOR SOLUTION INTRAMUSCULAR; INTRAVENOUS at 21:19

## 2021-09-03 RX ADMIN — INSULIN GLARGINE 28 UNITS: 100 INJECTION, SOLUTION SUBCUTANEOUS at 21:25

## 2021-09-03 RX ADMIN — SODIUM CHLORIDE, PRESERVATIVE FREE 10 ML: 5 INJECTION INTRAVENOUS at 06:37

## 2021-09-03 RX ADMIN — DOCUSATE SODIUM 50 MG AND SENNOSIDES 8.6 MG 1 TABLET: 8.6; 5 TABLET, FILM COATED ORAL at 21:18

## 2021-09-03 RX ADMIN — OXYCODONE HYDROCHLORIDE AND ACETAMINOPHEN 500 MG: 500 TABLET ORAL at 21:18

## 2021-09-03 RX ADMIN — ASPIRIN 81 MG: 81 TABLET ORAL at 08:56

## 2021-09-03 RX ADMIN — AMIODARONE HYDROCHLORIDE 200 MG: 200 TABLET ORAL at 13:49

## 2021-09-03 RX ADMIN — FERROUS SULFATE TAB 325 MG (65 MG ELEMENTAL FE) 325 MG: 325 (65 FE) TAB at 08:56

## 2021-09-03 RX ADMIN — TAMSULOSIN HYDROCHLORIDE 0.4 MG: 0.4 CAPSULE ORAL at 08:55

## 2021-09-03 RX ADMIN — IPRATROPIUM BROMIDE AND ALBUTEROL SULFATE 1 AMPULE: .5; 2.5 SOLUTION RESPIRATORY (INHALATION) at 16:49

## 2021-09-03 RX ADMIN — IPRATROPIUM BROMIDE AND ALBUTEROL SULFATE 1 AMPULE: .5; 2.5 SOLUTION RESPIRATORY (INHALATION) at 13:23

## 2021-09-03 RX ADMIN — SODIUM CHLORIDE, PRESERVATIVE FREE 10 ML: 5 INJECTION INTRAVENOUS at 08:58

## 2021-09-03 RX ADMIN — PANTOPRAZOLE SODIUM 40 MG: 40 TABLET, DELAYED RELEASE ORAL at 08:55

## 2021-09-03 RX ADMIN — BISACODYL 5 MG: 5 TABLET, COATED ORAL at 08:56

## 2021-09-03 RX ADMIN — INSULIN LISPRO 3 UNITS: 100 INJECTION, SOLUTION INTRAVENOUS; SUBCUTANEOUS at 08:55

## 2021-09-03 RX ADMIN — Medication 400 MG: at 08:55

## 2021-09-03 RX ADMIN — INSULIN LISPRO 3 UNITS: 100 INJECTION, SOLUTION INTRAVENOUS; SUBCUTANEOUS at 21:25

## 2021-09-03 RX ADMIN — FOLIC ACID 1 MG: 1 TABLET ORAL at 08:55

## 2021-09-03 RX ADMIN — FERROUS SULFATE TAB 325 MG (65 MG ELEMENTAL FE) 325 MG: 325 (65 FE) TAB at 17:00

## 2021-09-03 RX ADMIN — HYDROCORTISONE SODIUM SUCCINATE 50 MG: 100 INJECTION, POWDER, FOR SOLUTION INTRAMUSCULAR; INTRAVENOUS at 06:36

## 2021-09-03 RX ADMIN — Medication 10 ML: at 21:19

## 2021-09-03 RX ADMIN — OXYCODONE HYDROCHLORIDE AND ACETAMINOPHEN 2 TABLET: 5; 325 TABLET ORAL at 08:56

## 2021-09-03 RX ADMIN — MAGNESIUM HYDROXIDE 30 ML: 400 SUSPENSION ORAL at 08:55

## 2021-09-03 RX ADMIN — ENOXAPARIN SODIUM 40 MG: 40 INJECTION SUBCUTANEOUS at 08:59

## 2021-09-03 RX ADMIN — MUPIROCIN: 20 OINTMENT TOPICAL at 21:24

## 2021-09-03 RX ADMIN — IPRATROPIUM BROMIDE AND ALBUTEROL SULFATE 1 AMPULE: .5; 2.5 SOLUTION RESPIRATORY (INHALATION) at 10:10

## 2021-09-03 RX ADMIN — OXYCODONE HYDROCHLORIDE AND ACETAMINOPHEN 2 TABLET: 5; 325 TABLET ORAL at 04:02

## 2021-09-03 RX ADMIN — ATORVASTATIN CALCIUM 40 MG: 80 TABLET, FILM COATED ORAL at 21:18

## 2021-09-03 RX ADMIN — OXYCODONE HYDROCHLORIDE AND ACETAMINOPHEN 1 TABLET: 5; 325 TABLET ORAL at 13:53

## 2021-09-03 RX ADMIN — MAGNESIUM SULFATE HEPTAHYDRATE 2000 MG: 40 INJECTION, SOLUTION INTRAVENOUS at 01:47

## 2021-09-03 RX ADMIN — INSULIN LISPRO 6 UNITS: 100 INJECTION, SOLUTION INTRAVENOUS; SUBCUTANEOUS at 17:01

## 2021-09-03 RX ADMIN — AMIODARONE HYDROCHLORIDE 200 MG: 200 TABLET ORAL at 21:18

## 2021-09-03 RX ADMIN — AMIODARONE HYDROCHLORIDE 200 MG: 200 TABLET ORAL at 08:55

## 2021-09-03 RX ADMIN — MUPIROCIN: 20 OINTMENT TOPICAL at 08:55

## 2021-09-03 RX ADMIN — INSULIN LISPRO 6 UNITS: 100 INJECTION, SOLUTION INTRAVENOUS; SUBCUTANEOUS at 11:14

## 2021-09-03 RX ADMIN — PIPERACILLIN AND TAZOBACTAM 3375 MG: 3; .375 INJECTION, POWDER, LYOPHILIZED, FOR SOLUTION INTRAVENOUS at 17:01

## 2021-09-03 RX ADMIN — SODIUM CHLORIDE, PRESERVATIVE FREE 10 ML: 5 INJECTION INTRAVENOUS at 21:18

## 2021-09-03 RX ADMIN — PIPERACILLIN AND TAZOBACTAM 3375 MG: 3; .375 INJECTION, POWDER, LYOPHILIZED, FOR SOLUTION INTRAVENOUS at 00:59

## 2021-09-03 RX ADMIN — IPRATROPIUM BROMIDE AND ALBUTEROL SULFATE 1 AMPULE: .5; 2.5 SOLUTION RESPIRATORY (INHALATION) at 21:03

## 2021-09-03 RX ADMIN — DOCUSATE SODIUM 50 MG AND SENNOSIDES 8.6 MG 1 TABLET: 8.6; 5 TABLET, FILM COATED ORAL at 08:56

## 2021-09-03 RX ADMIN — HEPARIN 300 UNITS: 100 SYRINGE at 21:18

## 2021-09-03 RX ADMIN — OXYCODONE HYDROCHLORIDE AND ACETAMINOPHEN 500 MG: 500 TABLET ORAL at 08:55

## 2021-09-03 RX ADMIN — PIPERACILLIN AND TAZOBACTAM 3375 MG: 3; .375 INJECTION, POWDER, LYOPHILIZED, FOR SOLUTION INTRAVENOUS at 08:59

## 2021-09-03 ASSESSMENT — PAIN SCALES - GENERAL
PAINLEVEL_OUTOF10: 0
PAINLEVEL_OUTOF10: 7
PAINLEVEL_OUTOF10: 6
PAINLEVEL_OUTOF10: 0
PAINLEVEL_OUTOF10: 8
PAINLEVEL_OUTOF10: 0
PAINLEVEL_OUTOF10: 0

## 2021-09-03 NOTE — PROGRESS NOTES
Nutrition Education      Counseled patient on heart healthy/diabetic diet. Provided educational handouts and sample meal plans. Pt states that he a  and eats on the road often. · Written educational materials provided. · Contact name and number provided. · Refer to Patient Education activity for more details.     Electronically signed by Tammie Leigh RD, LD on 9/3/21 at 11:37 AM EDT    Contact: [phone]

## 2021-09-03 NOTE — PROGRESS NOTES
Occupational Therapy     Date:9/3/2021  Patient Name: Elroy Shabazz  MRN: [id number]  : 1960  Room: [address] [place] [address]     Completed chart review & attempted to see pt with pt politely asking for therapist to come back later to allow him to take his pain medication & eat breakfast, also x-ray technician arrived. Will attempt to see pt at another time. Anastasiya Gaspar.  [address] [place] [address], [address] [place] [address]

## 2021-09-03 NOTE — PROGRESS NOTES
Patient went into afib. EKG obtained, vital signs and lab work were drawn and mag sulfate given per orders. Paving wires connected to temporary pacer box.

## 2021-09-03 NOTE — PROGRESS NOTES
Messaged Renaldo Lawson NP about new afib. Aware of interventions that have been done. Clarified amiodarone orders.

## 2021-09-03 NOTE — DISCHARGE INSTR - COC
Continuity of Care Form    Patient Name: Kelly Huerta   :  1960  MRN:  [id number]    Admit date:  2021  Discharge date:  2021    Code Status Order: Full Code   Advance Directives:   Advance Care Flowsheet Documentation       Date/Time Healthcare Directive Type of Healthcare Directive Copy in [address] Agent's Name Healthcare Agent's Phone Number    21 0533  No, patient does not have an advance directive for healthcare treatment  --  --  --  --  --    21 1001  No, patient does not have an advance directive for healthcare treatment  --  --  --  --  --    21 0926  No, patient does not have an advance directive for healthcare treatment  --  --  --  --  --            Admitting Physician:  Surya Dinh MD  PCP: Nicanor William DO    Discharging Nurse: [address]/Room#: 8881/0223-C  Discharging Unit Phone Number: [phone]    Emergency Contact:   Extended Emergency Contact Information  Primary Emergency Contact: Jhony Corey  Address: [address] [facility], [address] [place] [address] Phone: [phone]  Relation: Child  Secondary Emergency Contact: raciel cabello  Mobile Phone: [phone]  Relation: Child    Past Surgical History:  Past Surgical History:   Procedure Laterality Date    AORTIC MITRAL VALVE REPLACEMENT N/A 2021    AORTIC VAVLE REPLACEMENT, CORONARY ARTERY BYPASS, ATRIAL APPENDAGE CLIP, GABRIELE performed by Surya Dinh MD at [address] [facility]     [address] [facility]      at age of 2    TRANSESOPHAGEAL ECHOCARDIOGRAM  2021       Immunization History: There is no immunization history on file for this patient.     Active Problems:  Patient Active Problem List   Diagnosis Code    Acute systolic congestive heart failure (HCC) I50.21    Class 2 severe obesity due to excess calories with serious comorbidity in adult ([facility] [address] E66.01    Uncontrolled type 2 diabetes mellitus with hyperglycemia ([facility]) E11.65    Essential hypertension I10    Severe aortic stenosis K61.7    Acute systolic CHF (congestive heart failure) ([facility]) I50.21    Heart failure ([facility]) I50.9    CAD in native artery I25.10    COVID-19 U07.1    Acute pulmonary insufficiency J98.4       Isolation/Infection:   Isolation            No Isolation          Patient Infection Status       Infection Onset Added Last Indicated Last Indicated By Review Planned Expiration Resolved Resolved By    None active    Resolved    COVID-19 Rule Out 08/25/21 08/25/21 08/25/21 Covid-19 Ambulatory (Ordered)   08/27/21 Rule-Out Test Resulted    COVID-19 Rule Out 08/09/21 08/09/21 08/09/21 Covid-19 Ambulatory (Ordered)   08/10/21 Rule-Out Test Resulted    COVID-19 Rule Out 07/07/21 07/07/21 07/07/21 COVID-19 Ambulatory (Ordered)   07/09/21 Rule-Out Test Resulted            Nurse Assessment:  Last Vital Signs: BP (!) 134/93   Pulse 80   Temp 98.2 °F (36.8 °C) (Temporal)   Resp 16   Ht 6' 3\" (1.905 m)   Wt 293 lb 3.2 oz (133 kg)   SpO2 98%   BMI 36.65 kg/m²     Last documented pain score (0-10 scale): Pain Level: 8  Last Weight:   Wt Readings from Last 1 Encounters:   09/03/21 293 lb 3.2 oz (133 kg)     Mental Status:  oriented, alert, coherent, logical, thought processes intact and able to concentrate and follow conversation    IV Access:  - None    Nursing Mobility/ADLs:  Walking   Independent  Transfer  Independent  Bathing  Assisted  Dressing  Assisted  Toileting  Independent  Feeding  Independent  Med [address]  Med Delivery   whole    Wound Care Documentation and Therapy:  Puncture 08/30/21 Groin Anterior; Left (Active)   Wound Assessment Clean;Dry; Intact 08/30/21 1042   Dressing/Treatment Dry dressing 08/30/21 1042   Dressing Status Dry;Clean; Intact 08/30/21 1042   Number of days: 4        Elimination:  Continence:   · Bowel:  Yes  · Bladder: Yes  Urinary Catheter: None   Colostomy/Ileostomy/Ileal Conduit: No       Date of Last BM: 9/7/2021    Intake/Output Summary (Last 24 hours) at 9/3/2021 0947  Last data filed at 9/3/2021 0855  Gross per 24 hour   Intake 2339.53 ml   Output 1715 ml   Net 624.53 ml     I/O last 3 completed shifts: In: 2579.5 [P.O.:1800; I.V.:329.5; IV Piggyback:450]  Out: 2046 [Urine:1615; Chest Tube:165]    Safety Concerns: At Risk for Falls and Sternal Precautions    Impairments/Disabilities:      None    Nutrition Therapy:  Current Nutrition Therapy:   - Oral Diet:  Carb Control 4 carbs/meal (1800kcals/day)    Routes of Feeding: Oral  Liquids: Thin Liquids  Daily Fluid Restriction: yes - amount 1000  Last Modified Barium Swallow with Video (Video Swallowing Test): not done    Treatments at the Time of Hospital Discharge:   Respiratory Treatments: Duoneb Q4 while awake  Oxygen Therapy:  is not on home oxygen therapy. Ventilator:    - No ventilator support    Rehab Therapies: PT and OT to eval and treat   Weight Bearing Status/Restrictions: No weight bearing restirctions  Other Medical Equipment (for information only, NOT a DME order):  hospital bed  Other Treatments: ***please follow the cardiac rehab protocol enclosed!!!    Patient's personal belongings (please select all that are sent with patient):  clothes, cell phone,     RN SIGNATURE:  Electronically signed by Chencho Terry RN on 9/7/21 at 2:50 PM EDT    CASE MANAGEMENT/SOCIAL WORK SECTION    Inpatient Status Date: ***    Readmission Risk Assessment Score:  Readmission Risk              Risk of Unplanned Readmission:  26           Discharging to Facility/ Agency   · [facility]: [facility]    · Address: [address]   · Phone:  · Fax:    Dialysis Facility (if applicable)   · Name:  · Address:  · Dialysis Schedule:  · Phone:  · Fax:    / signature:Electronically signed by BILLY Shepherd on 9/3/2021 at 9:47 AM      PHYSICIAN SECTION    Prognosis: Good    Condition at Discharge: Stable    Rehab Potential (if transferring to Rehab): Good    Recommended Labs or Other Treatments After Discharge: none    Physician Certification: I certify the above information and transfer of Corey Gonzales  is necessary for the continuing treatment of the diagnosis listed and that he requires [facility] for less 30 days.      Update Admission H&P: see last progress note     PHYSICIAN SIGNATURE:  Electronically signed by JASMINE Johnson on 9/7/21 at 8:14 AM EDT

## 2021-09-03 NOTE — PROGRESS NOTES
POD# 4  Awake, alert. No complaints. Denies CP, palpitations, SOB at rest, dizziness/lightheadedness. Vitals:    09/03/21 0420 09/03/21 0530 09/03/21 0538 09/03/21 0739   BP: (!) 176/116 (!) 138/90  (!) 134/93   Pulse: 83   80   Resp:    16   Temp:    98.2 °F (36.8 °C)   TempSrc:    Temporal   SpO2:    98%   Weight:   293 lb 3.2 oz (133 kg)    Height:         O2: 2L/NC      Intake/Output Summary (Last 24 hours) at 9/3/2021 0919  Last data filed at 9/3/2021 0655  Gross per 24 hour   Intake 2339.53 ml   Output 1680 ml   Net 659.53 ml       UO: 875 mL/8hr       Recent Labs     09/02/21  0430 09/02/21  1715 09/03/21  0140   WBC 19.0* 19.7* 15.5*   HGB 11.2* 11.8* 11.6*   HCT 35.8* 37.2 36.3*   * 118* 129*      Recent Labs     09/02/21  0430 09/02/21  1715 09/03/21  0140   BUN 28* 30* 28*   CREATININE 1.0 1.2 1.1           Telemetry: Afib       PE  Cardiac: IRRR  Lungs: decreased bases  Chest incision with intact JONE DSD. Prior chest tube site incisions C/D/I, no erythema with intact sutures. Chest tubes x 2 and Epicardial pacing wires present and secure. Abd: Soft, nontender, +BS  Ext: Incisions C/D/I, approximated, no erythema, + edema           A/P: POD# 4    1. CAD, AS, ischemic cardiomyopathy   --Stable s/p AVR, MVrp, CABG, DERRICK exclusion   --Post op GABRIELE reveals 20%  EF- on inotropes    --will order TTE prior to discharge, once off Dobut to establish need for Life vest   --Scr stable  -- ASA/ statin - plan to start BB once off inotropic support   --reinforce sternal precautions  --continue epicardial pacing wires  --chest tubes without significant output and without airleaks. Remove today. Chest tube(s) removed without difficulty. Patient tolerated well  - Solu-Cortef- will transition to PO [facility]      2. Expected acute blood loss anemia secondary to open heart surgery  --stable  -- hgb 11.6       3. Afib   --Cont PO amio  -- Plan to add BB once off inotropic support  -- Eliquis prior to DC ( DERRICK

## 2021-09-03 NOTE — PROGRESS NOTES
deficits apparent      Intake/Output Summary (Last 24 hours) at 9/3/2021 1528  Last data filed at 9/3/2021 1442  Gross per 24 hour   Intake 1856.53 ml   Output 1340 ml   Net 516.53 ml       Weight:   Wt Readings from Last 3 Encounters:   09/03/21 293 lb 3.2 oz (133 kg)   08/26/21 283 lb 3 oz (128.5 kg)   08/10/21 282 lb (127.9 kg)     Current Inpatient Medications:   hydrocortisone sodium succinate PF  50 mg IntraVENous Q12H    insulin glargine  28 Units SubCUTAneous Nightly    aspirin  81 mg Oral Daily    bisacodyl  5 mg Oral Daily    sennosides-docusate sodium  1 tablet Oral BID    magnesium hydroxide  30 mL Oral Daily    ferrous sulfate  325 mg Oral BID WC    vitamin C  500 mg Oral BID    folic acid  1 mg Oral Daily    pantoprazole  40 mg Oral Daily    sodium chloride flush  5-40 mL IntraVENous 2 times per day    heparin flush  3 mL IntraVENous 2 times per day    piperacillin-tazobactam  3,375 mg IntraVENous Q8H    amiodarone  200 mg Oral TID    enoxaparin  40 mg SubCUTAneous Daily    insulin lispro  0-18 Units SubCUTAneous TID WC    insulin lispro  0-9 Units SubCUTAneous Nightly    atorvastatin  40 mg Oral Nightly    sodium chloride flush  10 mL IntraVENous 2 times per day    magnesium oxide  400 mg Oral Daily    mupirocin   Nasal BID    ipratropium-albuterol  1 ampule Inhalation Q4H WA    tamsulosin  0.4 mg Oral Daily       IV Infusions (if any):   dextrose      DOBUTamine 1 mcg/kg/min (09/03/21 0943)       DIAGNOSTIC/ LABORATORY DATA:  Labs:   CBC:   Recent Labs     09/02/21  1715 09/03/21  0140   WBC 19.7* 15.5*   HGB 11.8* 11.6*   HCT 37.2 36.3*   * 129*     BMP:   Recent Labs     09/02/21  1715 09/03/21  0140    134   K 5.2* 4.8   CO2 25 24   BUN 30* 28*   CREATININE 1.2 1.1   LABGLOM >60 >60   CALCIUM 8.7 8.4*     Mag:   Recent Labs     09/02/21  0430 09/03/21  0140   MG 2.5 2.3     Phos: No results for input(s): PHOS in the last 72 hours.   TFT: No results found for: TSH, F7QDTBC, W8VIJQW, THYROIDAB, FT3, T4FREE   HgA1c:   Lab Results   Component Value Date    LABA1C 8.0 (H) 08/26/2021     No results found for: EAG    BNP: No results for input(s): BNP in the last 72 hours. PT/INR:   No results for input(s): PROTIME, INR in the last 72 hours. APTT:  No results for input(s): APTT in the last 72 hours. CARDIAC ENZYMES:No results for input(s): CKTOTAL, CKMB, CKMBINDEX, TROPHS in the last 72 hours. FASTING LIPID PANEL:  Lab Results   Component Value Date    CHOL 124 06/18/2021    HDL 33 06/22/2021    LDLCALC 87 06/22/2021    TRIG 70 06/18/2021     Lab Results   Component Value Date    LACTA 1.1 09/01/2021     CXR: 8/30/2021   Stable postoperative chest with tubes and catheters as noted.       Mild CHF. Telemetry reviewed (date: 9/3/2021): atrial fibrillation, rate 90's    Echocardiogram: 6/15/21   Left ventricle is moderately enlarged . Ejection fraction is visually estimated at 15-20%. Overall ejection fraction severely decreased . Severe global hypokinesis. Normal left ventricle wall thickness. Stage III diastolic dysfunction. Moderately dilated right ventricle. Right ventricle global systolic function is low normal . TAPSE 17 mm. There is severe low flow, low gradient aortic stenosis with valve area of   0.8 sq cm by continuity equation. Aortic valve peak velocity 2.8 m/s, mean   gradient 19 mmHg, DVI 0.22, SVI 15 ml/m2. Mild tricuspid regurgitation. RVSP is 58 mmHg. Pulmonary hypertension is moderate . No evidence for hemodynamically significant pericardial effusion. GABRIELE: 7/12/2021 Ejection fraction is visually estimated at 20-25%. The aortic valve is bicuspid, with fusion of the right and left coronary cusps. Probably severe low-flow low-gradient aortic stenosis is present. Mean gradient 30 mmHg. YESI 0.8 cm2. YESI by planimetry 1.2 cm2. Ascending aorta and root are dilated.     Cardiac catheterization: 6/21/2021 (Dr. Murriel Bumpers)  Findings:  Left main: 0%

## 2021-09-03 NOTE — PROGRESS NOTES
Comprehensive Nutrition Assessment    Type and Reason for Visit:  Initial, Consult, Patient Education    Nutrition Recommendations/Plan: Recommend and start Glucerna supplement TID and Nicola wound healing supplement BID to help meet increased nutritional needs from surgical wound healing. Nutrition Assessment:  Pt in bed stating fair appetite since surgery ; pt at nutritional risk d/t increased needs from surgical wound healing (s/p AVR/CABG x 2 on 8/30) ; hx of DM/CHF/CAD ; will start ONS    Malnutrition Assessment:  Malnutrition Status: At risk for malnutrition (Comment)    Context:  Acute Illness     Findings of the 6 clinical characteristics of malnutrition:  Energy Intake:  Mild decrease in energy intake (Comment) (x 4 days since admission)  Weight Loss:  No significant weight loss     Body Fat Loss:  No significant body fat loss     Muscle Mass Loss:  No significant muscle mass loss    Fluid Accumulation:  No significant fluid accumulation     Strength:  Not Performed    Estimated Daily Nutrient Needs:  Energy (kcal):  1779-4697 (REE 2222 x 1.2 SF); Weight Used for Energy Requirements:  Current     Protein (g):  130-160 (1.5-1.8g/kg IBW); Weight Used for Protein Requirements:  Ideal        Fluid (ml/day):  5124-2152; Method Used for Fluid Requirements:  1 ml/kcal      Nutrition Related Findings:  -I&Os (-3.1 L), 1+ edema, A&O x 4, missing teeth, active BS, constipation, obesity      Wounds:  Multiple, Surgical Incision (Incision x 2)       Current Nutrition Therapies:    ADULT DIET; Regular; 4 carb choices (60 gm/meal);  Low Fat/Low Chol/High Fiber/MARI    Anthropometric Measures:  · Height: 6' 3\" (190.5 cm)  · Current Body Weight: 293 lb (132.9 kg) (9/3, standing scale)   · Admission Body Weight: 295 lb (133.8 kg) (9/2, bedscale)    · Usual Body Weight:  (EMR shows past weights of 282# standing scale on 8/10/21 and 261# standing scale on 6/17/21)     · Ideal Body Weight: 196 lbs; % Ideal Body Weight 149.5 %   · BMI: 36.6  · BMI Categories: Obese Class 2 (BMI 35.0 -39.9)       Nutrition Diagnosis:   · Increased nutrient needs related to increase demand for energy/nutrients as evidenced by wounds (surgical)      Nutrition Interventions:   Food and/or Nutrient Delivery:  Continue Current Diet, Start Oral Nutrition Supplement  Nutrition Education/Counseling:  Education completed   Coordination of Nutrition Care:  Continue to monitor while inpatient    Goals:  Pt will consume ~75% of meals served       Nutrition Monitoring and Evaluation:   Behavioral-Environmental Outcomes:  None Identified   Food/Nutrient Intake Outcomes:  Food and Nutrient Intake, Supplement Intake  Physical Signs/Symptoms Outcomes:  Biochemical Data, Chewing or Swallowing, Constipation, GI Status, Fluid Status or Edema, Hemodynamic Status, Meal Time Behavior, Nutrition Focused Physical Findings, Skin, Weight     Discharge Planning:     Too soon to determine     Electronically signed by Jaclyn Caldwell RD, LD on 9/3/21 at 11:36 AM EDT    Contact: [phone]

## 2021-09-04 LAB
ALBUMIN SERPL-MCNC: 3.1 G/DL (ref 3.5–5.2)
ALP BLD-CCNC: 63 U/L (ref 40–129)
ALT SERPL-CCNC: 14 U/L (ref 0–40)
ANION GAP SERPL CALCULATED.3IONS-SCNC: 7 MMOL/L (ref 7–16)
ANION GAP SERPL CALCULATED.3IONS-SCNC: 9 MMOL/L (ref 7–16)
AST SERPL-CCNC: 20 U/L (ref 0–39)
BILIRUB SERPL-MCNC: 0.7 MG/DL (ref 0–1.2)
BUN BLDV-MCNC: 22 MG/DL (ref 6–23)
BUN BLDV-MCNC: 25 MG/DL (ref 6–23)
CALCIUM SERPL-MCNC: 8.5 MG/DL (ref 8.6–10.2)
CALCIUM SERPL-MCNC: 8.5 MG/DL (ref 8.6–10.2)
CHLORIDE BLD-SCNC: 97 MMOL/L (ref 98–107)
CHLORIDE BLD-SCNC: 98 MMOL/L (ref 98–107)
CO2: 27 MMOL/L (ref 22–29)
CO2: 29 MMOL/L (ref 22–29)
CREAT SERPL-MCNC: 1 MG/DL (ref 0.7–1.2)
CREAT SERPL-MCNC: 1 MG/DL (ref 0.7–1.2)
GFR AFRICAN AMERICAN: >60
GFR AFRICAN AMERICAN: >60
GFR NON-AFRICAN AMERICAN: >60 ML/MIN/1.73
GFR NON-AFRICAN AMERICAN: >60 ML/MIN/1.73
GLUCOSE BLD-MCNC: 202 MG/DL (ref 74–99)
GLUCOSE BLD-MCNC: 86 MG/DL (ref 74–99)
HCT VFR BLD CALC: 36.8 % (ref 37–54)
HEMOGLOBIN: 11.7 G/DL (ref 12.5–16.5)
MAGNESIUM: 2.3 MG/DL (ref 1.6–2.6)
MCH RBC QN AUTO: 30.7 PG (ref 26–35)
MCHC RBC AUTO-ENTMCNC: 31.8 % (ref 32–34.5)
MCV RBC AUTO: 96.6 FL (ref 80–99.9)
METER GLUCOSE: 157 MG/DL (ref 74–99)
METER GLUCOSE: 177 MG/DL (ref 74–99)
METER GLUCOSE: 206 MG/DL (ref 74–99)
METER GLUCOSE: 86 MG/DL (ref 74–99)
PDW BLD-RTO: 15.8 FL (ref 11.5–15)
PLATELET # BLD: 129 E9/L (ref 130–450)
PMV BLD AUTO: 11.5 FL (ref 7–12)
POTASSIUM SERPL-SCNC: 4.5 MMOL/L (ref 3.5–5)
POTASSIUM SERPL-SCNC: 5.1 MMOL/L (ref 3.5–5)
RBC # BLD: 3.81 E12/L (ref 3.8–5.8)
SODIUM BLD-SCNC: 133 MMOL/L (ref 132–146)
SODIUM BLD-SCNC: 134 MMOL/L (ref 132–146)
TOTAL PROTEIN: 5.8 G/DL (ref 6.4–8.3)
WBC # BLD: 13 E9/L (ref 4.5–11.5)

## 2021-09-04 PROCEDURE — 82962 GLUCOSE BLOOD TEST: CPT

## 2021-09-04 PROCEDURE — 2140000000 HC CCU INTERMEDIATE R&B

## 2021-09-04 PROCEDURE — 83735 ASSAY OF MAGNESIUM: CPT

## 2021-09-04 PROCEDURE — 6370000000 HC RX 637 (ALT 250 FOR IP): Performed by: NURSE PRACTITIONER

## 2021-09-04 PROCEDURE — 85027 COMPLETE CBC AUTOMATED: CPT

## 2021-09-04 PROCEDURE — 80053 COMPREHEN METABOLIC PANEL: CPT

## 2021-09-04 PROCEDURE — 6370000000 HC RX 637 (ALT 250 FOR IP): Performed by: INTERNAL MEDICINE

## 2021-09-04 PROCEDURE — 99254 IP/OBS CNSLTJ NEW/EST MOD 60: CPT | Performed by: INTERNAL MEDICINE

## 2021-09-04 PROCEDURE — 6370000000 HC RX 637 (ALT 250 FOR IP): Performed by: PHYSICIAN ASSISTANT

## 2021-09-04 PROCEDURE — 99233 SBSQ HOSP IP/OBS HIGH 50: CPT | Performed by: INTERNAL MEDICINE

## 2021-09-04 PROCEDURE — 6360000002 HC RX W HCPCS: Performed by: NURSE PRACTITIONER

## 2021-09-04 PROCEDURE — 94640 AIRWAY INHALATION TREATMENT: CPT

## 2021-09-04 PROCEDURE — 36415 COLL VENOUS BLD VENIPUNCTURE: CPT

## 2021-09-04 PROCEDURE — 80048 BASIC METABOLIC PNL TOTAL CA: CPT

## 2021-09-04 PROCEDURE — 2580000003 HC RX 258: Performed by: NURSE PRACTITIONER

## 2021-09-04 PROCEDURE — 93798 PHYS/QHP OP CAR RHAB W/ECG: CPT

## 2021-09-04 PROCEDURE — 2700000000 HC OXYGEN THERAPY PER DAY

## 2021-09-04 RX ORDER — PREDNISONE 20 MG/1
60 TABLET ORAL DAILY
Status: COMPLETED | OUTPATIENT
Start: 2021-09-04 | End: 2021-09-05

## 2021-09-04 RX ORDER — INSULIN GLARGINE 100 [IU]/ML
30 INJECTION, SOLUTION SUBCUTANEOUS NIGHTLY
Status: DISCONTINUED | OUTPATIENT
Start: 2021-09-04 | End: 2021-09-05

## 2021-09-04 RX ADMIN — FOLIC ACID 1 MG: 1 TABLET ORAL at 08:33

## 2021-09-04 RX ADMIN — INSULIN GLARGINE 30 UNITS: 100 INJECTION, SOLUTION SUBCUTANEOUS at 22:04

## 2021-09-04 RX ADMIN — HEPARIN 300 UNITS: 100 SYRINGE at 22:15

## 2021-09-04 RX ADMIN — AMIODARONE HYDROCHLORIDE 200 MG: 200 TABLET ORAL at 08:33

## 2021-09-04 RX ADMIN — FERROUS SULFATE TAB 325 MG (65 MG ELEMENTAL FE) 325 MG: 325 (65 FE) TAB at 17:14

## 2021-09-04 RX ADMIN — TAMSULOSIN HYDROCHLORIDE 0.4 MG: 0.4 CAPSULE ORAL at 08:33

## 2021-09-04 RX ADMIN — PIPERACILLIN AND TAZOBACTAM 3375 MG: 3; .375 INJECTION, POWDER, LYOPHILIZED, FOR SOLUTION INTRAVENOUS at 17:14

## 2021-09-04 RX ADMIN — INSULIN LISPRO 8 UNITS: 100 INJECTION, SOLUTION INTRAVENOUS; SUBCUTANEOUS at 11:35

## 2021-09-04 RX ADMIN — Medication 10 ML: at 22:10

## 2021-09-04 RX ADMIN — ASPIRIN 81 MG: 81 TABLET ORAL at 08:33

## 2021-09-04 RX ADMIN — PIPERACILLIN AND TAZOBACTAM 3375 MG: 3; .375 INJECTION, POWDER, LYOPHILIZED, FOR SOLUTION INTRAVENOUS at 00:58

## 2021-09-04 RX ADMIN — ATORVASTATIN CALCIUM 40 MG: 80 TABLET, FILM COATED ORAL at 21:58

## 2021-09-04 RX ADMIN — BISACODYL 10 MG: 10 SUPPOSITORY RECTAL at 17:14

## 2021-09-04 RX ADMIN — Medication 400 MG: at 08:32

## 2021-09-04 RX ADMIN — IPRATROPIUM BROMIDE AND ALBUTEROL SULFATE 1 AMPULE: .5; 2.5 SOLUTION RESPIRATORY (INHALATION) at 15:35

## 2021-09-04 RX ADMIN — DOCUSATE SODIUM 50 MG AND SENNOSIDES 8.6 MG 1 TABLET: 8.6; 5 TABLET, FILM COATED ORAL at 21:59

## 2021-09-04 RX ADMIN — ENOXAPARIN SODIUM 40 MG: 40 INJECTION SUBCUTANEOUS at 08:33

## 2021-09-04 RX ADMIN — DOCUSATE SODIUM 50 MG AND SENNOSIDES 8.6 MG 1 TABLET: 8.6; 5 TABLET, FILM COATED ORAL at 08:34

## 2021-09-04 RX ADMIN — FERROUS SULFATE TAB 325 MG (65 MG ELEMENTAL FE) 325 MG: 325 (65 FE) TAB at 08:33

## 2021-09-04 RX ADMIN — AMIODARONE HYDROCHLORIDE 200 MG: 200 TABLET ORAL at 13:48

## 2021-09-04 RX ADMIN — INSULIN LISPRO 2 UNITS: 100 INJECTION, SOLUTION INTRAVENOUS; SUBCUTANEOUS at 11:34

## 2021-09-04 RX ADMIN — INSULIN LISPRO 6 UNITS: 100 INJECTION, SOLUTION INTRAVENOUS; SUBCUTANEOUS at 08:34

## 2021-09-04 RX ADMIN — PIPERACILLIN AND TAZOBACTAM 3375 MG: 3; .375 INJECTION, POWDER, LYOPHILIZED, FOR SOLUTION INTRAVENOUS at 08:44

## 2021-09-04 RX ADMIN — OXYCODONE HYDROCHLORIDE AND ACETAMINOPHEN 2 TABLET: 5; 325 TABLET ORAL at 09:02

## 2021-09-04 RX ADMIN — PREDNISONE 60 MG: 20 TABLET ORAL at 09:02

## 2021-09-04 RX ADMIN — HEPARIN 300 UNITS: 100 SYRINGE at 22:01

## 2021-09-04 RX ADMIN — OXYCODONE HYDROCHLORIDE AND ACETAMINOPHEN 500 MG: 500 TABLET ORAL at 21:59

## 2021-09-04 RX ADMIN — OXYCODONE HYDROCHLORIDE AND ACETAMINOPHEN 2 TABLET: 5; 325 TABLET ORAL at 00:57

## 2021-09-04 RX ADMIN — BISACODYL 5 MG: 5 TABLET, COATED ORAL at 08:33

## 2021-09-04 RX ADMIN — SODIUM CHLORIDE, PRESERVATIVE FREE 10 ML: 5 INJECTION INTRAVENOUS at 08:32

## 2021-09-04 RX ADMIN — HEPARIN 300 UNITS: 100 SYRINGE at 08:32

## 2021-09-04 RX ADMIN — HEPARIN 300 UNITS: 100 SYRINGE at 04:53

## 2021-09-04 RX ADMIN — OXYCODONE HYDROCHLORIDE AND ACETAMINOPHEN 2 TABLET: 5; 325 TABLET ORAL at 04:57

## 2021-09-04 RX ADMIN — AMIODARONE HYDROCHLORIDE 200 MG: 200 TABLET ORAL at 21:59

## 2021-09-04 RX ADMIN — OXYCODONE HYDROCHLORIDE AND ACETAMINOPHEN 2 TABLET: 5; 325 TABLET ORAL at 23:33

## 2021-09-04 RX ADMIN — SODIUM CHLORIDE, PRESERVATIVE FREE 10 ML: 5 INJECTION INTRAVENOUS at 00:58

## 2021-09-04 RX ADMIN — SODIUM CHLORIDE, PRESERVATIVE FREE 10 ML: 5 INJECTION INTRAVENOUS at 04:52

## 2021-09-04 RX ADMIN — SODIUM CHLORIDE, PRESERVATIVE FREE 10 ML: 5 INJECTION INTRAVENOUS at 04:53

## 2021-09-04 RX ADMIN — MAGNESIUM HYDROXIDE 30 ML: 400 SUSPENSION ORAL at 08:33

## 2021-09-04 RX ADMIN — PANTOPRAZOLE SODIUM 40 MG: 40 TABLET, DELAYED RELEASE ORAL at 08:33

## 2021-09-04 RX ADMIN — SODIUM CHLORIDE, PRESERVATIVE FREE 10 ML: 5 INJECTION INTRAVENOUS at 21:59

## 2021-09-04 RX ADMIN — INSULIN LISPRO 1 UNITS: 100 INJECTION, SOLUTION INTRAVENOUS; SUBCUTANEOUS at 22:03

## 2021-09-04 RX ADMIN — OXYCODONE HYDROCHLORIDE AND ACETAMINOPHEN 500 MG: 500 TABLET ORAL at 08:32

## 2021-09-04 ASSESSMENT — PAIN SCALES - GENERAL
PAINLEVEL_OUTOF10: 0
PAINLEVEL_OUTOF10: 7
PAINLEVEL_OUTOF10: 7
PAINLEVEL_OUTOF10: 0
PAINLEVEL_OUTOF10: 7
PAINLEVEL_OUTOF10: 0
PAINLEVEL_OUTOF10: 7
PAINLEVEL_OUTOF10: 3
PAINLEVEL_OUTOF10: 0

## 2021-09-04 ASSESSMENT — PAIN DESCRIPTION - DESCRIPTORS
DESCRIPTORS: SORE;ACHING;DISCOMFORT

## 2021-09-04 ASSESSMENT — PAIN DESCRIPTION - PROGRESSION
CLINICAL_PROGRESSION: NOT CHANGED

## 2021-09-04 ASSESSMENT — PAIN DESCRIPTION - ONSET
ONSET: GRADUAL
ONSET: GRADUAL
ONSET: ON-GOING
ONSET: ON-GOING

## 2021-09-04 ASSESSMENT — PAIN DESCRIPTION - ORIENTATION
ORIENTATION: MID

## 2021-09-04 ASSESSMENT — PAIN - FUNCTIONAL ASSESSMENT
PAIN_FUNCTIONAL_ASSESSMENT: ACTIVITIES ARE NOT PREVENTED

## 2021-09-04 ASSESSMENT — PAIN DESCRIPTION - FREQUENCY
FREQUENCY: INTERMITTENT

## 2021-09-04 ASSESSMENT — PAIN DESCRIPTION - LOCATION
LOCATION: STERNUM;INCISION

## 2021-09-04 ASSESSMENT — PAIN DESCRIPTION - PAIN TYPE
TYPE: SURGICAL PAIN

## 2021-09-04 NOTE — PROGRESS NOTES
INPATIENT CARDIOLOGY FOLLOW-UP    Name: Manish Yip    Age: 63 y.o. Date of Admission: 8/30/2021  5:21 AM    Date of Service: 9/4/2021    Primary Cardiologist: Known to me    Chief Complaint: Follow-up for CAD, AS, CHF status post CABG/AVR    Interim History:  Moved out of CVICU  On very low-dose dobutamine 0.5 mcg/kg/min. Overall feeling okay. Denies chest pain or shortness of breath. On 4 L nasal cannula. S/p Sternotomy/AVR with 27mm Mane Inspiris bioprosthetic/Mitral valve repair by decalcification of the anterior leaflet of the mitral valve/Reduction aortoplasty/CABG x 2 (LIMA-LAD, SVG-Diag 1)/DERRICK exclusion with 35 mm AtriClip/Rigid internal fixation of the sternum with KLS plates x 22 modifier on 8/30/2021.      Review of Systems:   Negative except as described above    Problem List:  Patient Active Problem List   Diagnosis    Acute systolic congestive heart failure (HCC)    Class 2 severe obesity due to excess calories with serious comorbidity in adult [facility])    Uncontrolled type 2 diabetes mellitus with hyperglycemia ([facility] [address]    Essential hypertension    Severe aortic stenosis    Acute systolic CHF (congestive heart failure) ([facility] [address]    Heart failure ([facility] [address]    CAD in native artery    COVID-19    Acute pulmonary insufficiency       Current Medications:    Current Facility-Administered Medications:     predniSONE (DELTASONE) tablet 60 mg, 60 mg, Oral, Daily, Gonzalez Smith PA-C, 60 mg at 09/04/21 0902    insulin glargine (LANTUS) injection vial 30 Units, 30 Units, SubCUTAneous, Nightly, Gonzalez Smith PA-C    aspirin EC tablet 81 mg, 81 mg, Oral, Daily, Sarah Bolaños APRN - CNP, 81 mg at 09/04/21 0098    acetaminophen (TYLENOL) tablet 650 mg, 650 mg, Oral, Q4H PRN, SHIRA Johnson - CNP    oxyCODONE-acetaminophen (PERCOCET) 5-325 MG per tablet 1 tablet, 1 tablet, Oral, Q4H PRN, 1 tablet at 09/03/21 8843 **OR** oxyCODONE-acetaminophen (PERCOCET) 5-325 MG per tablet 2 tablet, 2 tablet, Oral, Q4H PRN, Slade Reading, APRN - CNP, 2 tablet at 09/04/21 0902    bisacodyl (DULCOLAX) EC tablet 5 mg, 5 mg, Oral, Daily, Slade Reading, APRN - CNP, 5 mg at 09/04/21 9806    sennosides-docusate sodium (SENOKOT-S) 8.6-50 MG tablet 1 tablet, 1 tablet, Oral, BID, Slade Reading, APRN - CNP, 1 tablet at 09/04/21 5898    magnesium hydroxide (MILK OF MAGNESIA) 400 MG/5ML suspension 30 mL, 30 mL, Oral, Daily, Slade Reading, APRN - CNP, 30 mL at 09/04/21 1866    ferrous sulfate (IRON 325) tablet 325 mg, 325 mg, Oral, BID WC, Slade Reading, APRN - CNP, 325 mg at 09/04/21 9170    ascorbic acid (VITAMIN C) tablet 500 mg, 500 mg, Oral, BID, Slade Reading, APRN - CNP, 500 mg at 16/82/27 5046    folic acid (FOLVITE) tablet 1 mg, 1 mg, Oral, Daily, Slade Reading, APRN - CNP, 1 mg at 09/04/21 3943    potassium chloride (KLOR-CON M) extended release tablet 20 mEq, 20 mEq, Oral, PRN, Slade Reading, APRN - CNP    bisacodyl (DULCOLAX) suppository 10 mg, 10 mg, Rectal, Daily PRN, Slade Reading, APRN - CNP    pantoprazole (PROTONIX) tablet 40 mg, 40 mg, Oral, Daily, Slade Reading, APRN - CNP, 40 mg at 09/04/21 7291    sodium chloride (OCEAN, BABY AYR) 0.65 % nasal spray 1 spray, 1 spray, Each Nostril, PRN, Slade Reading, APRN - CNP    trimethobenzamide Lamar Stalker) injection 200 mg, 200 mg, IntraMUSCular, Q6H PRN, Slade Reading, APRN - CNP    morphine (PF) injection 2 mg, 2 mg, IntraVENous, Q4H PRN, Jean Black, APRN - CNP    sodium chloride flush 0.9 % injection 5-40 mL, 5-40 mL, IntraVENous, 2 times per day, Slade Reading, APRN - CNP, 10 mL at 09/03/21 2119    sodium chloride flush 0.9 % injection 5-40 mL, 5-40 mL, IntraVENous, PRN, Slade Hopkins, SHIRA - CNP    heparin flush 100 UNIT/ML injection 300 Units, 3 mL, IntraVENous, 2 times per day, SHIRA Sarmiento - CNP, 300 Units at 09/04/21 0832    heparin flush 100 UNIT/ML injection 300 Units, 3 mL, IntraCATHeter, PRN, Adam Drain, APRN - CNP, 300 Units at 09/04/21 0453    piperacillin-tazobactam (ZOSYN) 3,375 mg in dextrose 5 % 100 mL IVPB extended infusion (mini-bag), 3,375 mg, IntraVENous, Q8H, Adam Drain, APRN - CNP, Last Rate: 25 mL/hr at 09/04/21 0844, 3,375 mg at 09/04/21 0844    amiodarone (CORDARONE) tablet 200 mg, 200 mg, Oral, TID, Adam Drain, APRN - CNP, 200 mg at 09/04/21 0383    enoxaparin (LOVENOX) injection 40 mg, 40 mg, SubCUTAneous, Daily, Adam Drain, APRN - CNP, 40 mg at 09/04/21 0833    insulin lispro (HUMALOG) injection vial 0-18 Units, 0-18 Units, SubCUTAneous, TID WC, Adam Drain, APRN - CNP, 6 Units at 09/04/21 0834    insulin lispro (HUMALOG) injection vial 0-9 Units, 0-9 Units, SubCUTAneous, Nightly, Adam Drain, APRN - CNP, 3 Units at 09/03/21 2125    atorvastatin (LIPITOR) tablet 40 mg, 40 mg, Oral, Nightly, Adam Drain, APRN - CNP, 40 mg at 09/03/21 2118    sodium chloride flush 0.9 % injection 10 mL, 10 mL, IntraVENous, 2 times per day, Adam Drain, APRN - CNP, 10 mL at 09/04/21 3420    sodium chloride flush 0.9 % injection 10 mL, 10 mL, IntraVENous, PRN, Adam Drain, APRN - CNP, 10 mL at 09/04/21 0453    magnesium oxide (MAG-OX) tablet 400 mg, 400 mg, Oral, Daily, Adam Drain, APRN - CNP, 400 mg at 09/04/21 0832    ipratropium-albuterol (DUONEB) nebulizer solution 1 ampule, 1 ampule, Inhalation, Q4H WA, Adam Drain, APRN - CNP, 1 ampule at 09/03/21 2103    glucose (GLUTOSE) 40 % oral gel 15 g, 15 g, Oral, PRN, Adam Drain, APRN - CNP    dextrose 50 % IV solution, 12.5 g, IntraVENous, PRN, Adam Drain, APRN - CNP, 12.5 g at 08/31/21 0909    glucagon (rDNA) injection 1 mg, 1 mg, IntraMUSCular, PRN, Adam Drain, APRN - CNP    dextrose 5 % solution, 100 mL/hr, IntraVENous, PRN, Adam Drain, APRN - CNP    tamsulosin [facility]) capsule 0.4 mg, 0.4 mg, Oral, Daily, Adam Drain, APRN - CNP, 0.4 mg at 09/04/21 0833    DOBUTamine 21  1715 21  0140 21  0455   WBC 19.7* 15.5* 13.0*   RBC 3.81 3.83 3.81   HGB 11.8* 11.6* 11.7*   HCT 37.2 36.3* 36.8*   MCV 97.6 94.8 96.6   MCH 31.0 30.3 30.7   MCHC 31.7* 32.0 31.8*   RDW 16.3* 16.0* 15.8*   * 129* 129*   MPV 11.5 11.4 11.5     No results found for: CKTOTAL, CKMB, CKMBINDEX, TROPONINI  Lab Results   Component Value Date    INR 1.8 2021    INR 1.3 2021    PROTIME 19.2 (H) 2021    PROTIME 14.5 (H) 2021     No results found for: TSHFT4, TSH  Lab Results   Component Value Date    LABA1C 8.0 (H) 2021     No results found for: EAG  Lab Results   Component Value Date    CHOL 124 2021     Lab Results   Component Value Date    TRIG 70 2021     Lab Results   Component Value Date    HDL 33 2021    HDL 35 2021     Lab Results   Component Value Date    LDLCALC 87 2021    LDLCALC 75 2021     Lab Results   Component Value Date    LABVLDL 22 2021    LABVLDL 14 2021     No results found for: CHOLHDLRATIO  No results for input(s): PROBNP in the last 72 hours. Cardiac Tests:    EK/3/2021 atrial fibrillation 92 bpm.  Left axis deviation. IVCD QRS duration 130 ms.    2021: Sinus rhythm 98 bpm.  Left axis. IVCD QRS duration 128 ms. Telemetry: Atrial fibrillation 90s, PVCs    Chest X-ray:   9/3/2021       Impression   Right IJ catheter is removed.  Mild to moderate pulmonary vascular congestion. Echocardiogram:   GABRIELE 2021   Summary   Ejection fraction is visually estimated at 20-25%. The aortic valve is bicuspid, with fusion of the right and left coronary   cusps. Probably severe low-flow low-gradient aortic stenosis is present. Mean gradient 30 mmHg. YESI 0.8 cm2. YESI by planimetry 1.2 cm2. Ascending aorta and root are dilated. TTE 6/15/2021   Summary   Left ventricle is moderately enlarged . Ejection fraction is visually estimated at 15-20%.    Overall ejection fraction severely decreased . Severe global hypokinesis. Normal left ventricle wall thickness. Stage III diastolic dysfunction. Moderately dilated right ventricle. Right ventricle global systolic function is low normal . TAPSE 17 mm. There is severe low flow, low gradient aortic stenosis with valve area of   0.8 sq cm by continuity equation. Aortic valve peak velocity 2.8 m/s, mean   gradient 19 mmHg, DVI 0.22, SVI 15 ml/m2. Mild tricuspid regurgitation. RVSP is 58 mmHg. Pulmonary hypertension is moderate . No evidence for hemodynamically significant pericardial effusion. Stress test:    Dobutamine stress echo 6/21/2021     Summary   Dobutamine stress echocardiogram for assessment of low flow, low gradient   aortic stenosis. True severe aortic stenosis is demonstrated. The aortic valve area is 0.7 cm2 with a maximum gradient of 72 mmHg and a   mean gradient of 46 mmHg. The YESI decreased from 0.7 cm2 to 0.6 cm2 at peak. The dimensionless index   remained less than 0.25 at all levels. The aortic valve peak velocity, peak gradient and mean gradient are all in   the severe range. There is severe global hypokinesis noted. All segments suggest viability. The contractile reserve is 50% suggested benefit to prognostication of   outcome with aortic valve intervention. Cardiac catheterization:   Left heart cath 6/21/2021    CONCLUSIONS:  1. Coronary artery disease:  A. Left main. No significant disease. B.  LAD. Eccentric 80% to 90% very proximal vessel narrowing and 70% to  80% mid vessel disease after a moderate size diagonal branch with  myocardial bridging at the site. C. Intermediate ramus. Relatively large vessel with 95% proximal  vessel subtotal occlusion. D.  LCX. Large but nondominant vessel with mild luminal irregularities  and with 70% to 80% ostial narrowing of a small to moderate size  marginal branch. E.  RCA.   Dominant vessel with around 30% to 40% proximal and mid and  distal vessel disease, and around 40% disease of the large posterior  descending artery branch.    ----------------------------------------------------------------------------------------------------------------------------------------------------------------  IMPRESSION:  1. Coronary artery disease status post CABG (LIMA-LAD, V-D1) 8/30/2021  2. Bicuspid aortic valve with severe AS s/p AVR with 27 mm Mane Inspiris bioprosthetic  3. MR status post mitral repair with decalcification of anterior leaflet  4. Aortic aneurysm status post reduction aortoplasty  5. DERRICK occlusion 35 mm atrial clip  6. Acute on chronic HFrEF/cardiogenic shock improving  7. Cardiomyopathy ischemic, valvular. EF 20% preoperatively  8. Postoperative paroxysmal atrial fibrillation  9. Postoperative hypoxemia  10. Borderline hyperkalemia  11. Anemia Hgb 11.7  12. Type 2 diabetes, A1c 8.0% poorly controlled  13. COVID-19 infection 11/2020  14. Obesity BMI 36.5 kg/m²  15. BPH    RECOMMENDATIONS:  Improving post surgery with hemodynamic instability postoperatively.  Weaning dobutamine per CTS   Once off inotropes, initiate GDMT recommend metoprolol succinate and sacubitril/valsartan initially   Avoid spironolactone due to hyperkalemia   Recommend SGLT2 inhibitor   WCD on discharge if EF less than 35%   Continue amiodarone   Recommend apixaban when okay from CT surgery standpoint for stroke risk reduction   Continue aspirin, statin   Aggressive risk factor modification    Tonya Rolon MD, [facility]    NOTE: This report was transcribed using voice recognition software. Every effort was made to ensure accuracy; however, inadvertent computerized transcription errors may be present.

## 2021-09-04 NOTE — PROGRESS NOTES
Dr. Law Rahman regarding afternoon sugar of 86. Asked if he would like the 8 units straight held or given. Waiting for new orders.

## 2021-09-04 NOTE — PROGRESS NOTES
POD# 5  Awake, alert. No complaints. Denies CP, palpitations, SOB at rest, dizziness/lightheadedness. Vitals:    09/04/21 0400 09/04/21 0546 09/04/21 0700 09/04/21 0804   BP: (!) 168/100  (!) 146/93    Pulse: 94  76    Resp: 20  20    Temp: 96.8 °F (36 °C)  96 °F (35.6 °C)    TempSrc: Temporal  Temporal    SpO2: 96%  96% (!) 88%   Weight:  292 lb 3.2 oz (132.5 kg)     Height:         O2: 4L/NC      Intake/Output Summary (Last 24 hours) at 9/4/2021 0836  Last data filed at 9/4/2021 0659  Gross per 24 hour   Intake 682 ml   Output 1185 ml   Net -503 ml         UO:600mL/8hr       Recent Labs     09/02/21  1715 09/03/21  0140 09/04/21  0455   WBC 19.7* 15.5* 13.0*   HGB 11.8* 11.6* 11.7*   HCT 37.2 36.3* 36.8*   * 129* 129*      Recent Labs     09/02/21  1715 09/03/21  0140 09/04/21  0455   BUN 30* 28* 25*   CREATININE 1.2 1.1 1.0           Telemetry: Afib       PE  Cardiac: IRRR  Lungs: decreased bases  Chest incision with intact JONE DSD. Sternum stable. Prior chest tube site incisions C/D/I, no erythema with intact sutures. Epicardial pacing wires present and secure. Abd: Soft, nontender, +BS  Ext: Incisions C/D/I, approximated, no erythema, + edema          A/P: POD# 5     1. CAD, AS, ischemic cardiomyopathy   --Stable s/p AVR, MVrp, CABG, DERRICK exclusion   --Post op GABRIELE reveals 20%  EF- on inotropes    --will order TTE prior to discharge, once off Dobut to establish need for Life vest   --Scr stable  -- ASA/ statin - plan to start BB once off inotropic support   --reinforce sternal precautions  --continue epicardial pacing wires  -- All CTs out   -- DC solu-cortef IV today, transition to PO     2. Expected acute blood loss anemia secondary to open heart surgery  --stable  -- hgb 11.7        3. Afib   --Cont PO amio  -- Plan to add BB once off inotropic support  -- Eliquis prior to DC ( DERRICK exclusion in OR )         4.  Expected acute respiratory insufficiency in the setting following surgery  --wean oxygen to keep SpO2 greater than or equal to 92%  --continue duonebs with ezpap  --encourage C&DB, SMI  --currently on 4L O2/NC  -- Empiric zosyn Day 4      5.  Acute on chronic systolic heart failure  - ICM; EF ~20%  -DOS 0.09units/min vasopressin, 16mcq/min epinephrine and 5 mcq/kg/min dobutamine  -POD1- hemodynamics much improved, lactic downtrending, creatinine and lfts NML-- down to 0.05 units vasopressin, 10mcq/min epinephrine, dobutamine up to 7.5mcq. CI 2.2, CO 5.6, SVR 1278, mixed venous 83  -POD2-shock resolved, off vasopressin, epinephrine, remains on dobutamine  -POD3-Dobutamine dropped to 2mcq, plan to slowly wean off then introduce heart failure meds   - POD4 Dobut decreased to 1mcq, plan to cont slowly wean, then introduce GDMT   - POD 5 Dobut dec to 0.5 mg, if hemodynamics remain stable this evening, will shut off - plan to introduce GDMT once off inotropes         6. Constipation--expected delayed return of bowel function  --secondary to anesthesia, narcotics, decreased oral intake, and decreased physical activity   --no BM since prior to surgery  --Continue daily MOM/oral bisacodyl until +BM and senna-s as scheduled. --Will give daily suppository until +BM starting POD 3 if no result by then   --Encouraged continued increase in oral intake and activity.      7. Uncontrolled DMII  - Hemoglobin A1c 8.0  - SSI/nightly lantus - up-titrate lantus   - Monitor closely while on steroids   - will consult endocrine          6. Expected deconditioning in the setting following surgery  --Increase activity as tolerated  --PT/OT  -- ambulating 90 ft               DVT prophylaxis:  --continue bilateral knee high LILIANA hose  --continue PCDs  --continue progressive ambulation  -- lovenox for dvt prophylaxis and continue knee high LILIANA hose/pcds/progressive ambulation        Dispo:Discussed with SW, [facility] next [facility]          This patient's case and care plan was discussed with the attending surgeon

## 2021-09-04 NOTE — PLAN OF CARE
Problem: Pain:  Goal: Pain level will decrease  Description: Pain level will decrease  9/4/2021 0752 by Nani Coleman RN  Outcome: Met This Shift     Problem: Pain:  Goal: Control of acute pain  Description: Control of acute pain  Outcome: Met This Shift     Problem: Pain:  Goal: Control of chronic pain  Description: Control of chronic pain  Outcome: Met This Shift     Problem: Tissue Perfusion - Cardiopulmonary, Altered:  Goal: Absence of angina  Description: Absence of angina  Outcome: Met This Shift     Problem: Tissue Perfusion - Cardiopulmonary, Altered:  Goal: Hemodynamic stability will improve  Description: Hemodynamic stability will improve  Outcome: Met This Shift     Problem: Tissue Perfusion - Cardiopulmonary, Altered:  Goal: Will show no evidence of cardiac arrhythmias  Description: Will show no evidence of cardiac arrhythmias  Outcome: Met This Shift

## 2021-09-04 NOTE — CONSULTS
ENDOCRINOLOGY INITIAL CONSULTATION NOTE      Date of admission: 8/30/2021  Date of service: 9/4/2021  Admitting physician: Ailyn Lamb MD   Primary Care Physician: Lenore Jacob DO  Consultant physician: Jeremy Castorena MD     Reason for the consultation:  Uncontrolled DM    History of Present Illness: The history is provided by the patient. Accuracy of the patient data is excellent    Marely Vargas is a very pleasant 63 y.o. old male with PMH uncontrolled DM, severe aortic stenosis; severe multivessel coronary artery disease; severe ischemic cardiomyopathy with an ejection fraction of 15%listed below admitted to [address] [place] [address] on 8/30/2021 and underwent MVR and CABG x 2 on admission day , endocrine service was consulted for diabetes management. Prior to admission  The patient was diagnosed with type 2 DM many years ago ago. Prior to admission patient was on Lantus 50U nightly, Glyburide 5 mg BID . Patient has had no hypoglycemic episodes. Patient has not been eating consistent carbohydrate meals, self-blood glucose monitoring has been above goal prior to admission. In addition, patient reports both macrovascular and microvascular complications.  He is ovedue for  with yearly diabetic eye exam, last eye exam was 4 years ago   Lab Results   Component Value Date    LABA1C 8.0 08/26/2021       Inpatient diet:   Carb Restricted diet     Point of care glucose monitoring   (Independently reviewed)   Recent Labs     09/02/21  1134 09/02/21  1651 09/02/21 2021 09/03/21  0642 09/03/21  1106 09/03/21  1608 09/03/21  2046 09/04/21  0412   GLUMET 243* 226* 167* 173* 227* 207* 157* 206*       Past medical history:   Past Medical History:   Diagnosis Date    [facility] [facility]     Diabetes mellitus [facility] [address] [facility]     Hypertension        Past surgical history:  Past Surgical History:   Procedure Laterality Date    AORTIC MITRAL VALVE REPLACEMENT N/A 8/30/2021    AORTIC VAVLE REPLACEMENT, CORONARY ARTERY BYPASS, ATRIAL APPENDAGE CLIP, GABRIELE performed by Justine James MD at [address] [facility]  2021   [address]      at age of 2    TRANSESOPHAGEAL ECHOCARDIOGRAM  07/12/2021       Social history:   Tobacco:   reports that he quit smoking about 20 years ago. His smoking use included cigarettes. He started smoking about 48 years ago. He smoked 1.50 packs per day. He has never used smokeless tobacco.  Alcohol:   reports current alcohol use. Drugs:   reports no history of drug use.     Family history:    Family History   Problem Relation Age of Onset    Other Mother         esophageal tumor    Heart Disease Father     Breast Cancer Sister     No Known Problems Brother     No Known Problems Brother     No Known Problems Sister        Allergy and drug reactions:   No Known Allergies    Scheduled Meds:   predniSONE  60 mg Oral Daily    insulin glargine  30 Units SubCUTAneous Nightly    insulin lispro  8 Units SubCUTAneous TID WC    insulin lispro  0-12 Units SubCUTAneous TID WC    insulin lispro  0-6 Units SubCUTAneous Nightly    aspirin  81 mg Oral Daily    bisacodyl  5 mg Oral Daily    sennosides-docusate sodium  1 tablet Oral BID    magnesium hydroxide  30 mL Oral Daily    ferrous sulfate  325 mg Oral BID WC    vitamin C  500 mg Oral BID    folic acid  1 mg Oral Daily    pantoprazole  40 mg Oral Daily    sodium chloride flush  5-40 mL IntraVENous 2 times per day    heparin flush  3 mL IntraVENous 2 times per day    piperacillin-tazobactam  3,375 mg IntraVENous Q8H    amiodarone  200 mg Oral TID    enoxaparin  40 mg SubCUTAneous Daily    atorvastatin  40 mg Oral Nightly    sodium chloride flush  10 mL IntraVENous 2 times per day    magnesium oxide  400 mg Oral Daily    ipratropium-albuterol  1 ampule Inhalation Q4H WA    tamsulosin  0.4 mg Oral Daily     PRN Meds:   acetaminophen, 650 mg, Q4H PRN  oxyCODONE-acetaminophen, 1 tablet, Q4H PRN   Or  oxyCODONE-acetaminophen, 2 tablet, Q4H PRN  potassium chloride, 20 mEq, PRN  bisacodyl, 10 mg, Daily PRN  sodium chloride, 1 spray, PRN  trimethobenzamide, 200 mg, Q6H PRN  morphine, 2 mg, Q4H PRN  sodium chloride flush, 5-40 mL, PRN  heparin flush, 3 mL, PRN  sodium chloride flush, 10 mL, PRN  glucose, 15 g, PRN  dextrose, 12.5 g, PRN  glucagon (rDNA), 1 mg, PRN  dextrose, 100 mL/hr, PRN      Continuous Infusions:   dextrose      DOBUTamine 0.5 mcg/kg/min (09/04/21 0739)       Review of Systems  All systems reviewed. All negative except for symptoms mentioned in HPI     OBJECTIVE    BP (!) 146/93   Pulse 76   Temp 96 °F (35.6 °C) (Temporal)   Resp 20   Ht 6' 3\" (1.905 m)   Wt 292 lb 3.2 oz (132.5 kg)   SpO2 (!) 88% Comment: 90 before walk, 88 during and after walk  BMI 36.52 kg/m²     Intake/Output Summary (Last 24 hours) at 9/4/2021 1029  Last data filed at 9/4/2021 0659  Gross per 24 hour   Intake 562 ml   Output 1150 ml   Net -588 ml       Physical examination:  General: awake alert, oriented x3  HEENT: normocephalic non traumatic, no exophthalmos   Neck: supple,    Pulm: good equal air entry no added sounds  CVS: S1 + S2, s/p CABG   Abd: soft lax, no tenderness  Skin: warm, no lesions, no rash.    Neuro: CN intact, sensation decreased bilateral , muscle power normal  Psych: normal mood, and affect    Review of Laboratory Data:  I personally reviewed the following labs:   Recent Labs     09/02/21  1715 09/03/21  0140 09/04/21  0455   WBC 19.7* 15.5* 13.0*   RBC 3.81 3.83 3.81   HGB 11.8* 11.6* 11.7*   HCT 37.2 36.3* 36.8*   MCV 97.6 94.8 96.6   MCH 31.0 30.3 30.7   MCHC 31.7* 32.0 31.8*   RDW 16.3* 16.0* 15.8*   * 129* 129*   MPV 11.5 11.4 11.5     Recent Labs     09/02/21  1715 09/03/21  0140 09/04/21  0455    134 133   K 5.2* 4.8 5.1*   CL 98 99 97*   CO2 25 24 27   BUN 30* 28* 25*   CREATININE 1.2 1.1 1.0   GLUCOSE 240* 177* 202*   CALCIUM 8.7 8.4* 8.5*   PROT 6.1* 6.1* 5.8*   LABALBU 3.2* 3.2* 3.1*   BILITOT 0.6 0.7 0.7   ALKPHOS 84 81 63   AST 24 23 20   ALT 12 12 14     No results found for: BHYDRXBUT  Lab Results   Component Value Date    LABA1C 8.0 08/26/2021    LABA1C 8.4 08/10/2021    LABA1C 8.1 06/15/2021     No results found for: TSH, T4FREE, V4XNATR, FT3, B2XPGCG, TSI, TPOABS, THGAB  Lab Results   Component Value Date    LABA1C 8.0 08/26/2021    GLUCOSE 202 09/04/2021    GLUCOSE 259 04/13/2012     Lab Results   Component Value Date    TRIG 70 06/18/2021    HDL 33 06/22/2021    LDLCALC 87 06/22/2021    CHOL 124 06/18/2021       Blood culture   No results found for: [facility]    Radiology:  XR CHEST PORTABLE   Final Result   Right IJ catheter is removed. Mild to moderate pulmonary vascular congestion. XR CHEST PORTABLE   Final Result   1. Cardiomegaly. There are no findings of pneumonia or failure. 2. Stable position of the chest tubes. There is no right or left   pneumothorax. XR CHEST PORTABLE   Final Result   Left upper extremity PICC line terminates in the SVC. [place] catheter   removed. Stable cardiomegaly. Likely moderate size left-sided pleural effusion is   unchanged. Moderate pulmonary edema. XR CHEST PORTABLE   Final Result   1. There is no right or left pneumothorax. 2. Bilateral perihilar and lower lobe airspace disease. 3. Marked cardiomegaly. XR CHEST PORTABLE   Final Result   Mild cardiomegaly. Pulmonary vascularity appears less prominent than the   prior study         XR CHEST PORTABLE   Final Result   Stable postoperative chest with tubes and catheters as noted. Mild CHF.              Medical Records/Labs/Images review:   I personally reviewed and summarized previous records   All labs and imaging were reviewed independently     [address] [facility], a 63 y.o.-old male seen today for inpatient diabetes management     Diabetes Mellitus type 2  · Patient's diabetes is uncontrolled   · For now, will change diabetes regimen to:  · Lantus 30 U nightly   · Humalog 8 U with meals   · Medium dose sliding scale with meals and at night   · Continue glucose check with meals and at bedtime   · Will titrate insulin dose based on the blood glucose trend & insulin requirement  · Will likely add SGLT2-inhibitors and/or GLP-1 agonist to his regimen as an outpatient   · Pt will follow with us after discharge. Endocrine follow up visit, Thursday 9/16 at 3:30PM     Sever MVCAD   · S/p CABG surgery 8/30/2021   · Management per CT surgery and cardiology service   · Discussed the importance of controlling DM in management of CAD     Sever MV stenosis   · S/p MVR on 8/30/2021   · Management per Cardiology and CT surgery     The above issues were reviewed with the patient who understood and agreed with the plan. Thank you for allowing us to participate in the care of this patient. Please do not hesitate to contact us with any additional questions. Yoly Mcfadden MD  Endocrinologist, [place] [address] [facility]   [address] [place], [place] [address]   Phone: [phone]  Fax: [phone]  --------------------------------------------  An electronic signature was used to authenticate this note.  Camacho Walker MD on 9/4/2021 at 10:29 AM

## 2021-09-04 NOTE — PLAN OF CARE
Problem: Anxiety:  Goal: Level of anxiety will decrease  Description: Level of anxiety will decrease  Outcome: Met This Shift     Problem: Gas Exchange - Impaired:  Goal: Levels of oxygenation will improve  Description: Levels of oxygenation will improve  Outcome: Met This Shift     Problem: Pain:  Goal: Pain level will decrease  Description: Pain level will decrease  Outcome: Met This Shift     Problem: Pain:  Goal: Pain level will decrease  Description: Pain level will decrease  Outcome: Met This Shift

## 2021-09-05 LAB
ALBUMIN SERPL-MCNC: 3.1 G/DL (ref 3.5–5.2)
ALP BLD-CCNC: 75 U/L (ref 40–129)
ALT SERPL-CCNC: 17 U/L (ref 0–40)
ANION GAP SERPL CALCULATED.3IONS-SCNC: 7 MMOL/L (ref 7–16)
AST SERPL-CCNC: 20 U/L (ref 0–39)
BILIRUB SERPL-MCNC: 0.6 MG/DL (ref 0–1.2)
BUN BLDV-MCNC: 22 MG/DL (ref 6–23)
CALCIUM SERPL-MCNC: 8.8 MG/DL (ref 8.6–10.2)
CHLORIDE BLD-SCNC: 98 MMOL/L (ref 98–107)
CO2: 27 MMOL/L (ref 22–29)
CREAT SERPL-MCNC: 1 MG/DL (ref 0.7–1.2)
GFR AFRICAN AMERICAN: >60
GFR NON-AFRICAN AMERICAN: >60 ML/MIN/1.73
GLUCOSE BLD-MCNC: 208 MG/DL (ref 74–99)
HCT VFR BLD CALC: 35.7 % (ref 37–54)
HEMOGLOBIN: 11.3 G/DL (ref 12.5–16.5)
MAGNESIUM: 2.1 MG/DL (ref 1.6–2.6)
MCH RBC QN AUTO: 30.5 PG (ref 26–35)
MCHC RBC AUTO-ENTMCNC: 31.7 % (ref 32–34.5)
MCV RBC AUTO: 96.2 FL (ref 80–99.9)
METER GLUCOSE: 193 MG/DL (ref 74–99)
METER GLUCOSE: 205 MG/DL (ref 74–99)
METER GLUCOSE: 258 MG/DL (ref 74–99)
METER GLUCOSE: 290 MG/DL (ref 74–99)
PDW BLD-RTO: 15.8 FL (ref 11.5–15)
PLATELET # BLD: 158 E9/L (ref 130–450)
PMV BLD AUTO: 11.5 FL (ref 7–12)
POTASSIUM SERPL-SCNC: 4.8 MMOL/L (ref 3.5–5)
RBC # BLD: 3.71 E12/L (ref 3.8–5.8)
SODIUM BLD-SCNC: 132 MMOL/L (ref 132–146)
TOTAL PROTEIN: 5.8 G/DL (ref 6.4–8.3)
WBC # BLD: 14.1 E9/L (ref 4.5–11.5)

## 2021-09-05 PROCEDURE — 36415 COLL VENOUS BLD VENIPUNCTURE: CPT

## 2021-09-05 PROCEDURE — 6370000000 HC RX 637 (ALT 250 FOR IP): Performed by: INTERNAL MEDICINE

## 2021-09-05 PROCEDURE — 2700000000 HC OXYGEN THERAPY PER DAY

## 2021-09-05 PROCEDURE — 36592 COLLECT BLOOD FROM PICC: CPT

## 2021-09-05 PROCEDURE — 6370000000 HC RX 637 (ALT 250 FOR IP): Performed by: NURSE PRACTITIONER

## 2021-09-05 PROCEDURE — 99232 SBSQ HOSP IP/OBS MODERATE 35: CPT | Performed by: INTERNAL MEDICINE

## 2021-09-05 PROCEDURE — 82962 GLUCOSE BLOOD TEST: CPT

## 2021-09-05 PROCEDURE — 2580000003 HC RX 258: Performed by: NURSE PRACTITIONER

## 2021-09-05 PROCEDURE — 6360000002 HC RX W HCPCS: Performed by: NURSE PRACTITIONER

## 2021-09-05 PROCEDURE — 80053 COMPREHEN METABOLIC PANEL: CPT

## 2021-09-05 PROCEDURE — 6360000002 HC RX W HCPCS: Performed by: PHYSICIAN ASSISTANT

## 2021-09-05 PROCEDURE — 2140000000 HC CCU INTERMEDIATE R&B

## 2021-09-05 PROCEDURE — 94640 AIRWAY INHALATION TREATMENT: CPT

## 2021-09-05 PROCEDURE — 93798 PHYS/QHP OP CAR RHAB W/ECG: CPT

## 2021-09-05 PROCEDURE — 85027 COMPLETE CBC AUTOMATED: CPT

## 2021-09-05 PROCEDURE — 83735 ASSAY OF MAGNESIUM: CPT

## 2021-09-05 PROCEDURE — 6370000000 HC RX 637 (ALT 250 FOR IP): Performed by: PHYSICIAN ASSISTANT

## 2021-09-05 RX ORDER — CARVEDILOL 3.12 MG/1
3.12 TABLET ORAL 2 TIMES DAILY WITH MEALS
Status: DISCONTINUED | OUTPATIENT
Start: 2021-09-05 | End: 2021-09-06

## 2021-09-05 RX ORDER — INSULIN GLARGINE 100 [IU]/ML
6 INJECTION, SOLUTION SUBCUTANEOUS ONCE
Status: COMPLETED | OUTPATIENT
Start: 2021-09-05 | End: 2021-09-05

## 2021-09-05 RX ORDER — INSULIN GLARGINE 100 [IU]/ML
36 INJECTION, SOLUTION SUBCUTANEOUS NIGHTLY
Status: DISCONTINUED | OUTPATIENT
Start: 2021-09-06 | End: 2021-09-07 | Stop reason: HOSPADM

## 2021-09-05 RX ORDER — FUROSEMIDE 10 MG/ML
20 INJECTION INTRAMUSCULAR; INTRAVENOUS ONCE
Status: COMPLETED | OUTPATIENT
Start: 2021-09-05 | End: 2021-09-05

## 2021-09-05 RX ADMIN — FERROUS SULFATE TAB 325 MG (65 MG ELEMENTAL FE) 325 MG: 325 (65 FE) TAB at 08:13

## 2021-09-05 RX ADMIN — HEPARIN 300 UNITS: 100 SYRINGE at 05:27

## 2021-09-05 RX ADMIN — FERROUS SULFATE TAB 325 MG (65 MG ELEMENTAL FE) 325 MG: 325 (65 FE) TAB at 17:09

## 2021-09-05 RX ADMIN — SODIUM CHLORIDE, PRESERVATIVE FREE 10 ML: 5 INJECTION INTRAVENOUS at 20:28

## 2021-09-05 RX ADMIN — SODIUM CHLORIDE, PRESERVATIVE FREE 10 ML: 5 INJECTION INTRAVENOUS at 05:27

## 2021-09-05 RX ADMIN — PIPERACILLIN AND TAZOBACTAM 3375 MG: 3; .375 INJECTION, POWDER, LYOPHILIZED, FOR SOLUTION INTRAVENOUS at 17:09

## 2021-09-05 RX ADMIN — Medication 10 ML: at 20:28

## 2021-09-05 RX ADMIN — OXYCODONE HYDROCHLORIDE AND ACETAMINOPHEN 2 TABLET: 5; 325 TABLET ORAL at 19:18

## 2021-09-05 RX ADMIN — INSULIN GLARGINE 30 UNITS: 100 INJECTION, SOLUTION SUBCUTANEOUS at 20:12

## 2021-09-05 RX ADMIN — FUROSEMIDE 20 MG: 20 INJECTION, SOLUTION INTRAMUSCULAR; INTRAVENOUS at 09:59

## 2021-09-05 RX ADMIN — SODIUM CHLORIDE, PRESERVATIVE FREE 10 ML: 5 INJECTION INTRAVENOUS at 01:00

## 2021-09-05 RX ADMIN — AMIODARONE HYDROCHLORIDE 200 MG: 200 TABLET ORAL at 08:13

## 2021-09-05 RX ADMIN — DOCUSATE SODIUM 50 MG AND SENNOSIDES 8.6 MG 1 TABLET: 8.6; 5 TABLET, FILM COATED ORAL at 20:27

## 2021-09-05 RX ADMIN — HEPARIN 300 UNITS: 100 SYRINGE at 20:27

## 2021-09-05 RX ADMIN — PREDNISONE 60 MG: 20 TABLET ORAL at 08:12

## 2021-09-05 RX ADMIN — INSULIN LISPRO 6 UNITS: 100 INJECTION, SOLUTION INTRAVENOUS; SUBCUTANEOUS at 08:21

## 2021-09-05 RX ADMIN — INSULIN LISPRO 6 UNITS: 100 INJECTION, SOLUTION INTRAVENOUS; SUBCUTANEOUS at 11:52

## 2021-09-05 RX ADMIN — BISACODYL 5 MG: 5 TABLET, COATED ORAL at 08:12

## 2021-09-05 RX ADMIN — APIXABAN 5 MG: 5 TABLET, FILM COATED ORAL at 20:27

## 2021-09-05 RX ADMIN — OXYCODONE HYDROCHLORIDE AND ACETAMINOPHEN 2 TABLET: 5; 325 TABLET ORAL at 09:59

## 2021-09-05 RX ADMIN — INSULIN GLARGINE 6 UNITS: 100 INJECTION, SOLUTION SUBCUTANEOUS at 20:39

## 2021-09-05 RX ADMIN — SODIUM CHLORIDE, PRESERVATIVE FREE 10 ML: 5 INJECTION INTRAVENOUS at 05:28

## 2021-09-05 RX ADMIN — AMIODARONE HYDROCHLORIDE 200 MG: 200 TABLET ORAL at 20:27

## 2021-09-05 RX ADMIN — AMIODARONE HYDROCHLORIDE 200 MG: 200 TABLET ORAL at 14:04

## 2021-09-05 RX ADMIN — CARVEDILOL 3.12 MG: 3.12 TABLET, FILM COATED ORAL at 19:10

## 2021-09-05 RX ADMIN — PIPERACILLIN AND TAZOBACTAM 3375 MG: 3; .375 INJECTION, POWDER, LYOPHILIZED, FOR SOLUTION INTRAVENOUS at 01:01

## 2021-09-05 RX ADMIN — INSULIN LISPRO 3 UNITS: 100 INJECTION, SOLUTION INTRAVENOUS; SUBCUTANEOUS at 20:11

## 2021-09-05 RX ADMIN — SODIUM CHLORIDE, PRESERVATIVE FREE 10 ML: 5 INJECTION INTRAVENOUS at 08:14

## 2021-09-05 RX ADMIN — PIPERACILLIN AND TAZOBACTAM 3375 MG: 3; .375 INJECTION, POWDER, LYOPHILIZED, FOR SOLUTION INTRAVENOUS at 08:14

## 2021-09-05 RX ADMIN — APIXABAN 5 MG: 5 TABLET, FILM COATED ORAL at 08:18

## 2021-09-05 RX ADMIN — IPRATROPIUM BROMIDE AND ALBUTEROL SULFATE 1 AMPULE: .5; 2.5 SOLUTION RESPIRATORY (INHALATION) at 19:50

## 2021-09-05 RX ADMIN — MAGNESIUM HYDROXIDE 30 ML: 400 SUSPENSION ORAL at 08:12

## 2021-09-05 RX ADMIN — OXYCODONE HYDROCHLORIDE AND ACETAMINOPHEN 500 MG: 500 TABLET ORAL at 08:13

## 2021-09-05 RX ADMIN — INSULIN LISPRO 4 UNITS: 100 INJECTION, SOLUTION INTRAVENOUS; SUBCUTANEOUS at 11:52

## 2021-09-05 RX ADMIN — DOCUSATE SODIUM 50 MG AND SENNOSIDES 8.6 MG 1 TABLET: 8.6; 5 TABLET, FILM COATED ORAL at 08:13

## 2021-09-05 RX ADMIN — TAMSULOSIN HYDROCHLORIDE 0.4 MG: 0.4 CAPSULE ORAL at 08:12

## 2021-09-05 RX ADMIN — IPRATROPIUM BROMIDE AND ALBUTEROL SULFATE 1 AMPULE: .5; 2.5 SOLUTION RESPIRATORY (INHALATION) at 16:06

## 2021-09-05 RX ADMIN — Medication 400 MG: at 08:12

## 2021-09-05 RX ADMIN — HEPARIN 300 UNITS: 100 SYRINGE at 08:14

## 2021-09-05 RX ADMIN — IPRATROPIUM BROMIDE AND ALBUTEROL SULFATE 1 AMPULE: .5; 2.5 SOLUTION RESPIRATORY (INHALATION) at 12:19

## 2021-09-05 RX ADMIN — INSULIN LISPRO 6 UNITS: 100 INJECTION, SOLUTION INTRAVENOUS; SUBCUTANEOUS at 17:09

## 2021-09-05 RX ADMIN — ATORVASTATIN CALCIUM 40 MG: 80 TABLET, FILM COATED ORAL at 20:11

## 2021-09-05 RX ADMIN — PANTOPRAZOLE SODIUM 40 MG: 40 TABLET, DELAYED RELEASE ORAL at 08:12

## 2021-09-05 RX ADMIN — INSULIN LISPRO 6 UNITS: 100 INJECTION, SOLUTION INTRAVENOUS; SUBCUTANEOUS at 17:11

## 2021-09-05 RX ADMIN — FOLIC ACID 1 MG: 1 TABLET ORAL at 08:12

## 2021-09-05 RX ADMIN — ASPIRIN 81 MG: 81 TABLET ORAL at 08:12

## 2021-09-05 RX ADMIN — INSULIN LISPRO 2 UNITS: 100 INJECTION, SOLUTION INTRAVENOUS; SUBCUTANEOUS at 08:19

## 2021-09-05 RX ADMIN — OXYCODONE HYDROCHLORIDE AND ACETAMINOPHEN 500 MG: 500 TABLET ORAL at 20:27

## 2021-09-05 ASSESSMENT — PAIN SCALES - GENERAL
PAINLEVEL_OUTOF10: 3
PAINLEVEL_OUTOF10: 0
PAINLEVEL_OUTOF10: 5
PAINLEVEL_OUTOF10: 7
PAINLEVEL_OUTOF10: 7
PAINLEVEL_OUTOF10: 0
PAINLEVEL_OUTOF10: 0

## 2021-09-05 ASSESSMENT — PAIN - FUNCTIONAL ASSESSMENT
PAIN_FUNCTIONAL_ASSESSMENT: ACTIVITIES ARE NOT PREVENTED
PAIN_FUNCTIONAL_ASSESSMENT: ACTIVITIES ARE NOT PREVENTED

## 2021-09-05 ASSESSMENT — PAIN DESCRIPTION - ORIENTATION
ORIENTATION: MID
ORIENTATION: MID

## 2021-09-05 ASSESSMENT — PAIN DESCRIPTION - DESCRIPTORS
DESCRIPTORS: SORE;ACHING;DISCOMFORT
DESCRIPTORS: SORE;ACHING;DISCOMFORT

## 2021-09-05 ASSESSMENT — PAIN DESCRIPTION - LOCATION
LOCATION: INCISION;STERNUM
LOCATION: INCISION

## 2021-09-05 ASSESSMENT — PAIN DESCRIPTION - FREQUENCY
FREQUENCY: INTERMITTENT
FREQUENCY: INTERMITTENT

## 2021-09-05 ASSESSMENT — PAIN DESCRIPTION - PAIN TYPE
TYPE: ACUTE PAIN;SURGICAL PAIN
TYPE: ACUTE PAIN;SURGICAL PAIN

## 2021-09-05 ASSESSMENT — PAIN DESCRIPTION - ONSET
ONSET: ON-GOING
ONSET: ON-GOING

## 2021-09-05 ASSESSMENT — PAIN DESCRIPTION - PROGRESSION
CLINICAL_PROGRESSION: NOT CHANGED
CLINICAL_PROGRESSION: NOT CHANGED

## 2021-09-05 NOTE — PROGRESS NOTES
INPATIENT CARDIOLOGY FOLLOW-UP    Name: Diego Ontiveros    Age: 63 y.o. Date of Admission: 8/30/2021  5:21 AM    Date of Service: 9/5/2021    Primary Cardiologist: Known to me    Chief Complaint: Follow-up for CAD, AS, CHF status post CABG/AVR    Interim History:  Out of CVICU  Off of dobutamine as of 9/4/2021  Overall feeling okay. Denies chest pain or shortness of breath. On 2 L nasal cannula. S/p Sternotomy/AVR with 27mm Mane Inspiris bioprosthetic/Mitral valve repair by decalcification of the anterior leaflet of the mitral valve/Reduction aortoplasty/CABG x 2 (LIMA-LAD, SVG-Diag 1)/DERRICK exclusion with 35 mm AtriClip/Rigid internal fixation of the sternum with KLS plates x 22 modifier on 8/30/2021.      Review of Systems:   Negative except as described above    Problem List:  Patient Active Problem List   Diagnosis    Acute systolic congestive heart failure (HCC)    Class 2 severe obesity due to excess calories with serious comorbidity in adult [facility])    Uncontrolled type 2 diabetes mellitus with hyperglycemia ([facility] [address] [facility]    Essential hypertension    Severe aortic stenosis    [facility] systolic [facility]est[facility] failure) ([facility]    Heart failure ([facility] [address] [facility]    CAD in native artery    COVID-19    Acute pulmonary insufficiency       Current Medications:    Current Facility-Administered Medications:     insulin lispro (HUMALOG) injection vial 6 Units, 6 Units, SubCUTAneous, TID , Mikhail Linn MD, 6 Units at 09/05/21 0640    apixaban (ELIQUIS) tablet 5 mg, 5 mg, Oral, BID, Ariadne Benavides PA-C, 5 mg at 09/05/21 0818    perflutren lipid microspheres (DEFINITY) injection 1.65 mg, 1.5 mL, IntraVENous, ONCE PRN, Ariadne Benavides PA-C    carvedilol (COREG) tablet 3.125 mg, 3.125 mg, Oral, BID WC, Ariadne Benavides PA-C    insulin glargine (LANTUS) injection vial 30 Units, 30 Units, SubCUTAneous, Nightly, Ariadne Benavides PA-C, 30 Units at 09/04/21 2204    insulin lispro (HUMALOG) injection vial 0-12 Units, 0-12 Units, SubCUTAneous, TID WC, Mikhail Goldberg MD, 2 Units at 09/05/21 0819    insulin lispro (HUMALOG) injection vial 0-6 Units, 0-6 Units, SubCUTAneous, Nightly, Timothy Bernstein MD, 1 Units at 09/04/21 2203    aspirin EC tablet 81 mg, 81 mg, Oral, Daily, Josy Quick, APRN - CNP, 81 mg at 09/05/21 8008    acetaminophen (TYLENOL) tablet 650 mg, 650 mg, Oral, Q4H PRN, Josy Quick, APRN - CNP    oxyCODONE-acetaminophen (PERCOCET) 5-325 MG per tablet 1 tablet, 1 tablet, Oral, Q4H PRN, 1 tablet at 09/03/21 1353 **OR** oxyCODONE-acetaminophen (PERCOCET) 5-325 MG per tablet 2 tablet, 2 tablet, Oral, Q4H PRN, Josy Quick, APRN - CNP, 2 tablet at 09/05/21 0959    bisacodyl (DULCOLAX) EC tablet 5 mg, 5 mg, Oral, Daily, Josy Quick, APRN - CNP, 5 mg at 09/05/21 5012    sennosides-docusate sodium (SENOKOT-S) 8.6-50 MG tablet 1 tablet, 1 tablet, Oral, BID, Josy Quick, APRN - CNP, 1 tablet at 09/05/21 0813    magnesium hydroxide (MILK OF MAGNESIA) 400 MG/5ML suspension 30 mL, 30 mL, Oral, Daily, Josy Quick, APRN - CNP, 30 mL at 09/05/21 7275    ferrous sulfate (IRON 325) tablet 325 mg, 325 mg, Oral, BID WC, Josy Quick, APRN - CNP, 325 mg at 09/05/21 0813    ascorbic acid (VITAMIN C) tablet 500 mg, 500 mg, Oral, BID, Josy Quick, APRN - CNP, 500 mg at 25/07/63 5959    folic acid (FOLVITE) tablet 1 mg, 1 mg, Oral, Daily, Josy Quick, APRN - CNP, 1 mg at 09/05/21 8177    potassium chloride (KLOR-CON M) extended release tablet 20 mEq, 20 mEq, Oral, PRN, Josy Quick, APRN - CNP    bisacodyl (DULCOLAX) suppository 10 mg, 10 mg, Rectal, Daily PRN, Josy Quick, APRN - CNP, 10 mg at 09/04/21 1714    pantoprazole (PROTONIX) tablet 40 mg, 40 mg, Oral, Daily, Josy Quick, APRN - CNP, 40 mg at 09/05/21 0166    sodium chloride (OCEAN, BABY AYR) 0.65 % nasal spray 1 spray, 1 spray, Each Nostril, PRN, Josy Quick, APRN - CNP    trimethobenzamide Sandee Silva) injection 200 mg, 200 mg, IntraMUSCular, Q6H PRN, Sarah Loua, APRN - CNP    morphine (PF) injection 2 mg, 2 mg, IntraVENous, Q4H PRN, Chris Boo, APRN - CNP    sodium chloride flush 0.9 % injection 5-40 mL, 5-40 mL, IntraVENous, 2 times per day, Sarah Carminea, APRN - CNP, 10 mL at 09/04/21 2210    sodium chloride flush 0.9 % injection 5-40 mL, 5-40 mL, IntraVENous, PRN, Sarah Pupa, APRN - CNP    heparin flush 100 UNIT/ML injection 300 Units, 3 mL, IntraVENous, 2 times per day, Sarah Pupa, APRN - CNP, 300 Units at 09/05/21 0814    heparin flush 100 UNIT/ML injection 300 Units, 3 mL, IntraCATHeter, PRN, Sarah Pupa, APRN - CNP, 300 Units at 09/05/21 0527    piperacillin-tazobactam (ZOSYN) 3,375 mg in dextrose 5 % 100 mL IVPB extended infusion (mini-bag), 3,375 mg, IntraVENous, Q8H, Sarah Loua, APRN - CNP, Last Rate: 25 mL/hr at 09/05/21 0814, 3,375 mg at 09/05/21 0814    amiodarone (CORDARONE) tablet 200 mg, 200 mg, Oral, TID, Sarah Pupa, APRN - CNP, 200 mg at 09/05/21 0813    atorvastatin (LIPITOR) tablet 40 mg, 40 mg, Oral, Nightly, Sarah Pupa, APRN - CNP, 40 mg at 09/04/21 2158    sodium chloride flush 0.9 % injection 10 mL, 10 mL, IntraVENous, 2 times per day, Sarah Carminea, APRN - CNP, 10 mL at 09/05/21 0814    sodium chloride flush 0.9 % injection 10 mL, 10 mL, IntraVENous, PRN, Sarah Pupa, APRN - CNP, 10 mL at 09/05/21 0528    magnesium oxide (MAG-OX) tablet 400 mg, 400 mg, Oral, Daily, Sarah Pupa, APRN - CNP, 400 mg at 09/05/21 8716    ipratropium-albuterol (DUONEB) nebulizer solution 1 ampule, 1 ampule, Inhalation, Q4H WA, Sarah Bolaños, APRN - CNP, 1 ampule at 09/04/21 1535    glucose (GLUTOSE) 40 % oral gel 15 g, 15 g, Oral, PRN, Sarah Bolaños, APRN - CNP    dextrose 50 % IV solution, 12.5 g, IntraVENous, PRN, Sarah Bolaños, APRN - CNP, 12.5 g at 08/31/21 0909    glucagon (rDNA) injection 1 mg, 1 mg, IntraMUSCular, PRN, Anuja Manus 21  0635   WBC 15.5* 13.0* 14.1*   RBC 3.83 3.81 3.71*   HGB 11.6* 11.7* 11.3*   HCT 36.3* 36.8* 35.7*   MCV 94.8 96.6 96.2   MCH 30.3 30.7 30.5   MCHC 32.0 31.8* 31.7*   RDW 16.0* 15.8* 15.8*   * 129* 158   MPV 11.4 11.5 11.5     No results found for: CKTOTAL, CKMB, CKMBINDEX, TROPONINI  Lab Results   Component Value Date    INR 1.8 2021    INR 1.3 2021    PROTIME 19.2 (H) 2021    PROTIME 14.5 (H) 2021     No results found for: TSHFT4, TSH  Lab Results   Component Value Date    LABA1C 8.0 (H) 2021     No results found for: EAG  Lab Results   Component Value Date    CHOL 124 2021     Lab Results   Component Value Date    TRIG 70 2021     Lab Results   Component Value Date    HDL 33 2021    HDL 35 2021     Lab Results   Component Value Date    LDLCALC 87 2021    LDLCALC 75 2021     Lab Results   Component Value Date    LABVLDL 22 2021    LABVLDL 14 2021     No results found for: CHOLHDLRATIO  No results for input(s): PROBNP in the last 72 hours. Cardiac Tests:    EK/3/2021 atrial fibrillation 92 bpm.  Left axis deviation. IVCD QRS duration 130 ms.    2021: Sinus rhythm 98 bpm.  Left axis. IVCD QRS duration 128 ms. Telemetry: Atrial fibrillation 90s, PVCs    Chest X-ray:   9/3/2021       Impression   Right IJ catheter is removed.  Mild to moderate pulmonary vascular congestion. Echocardiogram:   GABRIELE 2021   Summary   Ejection fraction is visually estimated at 20-25%. The aortic valve is bicuspid, with fusion of the right and left coronary   cusps. Probably severe low-flow low-gradient aortic stenosis is present. Mean gradient 30 mmHg. YESI 0.8 cm2. YESI by planimetry 1.2 cm2. Ascending aorta and root are dilated. TTE 6/15/2021   Summary   Left ventricle is moderately enlarged . Ejection fraction is visually estimated at 15-20%. Overall ejection fraction severely decreased .    Severe global hypokinesis. Normal left ventricle wall thickness. Stage III diastolic dysfunction. Moderately dilated right ventricle. Right ventricle global systolic function is low normal . TAPSE 17 mm. There is severe low flow, low gradient aortic stenosis with valve area of   0.8 sq cm by continuity equation. Aortic valve peak velocity 2.8 m/s, mean   gradient 19 mmHg, DVI 0.22, SVI 15 ml/m2. Mild tricuspid regurgitation. RVSP is 58 mmHg. Pulmonary hypertension is moderate . No evidence for hemodynamically significant pericardial effusion. Stress test:    Dobutamine stress echo 6/21/2021     Summary   Dobutamine stress echocardiogram for assessment of low flow, low gradient   aortic stenosis. True severe aortic stenosis is demonstrated. The aortic valve area is 0.7 cm2 with a maximum gradient of 72 mmHg and a   mean gradient of 46 mmHg. The YESI decreased from 0.7 cm2 to 0.6 cm2 at peak. The dimensionless index   remained less than 0.25 at all levels. The aortic valve peak velocity, peak gradient and mean gradient are all in   the severe range. There is severe global hypokinesis noted. All segments suggest viability. The contractile reserve is 50% suggested benefit to prognostication of   outcome with aortic valve intervention. Cardiac catheterization:   Left heart cath 6/21/2021    CONCLUSIONS:  1. Coronary artery disease:  A. Left main. No significant disease. B.  LAD. Eccentric 80% to 90% very proximal vessel narrowing and 70% to  80% mid vessel disease after a moderate size diagonal branch with  myocardial bridging at the site. C. Intermediate ramus. Relatively large vessel with 95% proximal  vessel subtotal occlusion. D.  LCX. Large but nondominant vessel with mild luminal irregularities  and with 70% to 80% ostial narrowing of a small to moderate size  marginal branch. E.  RCA.   Dominant vessel with around 30% to 40% proximal and mid and  distal vessel disease, and around 40% disease of the large posterior  descending artery branch.    ----------------------------------------------------------------------------------------------------------------------------------------------------------------  IMPRESSION:  1. Coronary artery disease status post CABG (LIMA-LAD, V-D1) 8/30/2021  2. Bicuspid aortic valve with severe AS s/p AVR with 27 mm Mane Inspiris bioprosthetic  3. MR status post mitral repair with decalcification of anterior leaflet  4. Aortic aneurysm status post reduction aortoplasty  5. DERRICK occlusion 35 mm atrial clip  6. Acute on chronic HFrEF/cardiogenic shock improving  7. Cardiomyopathy ischemic, valvular. EF 20% preoperatively  8. Postoperative paroxysmal atrial fibrillation, rate controlled  9. Postoperative hypoxemia  10. Borderline hyperkalemia  11. Anemia Hgb 11.7  12. Type 2 diabetes, A1c 8.0% poorly controlled  13. COVID-19 infection 11/2020  14. Obesity BMI 36.5 kg/m²  15. BPH    RECOMMENDATIONS:  Continues to improve. Now off dobutamine.  Initiate GDMT recommend metoprolol succinate and sacubitril/valsartan when able to hemodynamically tolerate   Avoid spironolactone due to hyperkalemia   Recommend SGLT2 inhibitor   WCD on discharge if EF less than 35%   Continue amiodarone   Apixaban started today   Can arrange outpatient cardioversion if he remains in atrial fibrillation at the time of office follow-up   Continue aspirin, statin   Aggressive risk factor modification   Likely to rehab early this coming week   Further care per CTS   Will be available as needed please call with questions, and I will follow up with him in the office    Roberth Dumont MD, [facility]    NOTE: This report was transcribed using voice recognition software. Every effort was made to ensure accuracy; however, inadvertent computerized transcription errors may be present.

## 2021-09-05 NOTE — PLAN OF CARE
Problem: Anxiety:  Goal: Level of anxiety will decrease  Description: Level of anxiety will decrease  Outcome: Met This Shift     Problem: Fluid Volume - Imbalance:  Goal: Ability to achieve a balanced intake and output will improve  Description: Ability to achieve a balanced intake and output will improve  9/4/2021 1632 by Yanique Cerda RN  Outcome: Met This Shift  Goal: Chest tube drainage is within specified parameters  Description: Chest tube drainage is within specified parameters  9/4/2021 1632 by Yanique Cerda RN  Outcome: Met This Shift     Problem: Gas Exchange - Impaired:  Goal: Levels of oxygenation will improve  Description: Levels of oxygenation will improve  9/4/2021 1632 by Yanique Cerda RN  Outcome: Met This Shift  Goal: Ability to maintain adequate ventilation will improve  Description: Ability to maintain adequate ventilation will improve  9/4/2021 1632 by Yanique Cerda RN  Outcome: Met This Shift

## 2021-09-05 NOTE — PLAN OF CARE
Problem: Fluid Volume - Imbalance:  Goal: Ability to achieve a balanced intake and output will improve  Description: Ability to achieve a balanced intake and output will improve  Outcome: Met This Shift     Problem: Fluid Volume - Imbalance:  Goal: Chest tube drainage is within specified parameters  Description: Chest tube drainage is within specified parameters  Outcome: Met This Shift     Problem: Gas Exchange - Impaired:  Goal: Levels of oxygenation will improve  Description: Levels of oxygenation will improve  Outcome: Met This Shift     Problem: Gas Exchange - Impaired:  Goal: Ability to maintain adequate ventilation will improve  Description: Ability to maintain adequate ventilation will improve  Outcome: Met This Shift

## 2021-09-05 NOTE — PLAN OF CARE
Problem: Pain:  Goal: Pain level will decrease  Description: Pain level will decrease  Outcome: Met This Shift     Problem: Pain:  Goal: Control of acute pain  Description: Control of acute pain  Outcome: Met This Shift     Problem: Pain:  Goal: Control of chronic pain  Description: Control of chronic pain  Outcome: Met This Shift     Problem: Tissue Perfusion - Cardiopulmonary, Altered:  Goal: Absence of angina  Description: Absence of angina  Outcome: Met This Shift     Problem: Skin Integrity:  Goal: Will show no infection signs and symptoms  Description: Will show no infection signs and symptoms  Outcome: Met This Shift     Problem: Skin Integrity:  Goal: Absence of new skin breakdown  Description: Absence of new skin breakdown  Outcome: Met This Shift     Problem: Pain:  Goal: Pain level will decrease  Description: Pain level will decrease  Outcome: Met This Shift

## 2021-09-05 NOTE — PROGRESS NOTES
POD 6  Awake, alert. No complaints. Denies CP, palpitations, SOB at rest, dizziness/lightheadedness. Vitals:    09/05/21 0334 09/05/21 0523 09/05/21 0656 09/05/21 0717   BP: 138/88  129/85    Pulse: 75  73    Resp: 18  18    Temp: 96.5 °F (35.8 °C)  97.3 °F (36.3 °C)    TempSrc: Temporal  Temporal    SpO2: 96%  96% 93%   Weight:  294 lb 6.4 oz (133.5 kg)     Height:         O2: 2L/NC      Intake/Output Summary (Last 24 hours) at 9/5/2021 0812  Last data filed at 9/5/2021 0656  Gross per 24 hour   Intake 840 ml   Output 700 ml   Net 140 ml         +BM    Recent Labs     09/03/21  0140 09/04/21  0455 09/05/21  0635   WBC 15.5* 13.0* 14.1*   HGB 11.6* 11.7* 11.3*   HCT 36.3* 36.8* 35.7*   * 129* 158      Recent Labs     09/04/21  0455 09/04/21  1400 09/05/21  0635   BUN 25* 22 22   CREATININE 1.0 1.0 1.0           Telemetry: Afib       PE  Cardiac: IRRR  Lungs: decreased bases  Chest incision with intact JONE DSD. Sternum stable. Prior chest tube site incisions C/D/I, no erythema with intact sutures. Epicardial pacing wires present and secure. Abd: Soft, nontender, +BS  Ext: Incisions C/D/I, approximated, no erythema, + edema           A/P: POD# 6     1. CAD, AS, ischemic cardiomyopathy   --Stable s/p AVR, MVrp, CABG, DERRICK exclusion   --Post op GABRIELE reveals 20%  EF- on inotropes    --will order TTE prior to discharge now off inotrope. Ordered 9/5. ( Life vest ordered 9/3)  --Scr stable- Scr 1.0  -- ASA/ statin - plan to start BB once off inotropic support, if BP remains stable, start coreg this evening   --reinforce sternal precautions  --continue epicardial pacing wires  -- All CTs out   -- IV Solu-cortef transitioned to PO, cont to taper      2. Expected acute blood loss anemia secondary to open heart surgery  --stable  -- hgb 11.3        3. Afib   --Cont PO amio  -- Dobut off last evening, monitor off today, likely start BB tomm   -- Eliquis started today         4.  Expected acute respiratory insufficiency in the setting following surgery  --wean oxygen to keep SpO2 greater than or equal to 92%  --continue duonebs with ezpap  --encourage C&DB, SMI  --currently on 2L O2/NC  -- Empiric zosyn Day 5  -- Lasix today   -- check CXR tomm      5.  Acute on chronic systolic heart failure  - ICM; EF ~20%  -DOS 0.09units/min vasopressin, 16mcq/min epinephrine and 5 mcq/kg/min dobutamine  -POD1- hemodynamics much improved, lactic downtrending, creatinine and lfts NML-- down to 0.05 units vasopressin, 10mcq/min epinephrine, dobutamine up to 7.5mcq. CI 2.2, CO 5.6, SVR 1278, mixed venous 83  -POD2-shock resolved, off vasopressin, epinephrine, remains on dobutamine  -POD3-Dobutamine dropped to 2mcq, plan to slowly wean off then introduce heart failure meds   - POD4 Dobut decreased to 1mcq, plan to cont slowly wean, then introduce GDMT   - POD 5 Dobut dec to 0.5 mg, if hemodynamics remain stable this evening, will shut off - plan to introduce GDMT once off inotropes   POD 6 Dobut off last evening, monitor off today, start coreg 3.125 later tonight of BP remains stable,  add other GDMT as BP allows         6. Constipation--expected delayed return of bowel function  --secondary to anesthesia, narcotics, decreased oral intake, and decreased physical activity   --resolved   --Encouraged continued increase in oral intake and activity.      7. Uncontrolled DMII  - Hemoglobin A1c 8.0  - Endocrine consulted- appreciate input  - Monitor closely while on steroids   - Cont lantus and SSI          6. Expected deconditioning in the setting following surgery  --Increase activity as tolerated  --PT/OT                DVT prophylaxis:  --continue bilateral knee high LILIANA hose  --continue PCDs  --continue progressive ambulation  -- Eliquis started 9/5      Dispo:Discussed with LILIANA, plan is to rehab at [facility], likely early next week          This patient's case and care plan was discussed with the attending surgeon

## 2021-09-06 ENCOUNTER — APPOINTMENT (OUTPATIENT)
Dept: GENERAL RADIOLOGY | Age: 61
DRG: 163 | End: 2021-09-06
Attending: THORACIC SURGERY (CARDIOTHORACIC VASCULAR SURGERY)
Payer: COMMERCIAL

## 2021-09-06 LAB
ALBUMIN SERPL-MCNC: 3.2 G/DL (ref 3.5–5.2)
ALP BLD-CCNC: 72 U/L (ref 40–129)
ALT SERPL-CCNC: 18 U/L (ref 0–40)
ANION GAP SERPL CALCULATED.3IONS-SCNC: 7 MMOL/L (ref 7–16)
AST SERPL-CCNC: 18 U/L (ref 0–39)
BILIRUB SERPL-MCNC: 0.6 MG/DL (ref 0–1.2)
BUN BLDV-MCNC: 25 MG/DL (ref 6–23)
CALCIUM SERPL-MCNC: 8.9 MG/DL (ref 8.6–10.2)
CHLORIDE BLD-SCNC: 96 MMOL/L (ref 98–107)
CO2: 28 MMOL/L (ref 22–29)
CREAT SERPL-MCNC: 1 MG/DL (ref 0.7–1.2)
GFR AFRICAN AMERICAN: >60
GFR NON-AFRICAN AMERICAN: >60 ML/MIN/1.73
GLUCOSE BLD-MCNC: 242 MG/DL (ref 74–99)
HCT VFR BLD CALC: 35.4 % (ref 37–54)
HEMOGLOBIN: 11.4 G/DL (ref 12.5–16.5)
MAGNESIUM: 2 MG/DL (ref 1.6–2.6)
MCH RBC QN AUTO: 31 PG (ref 26–35)
MCHC RBC AUTO-ENTMCNC: 32.2 % (ref 32–34.5)
MCV RBC AUTO: 96.2 FL (ref 80–99.9)
METER GLUCOSE: 133 MG/DL (ref 74–99)
METER GLUCOSE: 213 MG/DL (ref 74–99)
METER GLUCOSE: 80 MG/DL (ref 74–99)
PDW BLD-RTO: 15.7 FL (ref 11.5–15)
PLATELET # BLD: 176 E9/L (ref 130–450)
PMV BLD AUTO: 11.6 FL (ref 7–12)
POTASSIUM SERPL-SCNC: 4.4 MMOL/L (ref 3.5–5)
RBC # BLD: 3.68 E12/L (ref 3.8–5.8)
SODIUM BLD-SCNC: 131 MMOL/L (ref 132–146)
TOTAL PROTEIN: 5.9 G/DL (ref 6.4–8.3)
WBC # BLD: 14.7 E9/L (ref 4.5–11.5)

## 2021-09-06 PROCEDURE — 6370000000 HC RX 637 (ALT 250 FOR IP): Performed by: INTERNAL MEDICINE

## 2021-09-06 PROCEDURE — 6370000000 HC RX 637 (ALT 250 FOR IP): Performed by: NURSE PRACTITIONER

## 2021-09-06 PROCEDURE — 80053 COMPREHEN METABOLIC PANEL: CPT

## 2021-09-06 PROCEDURE — 97530 THERAPEUTIC ACTIVITIES: CPT

## 2021-09-06 PROCEDURE — 99232 SBSQ HOSP IP/OBS MODERATE 35: CPT | Performed by: INTERNAL MEDICINE

## 2021-09-06 PROCEDURE — 6370000000 HC RX 637 (ALT 250 FOR IP): Performed by: PHYSICIAN ASSISTANT

## 2021-09-06 PROCEDURE — 6360000002 HC RX W HCPCS: Performed by: NURSE PRACTITIONER

## 2021-09-06 PROCEDURE — 85027 COMPLETE CBC AUTOMATED: CPT

## 2021-09-06 PROCEDURE — 83735 ASSAY OF MAGNESIUM: CPT

## 2021-09-06 PROCEDURE — 94640 AIRWAY INHALATION TREATMENT: CPT

## 2021-09-06 PROCEDURE — 36415 COLL VENOUS BLD VENIPUNCTURE: CPT

## 2021-09-06 PROCEDURE — 93798 PHYS/QHP OP CAR RHAB W/ECG: CPT

## 2021-09-06 PROCEDURE — 36592 COLLECT BLOOD FROM PICC: CPT

## 2021-09-06 PROCEDURE — 2580000003 HC RX 258: Performed by: NURSE PRACTITIONER

## 2021-09-06 PROCEDURE — 97535 SELF CARE MNGMENT TRAINING: CPT

## 2021-09-06 PROCEDURE — 6360000002 HC RX W HCPCS: Performed by: PHYSICIAN ASSISTANT

## 2021-09-06 PROCEDURE — 82962 GLUCOSE BLOOD TEST: CPT

## 2021-09-06 PROCEDURE — 71045 X-RAY EXAM CHEST 1 VIEW: CPT

## 2021-09-06 PROCEDURE — 2140000000 HC CCU INTERMEDIATE R&B

## 2021-09-06 RX ORDER — FUROSEMIDE 10 MG/ML
20 INJECTION INTRAMUSCULAR; INTRAVENOUS ONCE
Status: COMPLETED | OUTPATIENT
Start: 2021-09-06 | End: 2021-09-06

## 2021-09-06 RX ORDER — PREDNISONE 20 MG/1
40 TABLET ORAL DAILY
Status: COMPLETED | OUTPATIENT
Start: 2021-09-06 | End: 2021-09-07

## 2021-09-06 RX ORDER — METOPROLOL SUCCINATE 25 MG/1
25 TABLET, EXTENDED RELEASE ORAL DAILY
Status: DISCONTINUED | OUTPATIENT
Start: 2021-09-06 | End: 2021-09-06

## 2021-09-06 RX ORDER — METOPROLOL SUCCINATE 25 MG/1
12.5 TABLET, EXTENDED RELEASE ORAL DAILY
Status: DISCONTINUED | OUTPATIENT
Start: 2021-09-06 | End: 2021-09-07 | Stop reason: HOSPADM

## 2021-09-06 RX ADMIN — DOCUSATE SODIUM 50 MG AND SENNOSIDES 8.6 MG 1 TABLET: 8.6; 5 TABLET, FILM COATED ORAL at 08:37

## 2021-09-06 RX ADMIN — INSULIN GLARGINE 36 UNITS: 100 INJECTION, SOLUTION SUBCUTANEOUS at 20:15

## 2021-09-06 RX ADMIN — PREDNISONE 40 MG: 20 TABLET ORAL at 12:12

## 2021-09-06 RX ADMIN — OXYCODONE HYDROCHLORIDE AND ACETAMINOPHEN 500 MG: 500 TABLET ORAL at 20:14

## 2021-09-06 RX ADMIN — SODIUM CHLORIDE, PRESERVATIVE FREE 10 ML: 5 INJECTION INTRAVENOUS at 04:42

## 2021-09-06 RX ADMIN — OXYCODONE HYDROCHLORIDE AND ACETAMINOPHEN 500 MG: 500 TABLET ORAL at 08:37

## 2021-09-06 RX ADMIN — OXYCODONE HYDROCHLORIDE AND ACETAMINOPHEN 2 TABLET: 5; 325 TABLET ORAL at 14:04

## 2021-09-06 RX ADMIN — Medication 400 MG: at 08:37

## 2021-09-06 RX ADMIN — PIPERACILLIN AND TAZOBACTAM 3375 MG: 3; .375 INJECTION, POWDER, LYOPHILIZED, FOR SOLUTION INTRAVENOUS at 17:23

## 2021-09-06 RX ADMIN — IPRATROPIUM BROMIDE AND ALBUTEROL SULFATE 1 AMPULE: .5; 2.5 SOLUTION RESPIRATORY (INHALATION) at 16:30

## 2021-09-06 RX ADMIN — SODIUM CHLORIDE, PRESERVATIVE FREE 10 ML: 5 INJECTION INTRAVENOUS at 08:38

## 2021-09-06 RX ADMIN — AMIODARONE HYDROCHLORIDE 200 MG: 200 TABLET ORAL at 20:14

## 2021-09-06 RX ADMIN — MAGNESIUM HYDROXIDE 30 ML: 400 SUSPENSION ORAL at 08:37

## 2021-09-06 RX ADMIN — SODIUM CHLORIDE, PRESERVATIVE FREE 10 ML: 5 INJECTION INTRAVENOUS at 04:43

## 2021-09-06 RX ADMIN — FOLIC ACID 1 MG: 1 TABLET ORAL at 08:37

## 2021-09-06 RX ADMIN — FUROSEMIDE 20 MG: 20 INJECTION, SOLUTION INTRAMUSCULAR; INTRAVENOUS at 08:38

## 2021-09-06 RX ADMIN — HEPARIN 300 UNITS: 100 SYRINGE at 04:43

## 2021-09-06 RX ADMIN — PIPERACILLIN AND TAZOBACTAM 3375 MG: 3; .375 INJECTION, POWDER, LYOPHILIZED, FOR SOLUTION INTRAVENOUS at 00:31

## 2021-09-06 RX ADMIN — AMIODARONE HYDROCHLORIDE 200 MG: 200 TABLET ORAL at 14:06

## 2021-09-06 RX ADMIN — PANTOPRAZOLE SODIUM 40 MG: 40 TABLET, DELAYED RELEASE ORAL at 08:37

## 2021-09-06 RX ADMIN — SODIUM CHLORIDE, PRESERVATIVE FREE 10 ML: 5 INJECTION INTRAVENOUS at 17:23

## 2021-09-06 RX ADMIN — IPRATROPIUM BROMIDE AND ALBUTEROL SULFATE 1 AMPULE: .5; 2.5 SOLUTION RESPIRATORY (INHALATION) at 11:39

## 2021-09-06 RX ADMIN — OXYCODONE HYDROCHLORIDE AND ACETAMINOPHEN 2 TABLET: 5; 325 TABLET ORAL at 21:38

## 2021-09-06 RX ADMIN — TAMSULOSIN HYDROCHLORIDE 0.4 MG: 0.4 CAPSULE ORAL at 08:37

## 2021-09-06 RX ADMIN — PIPERACILLIN AND TAZOBACTAM 3375 MG: 3; .375 INJECTION, POWDER, LYOPHILIZED, FOR SOLUTION INTRAVENOUS at 08:38

## 2021-09-06 RX ADMIN — ATORVASTATIN CALCIUM 40 MG: 80 TABLET, FILM COATED ORAL at 20:14

## 2021-09-06 RX ADMIN — Medication 10 ML: at 20:15

## 2021-09-06 RX ADMIN — METOPROLOL SUCCINATE 12.5 MG: 25 TABLET, EXTENDED RELEASE ORAL at 08:38

## 2021-09-06 RX ADMIN — INSULIN LISPRO 12 UNITS: 100 INJECTION, SOLUTION INTRAVENOUS; SUBCUTANEOUS at 12:07

## 2021-09-06 RX ADMIN — FERROUS SULFATE TAB 325 MG (65 MG ELEMENTAL FE) 325 MG: 325 (65 FE) TAB at 17:23

## 2021-09-06 RX ADMIN — SODIUM CHLORIDE, PRESERVATIVE FREE 10 ML: 5 INJECTION INTRAVENOUS at 00:31

## 2021-09-06 RX ADMIN — INSULIN LISPRO 12 UNITS: 100 INJECTION, SOLUTION INTRAVENOUS; SUBCUTANEOUS at 08:44

## 2021-09-06 RX ADMIN — OXYCODONE HYDROCHLORIDE AND ACETAMINOPHEN 2 TABLET: 5; 325 TABLET ORAL at 08:45

## 2021-09-06 RX ADMIN — HEPARIN 300 UNITS: 100 SYRINGE at 08:37

## 2021-09-06 RX ADMIN — INSULIN LISPRO 6 UNITS: 100 INJECTION, SOLUTION INTRAVENOUS; SUBCUTANEOUS at 08:41

## 2021-09-06 RX ADMIN — INSULIN LISPRO 3 UNITS: 100 INJECTION, SOLUTION INTRAVENOUS; SUBCUTANEOUS at 20:15

## 2021-09-06 RX ADMIN — FERROUS SULFATE TAB 325 MG (65 MG ELEMENTAL FE) 325 MG: 325 (65 FE) TAB at 08:38

## 2021-09-06 RX ADMIN — APIXABAN 5 MG: 5 TABLET, FILM COATED ORAL at 20:14

## 2021-09-06 RX ADMIN — AMIODARONE HYDROCHLORIDE 200 MG: 200 TABLET ORAL at 08:37

## 2021-09-06 RX ADMIN — APIXABAN 5 MG: 5 TABLET, FILM COATED ORAL at 08:37

## 2021-09-06 RX ADMIN — ASPIRIN 81 MG: 81 TABLET ORAL at 08:36

## 2021-09-06 RX ADMIN — HEPARIN 300 UNITS: 100 SYRINGE at 20:14

## 2021-09-06 ASSESSMENT — PAIN SCALES - GENERAL
PAINLEVEL_OUTOF10: 0
PAINLEVEL_OUTOF10: 9
PAINLEVEL_OUTOF10: 0
PAINLEVEL_OUTOF10: 7
PAINLEVEL_OUTOF10: 0
PAINLEVEL_OUTOF10: 7
PAINLEVEL_OUTOF10: 4
PAINLEVEL_OUTOF10: 0
PAINLEVEL_OUTOF10: 2
PAINLEVEL_OUTOF10: 0

## 2021-09-06 ASSESSMENT — PAIN DESCRIPTION - PAIN TYPE
TYPE: SURGICAL PAIN

## 2021-09-06 ASSESSMENT — PAIN DESCRIPTION - LOCATION
LOCATION: STERNUM
LOCATION: STERNUM
LOCATION: BACK

## 2021-09-06 ASSESSMENT — PAIN DESCRIPTION - DESCRIPTORS
DESCRIPTORS: ACHING;DISCOMFORT;SORE

## 2021-09-06 ASSESSMENT — PAIN DESCRIPTION - ORIENTATION
ORIENTATION: MID
ORIENTATION: LOWER
ORIENTATION: MID

## 2021-09-06 ASSESSMENT — PAIN DESCRIPTION - FREQUENCY: FREQUENCY: INTERMITTENT

## 2021-09-06 NOTE — PROGRESS NOTES
Endocrinology messaged regarding recent BS-80. Patient's BS-80. Do you want me to hold the straight Humalog order of 12 units for dinner?    0'\"   Read 4:33 PM   0'\"   9/6/21 4:33 PM   Yes, please hold humalog

## 2021-09-06 NOTE — PROGRESS NOTES
Per endocrinology, please give extra 6 of Lantus tonight to equal new dose of 36 units. Mika Ndiaye RN

## 2021-09-06 NOTE — PLAN OF CARE
Problem: Discharge Planning:  Goal: Discharged to appropriate level of care  Description: Discharged to appropriate level of care  Outcome: Met This Shift     Problem:  Activity Intolerance:  Goal: Able to perform prescribed physical activity  Description: Able to perform prescribed physical activity  Outcome: Met This Shift     Problem: Anxiety:  Goal: Level of anxiety will decrease  Description: Level of anxiety will decrease  Outcome: Met This Shift     Problem: Fluid Volume - Imbalance:  Goal: Ability to achieve a balanced intake and output will improve  Description: Ability to achieve a balanced intake and output will improve  9/5/2021 1733 by Cathy Barrientos RN  Outcome: Met This Shift  Goal: Chest tube drainage is within specified parameters  Description: Chest tube drainage is within specified parameters  9/5/2021 1733 by Cathy Barrientos RN  Outcome: Met This Shift     Problem: Pain:  Goal: Pain level will decrease  Description: Pain level will decrease  9/5/2021 2003 by Mika Ndiaye RN  Outcome: Met This Shift  9/5/2021 1733 by Cathy Barrientos RN  Outcome: Met This Shift  9/5/2021 0744 by Cathy Barrientos RN  Outcome: Met This Shift  Goal: Control of acute pain  Description: Control of acute pain  9/5/2021 1733 by Cathy Barrientos RN  Outcome: Met This Shift  9/5/2021 0744 by Cathy Barrientos RN  Outcome: Met This Shift  Goal: Control of chronic pain  Description: Control of chronic pain  9/5/2021 1733 by Cathy Barrientos RN  Outcome: Met This Shift  9/5/2021 0744 by Cathy Barrientos RN  Outcome: Met This Shift     Problem: Tissue Perfusion - Cardiopulmonary, Altered:  Goal: Absence of angina  Description: Absence of angina  9/5/2021 0744 by Cathy Barrientos RN  Outcome: Met This Shift

## 2021-09-06 NOTE — PROGRESS NOTES
POD# 7  Awake, alert. No complaints. Denies CP, palpitations, SOB at rest, dizziness/lightheadedness. Vitals:    09/06/21 0030 09/06/21 0330 09/06/21 0710 09/06/21 0715   BP:  127/86 (!) 134/92    Pulse:  72 69    Resp:  18 20    Temp:  97.1 °F (36.2 °C) 97 °F (36.1 °C)    TempSrc:  Temporal Temporal    SpO2: 95% 94% 93%    Weight:    294 lb 6.4 oz (133.5 kg)   Height:         RA      Intake/Output Summary (Last 24 hours) at 9/6/2021 0727  Last data filed at 9/6/2021 0031  Gross per 24 hour   Intake 1300 ml   Output 700 ml   Net 600 ml         +BM on 9/4      Recent Labs     09/04/21  0455 09/05/21  0635 09/06/21  0445   WBC 13.0* 14.1* 14.7*   HGB 11.7* 11.3* 11.4*   HCT 36.8* 35.7* 35.4*   * 158 176      Recent Labs     09/04/21  1400 09/05/21  0635 09/06/21  0445   BUN 22 22 25*   CREATININE 1.0 1.0 1.0         CXR pending for today   Telemetry: Afib       PE  Cardiac: IRRR  Lungs: decreased bases  Chest incision with intact JONE DSD. Sternum stable. Prior chest tube site incisions C/D/I, no erythema with intact sutures. Epicardial pacing wires present and secure. Abd: Soft, nontender, +BS  Ext: Incisions C/D/I, approximated, no erythema, + edema            A/P: POD# 7     1. CAD, AS, ischemic cardiomyopathy   --Stable s/p AVR, MVrp, CABG, DERRICK exclusion   --Post op GABRIELE reveals 20%  EF- on inotropes    --will order TTE prior to discharge now off inotrope. Ordered 9/5. ( Life vest ordered 9/3)   --Scr stable- Scr 1.0  -- ASA/ BB/statin -  --reinforce sternal precautions  --continue epicardial pacing wires  -- All CTs out   -- IV Solu-cortef transitioned to PO, cont to taper ( currently on 40mg PO )     2. Expected acute blood loss anemia secondary to open heart surgery  --stable  -- hgb 11.4        3. Afib   --Cont PO amio  -- Dobut off 9/4, BB started last evening, switched to Toprol XL today for afib, up-titrate as able   -- Eliquis started 9/5  -- per cardiology note consider outpatient cardioversion       4. Expected acute respiratory insufficiency in the setting following surgery  --continue duonebs with ezpap  --encourage C&DB, SMI  --currently on RA  -- Empiric zosyn Day 6  -- Lasix again today   -- check CXR      5.  Acute on chronic systolic heart failure  - ICM; EF ~20%  -DOS 0.09units/min vasopressin, 16mcq/min epinephrine and 5 mcq/kg/min dobutamine  -POD1- hemodynamics much improved, lactic downtrending, creatinine and lfts NML-- down to 0.05 units vasopressin, 10mcq/min epinephrine, dobutamine up to 7.5mcq. CI 2.2, CO 5.6, SVR 1278, mixed venous 83  -POD2-shock resolved, off vasopressin, epinephrine, remains on dobutamine  -POD3-Dobutamine dropped to 2mcq, plan to slowly wean off then introduce heart failure meds   - POD4 Dobut decreased to 1mcq, plan to cont slowly wean, then introduce GDMT   - POD 5 Dobut dec to 0.5 mg, if hemodynamics remain stable this evening, will shut off - plan to introduce GDMT once off inotropes   POD 6 Dobut off last evening, monitor off today, start coreg 3.125 later tonight of BP remains stable,  add other GDMT as BP allows   POD 7 BP stable, BB switched to Toprol XL today, up-titrate as able, plan to start ACE vs Entresto tomm if BP allows. Cont dosing IV lasix today. Awaiting echo to eval EF post op         6. Constipation--expected delayed return of bowel function  --secondary to anesthesia, narcotics, decreased oral intake, and decreased physical activity   --resolved   --Encouraged continued increase in oral intake and activity.      7. Uncontrolled DMII  - Hemoglobin A1c 8.0  - Endocrine consulted- appreciate input  - Monitor closely while on steroids   - Cont lantus and SSI     8. Hyponatremia  -  today- will set fluid restriction to 1000ml  - Monitor     9. Leukocytosis  - WBC 14.7- afebrile  - On Zosyn day 6, likely reactive secondary to steroids  ( steroids being tapered)   - Will check CXR today and monitor      10.  Expected deconditioning in the setting following surgery  --Increase activity as tolerated  --PT/OT     DVT prophylaxis:  --continue bilateral knee high LILIANA hose  --continue PCDs  --continue progressive ambulation  -- Eliquis started 9/5      Dispo: Rehab as early as tomm        This patient's case and care plan was discussed with the attending surgeon

## 2021-09-06 NOTE — PROGRESS NOTES
Physical Therapy  Treatment Note    Name: Elroy Shabazz  : 1960  MRN: [id number]      Date of Service: 2021    Evaluating PT:  Sanjiv Prabhakar, PT, DPT [id number]    Room #:  2685/2998-W  Diagnosis:  CAD in native artery [I25.10]  PMHx/PSHx:  CHF, DM, HTN  Procedure/Surgery:   AVR, MV repair, CABG x 2  Precautions:  Falls, Sternal  Equipment Needs:  TBD    SUBJECTIVE:  Pt lives alone in a 1st floor apartment with 1 stairs to enter and no rail. Pt ambulated without device and was independent PTA. OBJECTIVE:   Initial Evaluation  Date: 21 Treatment   Short Term/ Long Term   Goals   AM-PAC 6 Clicks  57/24    Was pt agreeable to Eval/treatment? Yes Yes    Does pt have pain? Surgical pain but no rating given No c/o pain    Bed Mobility  Rolling: NT  Supine to sit: ModA x 2 with HOB elevated  Sit to supine: NT  Scooting: MaxA Rolling: supervision  Supine to sit: supervision  Sit to supine: supervision  Scooting: supervision Elias   Transfers Sit to stand: [address] [facility] chair  Stand to sit: ModA  Stand pivot: Elias no device Sit to stand: SBA  Stand to sit: SBA  Stand pivot: SBA Independent   Ambulation   40 feet with Elias no device 200 feet with no AD SBA >400 feet Independently   Stair negotiation: ascended and descended NT NA >4 steps with 1 rail Mod Independent     BLE ROM is WFL  BLE Strength is grossly 4/5 to 4+/5  Balance: sitting on EOB Independent and standing with no AD SBA    Vitals:  PT O2 sat on RA during and after amb was 93%    Patient education  Pt educated on sternal precautions    Patient response to education:   Pt verbalized understanding Pt demonstrated skill Pt requires further education in this area   yes yes no     ASSESSMENT:    Comments:  Pt was found sitting up in chair asleep, but easy to wake up and was agreeable to PT. He walked in the [place] with no AD and had occasional mild unsteady gait at times that required SBA for safety.   He c/o fatigue that limited amb distance and any attempt at stairs this session. Pt's gait was slow and required increased time to complete. After amb he walked back into his room and sat down on the bed. From there pt demonstrated his ability to get into and out of the bed with supervision. Pt stood up and walked 25 feet back to chair on other side of his room and sat down. Pt is moving better, but with limited ability to use his arms for support, being slight unsteady on his feet at times, and living alone he is fearful of falling and being unable to care for himself at this time. Pt is a risk for falling and should not amb on his own at this time. Treatment:  Patient practiced and was instructed in the following treatment:     Bed mobility, transfers, and gait to improve functional strength, balance, and endurance. Pt was left sitting up in chair per his request with call light left by patient. PLAN:    Pt is making good progress toward established Physical Therapy goals. Continue with physical therapy current plan of care. Time in  10:10  Time out  10:35    Total Treatment Time  25 minutes     Evaluation Time includes thorough review of current medical information, gathering information on past medical history/social history and prior level of function, completion of standardized testing/informal observation of tasks, assessment of data and education on plan of care and goals. CPT codes:  [] Low Complexity PT evaluation 64327  [] Moderate Complexity PT evaluation 90978  [] High Complexity PT evaluation 93020  [] PT Re-evaluation 94426  [] Gait training 71522 ** minutes  [] Manual therapy 47246 ** minutes  [x] Therapeutic activities 28462 25 minutes  [] Therapeutic exercises 46731 ** minutes  [] Neuromuscular reeducation 01808 ** minutes     Luke Decker, P.T.   License Number: [id number]

## 2021-09-06 NOTE — PROGRESS NOTES
ENDOCRINOLOGY PROGRESS NOTE      Date of admission: 8/30/2021  Date of service: 9/5/2021  Admitting physician: Allegra Dickey MD   Primary Care Physician: Carmelita Evans DO  Consultant physician: Mary Ramirez MD     Reason for the consultation:  Uncontrolled DM    History of Present Illness: The history is provided by the patient. Accuracy of the patient data is excellent    Garrison Melara is a very pleasant 63 y.o. old male with PMH uncontrolled DM, severe aortic stenosis; severe multivessel coronary artery disease; severe ischemic cardiomyopathy with an ejection fraction of 15%listed below admitted to [facility] on 8/30/2021 and underwent MVR and CABG x 2 on admission day , endocrine service was consulted for diabetes management.     Prior to admission  Seen and examined this AM, BG still above goal     Inpatient diet:   Carb Restricted diet     Point of care glucose monitoring   (Independently reviewed)   Recent Labs     09/04/21  0412 09/04/21  1133 09/04/21  1647 09/04/21  2018 09/05/21  0619 09/05/21  1112 09/05/21  1708 09/05/21 2008   GLUMET 206* 177* 86 157* 193* 205* 258* 290*     Scheduled Meds:   apixaban  5 mg Oral BID    carvedilol  3.125 mg Oral BID WC    [START ON 9/6/2021] insulin glargine  36 Units SubCUTAneous Nightly    [START ON 9/6/2021] insulin lispro  12 Units SubCUTAneous TID WC    insulin glargine  6 Units SubCUTAneous Once    insulin lispro  0-12 Units SubCUTAneous TID WC    insulin lispro  0-6 Units SubCUTAneous Nightly    aspirin  81 mg Oral Daily    bisacodyl  5 mg Oral Daily    sennosides-docusate sodium  1 tablet Oral BID    magnesium hydroxide  30 mL Oral Daily    ferrous sulfate  325 mg Oral BID WC    vitamin C  500 mg Oral BID    folic acid  1 mg Oral Daily    pantoprazole  40 mg Oral Daily    sodium chloride flush  5-40 mL IntraVENous 2 times per day    heparin flush  3 mL IntraVENous 2 times per day    piperacillin-tazobactam  3,375 mg IntraVENous Q8H  amiodarone  200 mg Oral TID    atorvastatin  40 mg Oral Nightly    sodium chloride flush  10 mL IntraVENous 2 times per day    magnesium oxide  400 mg Oral Daily    ipratropium-albuterol  1 ampule Inhalation Q4H WA    tamsulosin  0.4 mg Oral Daily     PRN Meds:   perflutren lipid microspheres, 1.5 mL, ONCE PRN  acetaminophen, 650 mg, Q4H PRN  oxyCODONE-acetaminophen, 1 tablet, Q4H PRN   Or  oxyCODONE-acetaminophen, 2 tablet, Q4H PRN  potassium chloride, 20 mEq, PRN  bisacodyl, 10 mg, Daily PRN  sodium chloride, 1 spray, PRN  trimethobenzamide, 200 mg, Q6H PRN  morphine, 2 mg, Q4H PRN  sodium chloride flush, 5-40 mL, PRN  heparin flush, 3 mL, PRN  sodium chloride flush, 10 mL, PRN  glucose, 15 g, PRN  dextrose, 12.5 g, PRN  glucagon (rDNA), 1 mg, PRN  dextrose, 100 mL/hr, PRN      Continuous Infusions:   dextrose         Review of Systems  All systems reviewed. All negative except for symptoms mentioned in HPI     OBJECTIVE    BP (!) 140/88 Comment: manual  Pulse 83   Temp 97.2 °F (36.2 °C) (Temporal)   Resp 16   Ht 6' 3\" (1.905 m)   Wt 294 lb 6.4 oz (133.5 kg)   SpO2 94%   BMI 36.80 kg/m²     Intake/Output Summary (Last 24 hours) at 9/5/2021 2036  Last data filed at 9/5/2021 1220  Gross per 24 hour   Intake 1080 ml   Output 300 ml   Net 780 ml       Physical examination:  General: awake alert, oriented x3  HEENT: normocephalic non traumatic, no exophthalmos   Neck: supple,    Pulm: good equal air entry no added sounds  CVS: S1 + S2, s/p CABG   Abd: soft lax, no tenderness  Skin: warm, no lesions, no rash.    Neuro: CN intact, sensation decreased bilateral , muscle power normal  Psych: normal mood, and affect    Review of Laboratory Data:  I personally reviewed the following labs:   Recent Labs     09/03/21  0140 09/04/21  0455 09/05/21  0635   WBC 15.5* 13.0* 14.1*   RBC 3.83 3.81 3.71*   HGB 11.6* 11.7* 11.3*   HCT 36.3* 36.8* 35.7*   MCV 94.8 96.6 96.2   MCH 30.3 30.7 30.5   MCHC 32.0 31.8* 31.7* RDW 16.0* 15.8* 15.8*   * 129* 158   MPV 11.4 11.5 11.5     Recent Labs     09/03/21  0140 09/03/21  0140 09/04/21  0455 09/04/21  1400 09/05/21  0635      < > 133 134 132   K 4.8   < > 5.1* 4.5 4.8   CL 99   < > 97* 98 98   CO2 24   < > 27 29 27   BUN 28*   < > 25* 22 22   CREATININE 1.1   < > 1.0 1.0 1.0   GLUCOSE 177*   < > 202* 86 208*   CALCIUM 8.4*   < > 8.5* 8.5* 8.8   PROT 6.1*  --  5.8*  --  5.8*   LABALBU 3.2*  --  3.1*  --  3.1*   BILITOT 0.7  --  0.7  --  0.6   ALKPHOS 81  --  63  --  75   AST 23  --  20  --  20   ALT 12  --  14  --  17    < > = values in this interval not displayed. No results found for: BHYDRXBUT  Lab Results   Component Value Date    LABA1C 8.0 08/26/2021    LABA1C 8.4 08/10/2021    LABA1C 8.1 06/15/2021     No results found for: TSH, T4FREE, D0VIBGG, FT3, E0AWWDG, TSI, TPOABS, THGAB  Lab Results   Component Value Date    LABA1C 8.0 08/26/2021    GLUCOSE 208 09/05/2021    GLUCOSE 259 04/13/2012     Lab Results   Component Value Date    TRIG 70 06/18/2021    HDL 33 06/22/2021    LDLCALC 87 06/22/2021    CHOL 124 06/18/2021       Blood culture   No results found for: [facility]    Radiology:  XR CHEST PORTABLE   Final Result   Right IJ catheter is removed. Mild to moderate pulmonary vascular congestion. XR CHEST PORTABLE   Final Result   1. Cardiomegaly. There are no findings of pneumonia or failure. 2. Stable position of the chest tubes. There is no right or left   pneumothorax. XR CHEST PORTABLE   Final Result   Left upper extremity PICC line terminates in the SVC. Kirkland-Kenia catheter   removed. Stable cardiomegaly. Likely moderate size left-sided pleural effusion is   unchanged. Moderate pulmonary edema. XR CHEST PORTABLE   Final Result   1. There is no right or left pneumothorax. 2. Bilateral perihilar and lower lobe airspace disease. 3. Marked cardiomegaly. XR CHEST PORTABLE   Final Result   Mild cardiomegaly.

## 2021-09-06 NOTE — PROGRESS NOTES
OCCUPATIONAL THERAPY TREATMENT NOTE    [facility] [address] [address] [address]       Date:2021  Patient Name: Stephan Putnam  MRN: [id number]  : 1960  Room: [address] [place] [address]        Evaluating OT: Rebekah Amber, OTR/L 0680     Referring Provider: JASMINE Glez   Specific Provider Orders/Date: OT eval and treat (21)        Diagnosis: AS/CAD/ICM/CHF      Surgery/Procedures:  AVR/MVR/CABG x 2      Pertinent Medical History: CHF, DM, HTN      *Precautions:  Fall Risk, sternal, O2, chest tube     Assessment of current deficits   [x]? ? Functional mobility            [x]? ?ADLs           [x]? ? Strength                  []? ? Cognition  [x]? ? Functional transfers          [x]? ?IADLs          [x]? ? Safety Awareness   [x]? ?Endurance  []? ? Fine Motor Coordination   [x]? ? Balance       []? ? Vision/perception    []? ? Sensation      []? ?Gross Motor Coordination [x]? ?ROM           []? ? Delirium                  []? ? Motor Control     []? ?Communication      OT PLAN OF CARE   OT POC based on physician orders, patient diagnosis and results of clinical assessment.        Frequency/Duration: 1-3 days/wk for 1-2 weeks PRN     Specific OT Treatment to include:   ADL retraining/adapted techniques and AE recommendations to increase functional independence within precautions                    Energy conservation techniques to improve tolerance for selfcare routine   Functional transfer/mobility training/DME recommendations for increased independence, safety and fall prevention         Patient/family education to increase safety and functional independence within precautions             Environmental modifications for safe mobility and completion of ADLs                             Therapeutic activity to improve functional performance during ADLs                                         Therapeutic exercise to improve tolerance and functional strength for ADLs   Balance retraining exercises/tasks for facilitation of postural control with dynamic challenges during ADLs .       Recommended Adaptive Equipment:  LB dressing AE, raised commode seat and shower seat as needed pending progress.      Home Living: Pt lives alone  in a first floor apt with 1 step(s) to enter and no rail(s); bed/bath on first floor  Bathroom setup: walk in shower with seat; standard height commode  Equipment owned: shower saet     Prior Level of Function: IND with ADLs;  IND with IADLs.   No device for ambulation. Driving: yes  Occupation: [employer]     Pain Level: pt c/o chest pain with mobility  this session; does not rate     Cognition: A&O: 4/4    Follows  3 step commands appropriately.             Memory: Good              Comprehension Good              Problem solving: Fair+/Good              Judgement/safety: Fair+/Good                 Communication skills: WFL              Vision: WFL                     Glasses:no                                                        Hearing: WFL                RASS: 0  CAM-ICU: (NT) Delirium      UE Assessment:  Hand Dominance: Right [x]? ?  Left []? ?       ROM Strength STM goal: PRN   RUE  Grossly WFL within precautions Not formally tested; grossly WFL              WNL for ADLS      LUE Grossly WFL within precautions Not formally tested; grossly WFL              WNL for ADLS         Sensation: No c/o numbness or tingling in extremities   Tone: WNL   Edema: WFL     Functional Assessment: AM-PAC Daily Activity Raw Score: 15/24    Initial Eval Status  Date: 9/1 Treatment Status  Date:9/6/21 STG=LTG  Time Frame: 5-7 days   Feeding S; set up  IND   Seated up in chair                      IND  while seated up in chair to increase activity tolerance         Grooming Mod A Min A  Standing sink level to perform grooming tasks requiring assist for safety                        Miguelito   while standing sink level demonstrating F tolerance; F balance.      UB dressing/bathing Max A  Max A to [facility] gown                       Miguelito   demonstrating G knowledge of precautions during tasks      LB dressing/bathing Dep     Mod A  after instruction on LB dressing AE for safe reach within precautions MOD A to anabel pants using reacher v/c's for technique     Pt educated with regards to bathing AE, DME, and ECt's                       Miguelito  using AE as needed for safe reach/ energy conservation        Toileting NT  NT                      Miguelito      Bed Mobility  Supine to sit:   Mod A+2     Sit to supine: NT N/t pt seated in recliner upon arrival                        Min A  in prep of ADL tasks & transfers   Functional Transfers Sit to stand: Min A  from higher bed surface;     Mod A from lower chair surface     Stand to sit: Mod A Sit<>stand: Min A from recliner v/c's for following sternal precautions                       Miguelito  sit<>stand/functional bathroom transfers using AD/DME as needed for balance and safety   Functional Mobility Min A  no device; Shuffled steps Min A  Per last report                         Miguelito   functional/bathroom mobility using AD as needed & demonstrating G safety      Balance Sitting:     Static:  SBA    Dynamic:Min A  Standing: Min A Sitting: SBA  Standing: Min A  Miguelito dynamic sitting balance; Miguelito dynamic standing balance  during ADL tasks & transfers   Endurance/Activity Tolerance    F tolerance with moderate activity.    Fair+ tolerance with moderate activity; sh ort breaks G   tolerance with moderate activity/self care routine   Visual/  Perceptual               WFL                                           Comments: Upon arrival pt seated in recliner, agreeable to therapy session. Pt educated with regards to functional transfers, sternal precautions, LE dressing AE, DME, bathing AE, energy conservation techniques, bathroom safety.  At end of session pt seated in recliner all lines and tubes intact, call light within reach. · Pt has made good  progress towards set goals.    · Continue with current plan of care      Treatment Time In:1100            Treatment Time Out: 0940                Treatment Charges: Mins Units   Ther Ex  [id number]     Manual Therapy [address] [facility]     Thera Activities 84670 13 1   ADL/Home t [id number] 15 1   Neuro Re-ed 88914     Group Therapy      Orthotic manage/training  42502     Non-Billable Time     Total Timed Treatment 28 1491 [facility] [address]

## 2021-09-07 ENCOUNTER — APPOINTMENT (OUTPATIENT)
Dept: CT IMAGING | Age: 61
DRG: 163 | End: 2021-09-07
Attending: THORACIC SURGERY (CARDIOTHORACIC VASCULAR SURGERY)
Payer: COMMERCIAL

## 2021-09-07 VITALS
TEMPERATURE: 97.9 F | SYSTOLIC BLOOD PRESSURE: 140 MMHG | HEIGHT: 75 IN | OXYGEN SATURATION: 93 % | RESPIRATION RATE: 18 BRPM | DIASTOLIC BLOOD PRESSURE: 80 MMHG | BODY MASS INDEX: 36.65 KG/M2 | HEART RATE: 76 BPM | WEIGHT: 294.8 LBS

## 2021-09-07 LAB
ALBUMIN SERPL-MCNC: 3.1 G/DL (ref 3.5–5.2)
ALP BLD-CCNC: 73 U/L (ref 40–129)
ALT SERPL-CCNC: 24 U/L (ref 0–40)
ANION GAP SERPL CALCULATED.3IONS-SCNC: 9 MMOL/L (ref 7–16)
AST SERPL-CCNC: 23 U/L (ref 0–39)
BILIRUB SERPL-MCNC: 0.6 MG/DL (ref 0–1.2)
BUN BLDV-MCNC: 27 MG/DL (ref 6–23)
CALCIUM SERPL-MCNC: 9.1 MG/DL (ref 8.6–10.2)
CHLORIDE BLD-SCNC: 96 MMOL/L (ref 98–107)
CO2: 28 MMOL/L (ref 22–29)
CREAT SERPL-MCNC: 1 MG/DL (ref 0.7–1.2)
GFR AFRICAN AMERICAN: >60
GFR NON-AFRICAN AMERICAN: >60 ML/MIN/1.73
GLUCOSE BLD-MCNC: 233 MG/DL (ref 74–99)
HCT VFR BLD CALC: 37.3 % (ref 37–54)
HEMOGLOBIN: 11.9 G/DL (ref 12.5–16.5)
LV EF: 28 %
LVEF MODALITY: NORMAL
MAGNESIUM: 2 MG/DL (ref 1.6–2.6)
MCH RBC QN AUTO: 29.9 PG (ref 26–35)
MCHC RBC AUTO-ENTMCNC: 31.9 % (ref 32–34.5)
MCV RBC AUTO: 93.7 FL (ref 80–99.9)
METER GLUCOSE: 205 MG/DL (ref 74–99)
METER GLUCOSE: 224 MG/DL (ref 74–99)
PDW BLD-RTO: 15.7 FL (ref 11.5–15)
PLATELET # BLD: 189 E9/L (ref 130–450)
PMV BLD AUTO: 11.8 FL (ref 7–12)
POTASSIUM SERPL-SCNC: 4.6 MMOL/L (ref 3.5–5)
RBC # BLD: 3.98 E12/L (ref 3.8–5.8)
SARS-COV-2, NAAT: NOT DETECTED
SODIUM BLD-SCNC: 133 MMOL/L (ref 132–146)
TOTAL PROTEIN: 6.1 G/DL (ref 6.4–8.3)
WBC # BLD: 14.5 E9/L (ref 4.5–11.5)

## 2021-09-07 PROCEDURE — 80053 COMPREHEN METABOLIC PANEL: CPT

## 2021-09-07 PROCEDURE — 2580000003 HC RX 258: Performed by: NURSE PRACTITIONER

## 2021-09-07 PROCEDURE — 93798 PHYS/QHP OP CAR RHAB W/ECG: CPT

## 2021-09-07 PROCEDURE — 6360000002 HC RX W HCPCS: Performed by: NURSE PRACTITIONER

## 2021-09-07 PROCEDURE — 6370000000 HC RX 637 (ALT 250 FOR IP): Performed by: NURSE PRACTITIONER

## 2021-09-07 PROCEDURE — 36415 COLL VENOUS BLD VENIPUNCTURE: CPT

## 2021-09-07 PROCEDURE — 99232 SBSQ HOSP IP/OBS MODERATE 35: CPT | Performed by: INTERNAL MEDICINE

## 2021-09-07 PROCEDURE — 71250 CT THORAX DX C-: CPT

## 2021-09-07 PROCEDURE — 82962 GLUCOSE BLOOD TEST: CPT

## 2021-09-07 PROCEDURE — 85027 COMPLETE CBC AUTOMATED: CPT

## 2021-09-07 PROCEDURE — 6360000002 HC RX W HCPCS: Performed by: PHYSICIAN ASSISTANT

## 2021-09-07 PROCEDURE — 6370000000 HC RX 637 (ALT 250 FOR IP): Performed by: INTERNAL MEDICINE

## 2021-09-07 PROCEDURE — 6370000000 HC RX 637 (ALT 250 FOR IP): Performed by: PHYSICIAN ASSISTANT

## 2021-09-07 PROCEDURE — 87635 SARS-COV-2 COVID-19 AMP PRB: CPT

## 2021-09-07 PROCEDURE — 94640 AIRWAY INHALATION TREATMENT: CPT

## 2021-09-07 PROCEDURE — 93306 TTE W/DOPPLER COMPLETE: CPT

## 2021-09-07 PROCEDURE — 83735 ASSAY OF MAGNESIUM: CPT

## 2021-09-07 PROCEDURE — 36592 COLLECT BLOOD FROM PICC: CPT

## 2021-09-07 RX ORDER — DOCUSATE SODIUM 100 MG/1
100 CAPSULE, LIQUID FILLED ORAL 2 TIMES DAILY PRN
Qty: 60 CAPSULE | Refills: 0
Start: 2021-09-07 | End: 2021-10-07

## 2021-09-07 RX ORDER — OXYCODONE HYDROCHLORIDE AND ACETAMINOPHEN 5; 325 MG/1; MG/1
1 TABLET ORAL EVERY 6 HOURS PRN
Qty: 28 TABLET | Refills: 0 | Status: SHIPPED | OUTPATIENT
Start: 2021-09-07 | End: 2021-09-14

## 2021-09-07 RX ORDER — FUROSEMIDE 40 MG/1
20 TABLET ORAL DAILY
Qty: 60 TABLET | Refills: 3
Start: 2021-09-07 | End: 2021-10-13

## 2021-09-07 RX ORDER — PANTOPRAZOLE SODIUM 40 MG/1
40 TABLET, DELAYED RELEASE ORAL DAILY
Qty: 14 TABLET | Refills: 0
Start: 2021-09-08 | End: 2021-10-13

## 2021-09-07 RX ORDER — ASCORBIC ACID 500 MG
500 TABLET ORAL 2 TIMES DAILY
Qty: 60 TABLET | Refills: 0
Start: 2021-09-07 | End: 2021-11-12

## 2021-09-07 RX ORDER — FOLIC ACID 1 MG/1
1 TABLET ORAL DAILY
Qty: 30 TABLET | Refills: 0
Start: 2021-09-08 | End: 2021-10-13

## 2021-09-07 RX ORDER — PREDNISONE 10 MG/1
TABLET ORAL
Qty: 5 TABLET | Refills: 0
Start: 2021-09-07 | End: 2021-10-13

## 2021-09-07 RX ORDER — POTASSIUM CHLORIDE 750 MG/1
10 TABLET, EXTENDED RELEASE ORAL EVERY OTHER DAY
Qty: 90 TABLET | Refills: 1
Start: 2021-09-07 | End: 2021-10-13

## 2021-09-07 RX ORDER — FERROUS SULFATE 325(65) MG
325 TABLET ORAL 2 TIMES DAILY WITH MEALS
Qty: 60 TABLET | Refills: 0
Start: 2021-09-07 | End: 2021-10-13

## 2021-09-07 RX ORDER — METOPROLOL SUCCINATE 25 MG/1
12.5 TABLET, EXTENDED RELEASE ORAL DAILY
Qty: 30 TABLET | Refills: 3
Start: 2021-09-08 | End: 2021-11-12

## 2021-09-07 RX ORDER — INSULIN GLARGINE 100 [IU]/ML
36 INJECTION, SOLUTION SUBCUTANEOUS NIGHTLY
Qty: 10 ML | Refills: 3
Start: 2021-09-07

## 2021-09-07 RX ORDER — AMIODARONE HYDROCHLORIDE 200 MG/1
TABLET ORAL
Qty: 44 TABLET | Refills: 0
Start: 2021-09-07 | End: 2021-10-13

## 2021-09-07 RX ORDER — AMIODARONE HYDROCHLORIDE 200 MG/1
TABLET ORAL
Qty: 34 TABLET | Refills: 0
Start: 2021-09-07

## 2021-09-07 RX ORDER — LISINOPRIL 10 MG/1
5 TABLET ORAL DAILY
Status: DISCONTINUED | OUTPATIENT
Start: 2021-09-07 | End: 2021-09-07 | Stop reason: HOSPADM

## 2021-09-07 RX ORDER — FUROSEMIDE 10 MG/ML
20 INJECTION INTRAMUSCULAR; INTRAVENOUS ONCE
Status: COMPLETED | OUTPATIENT
Start: 2021-09-07 | End: 2021-09-07

## 2021-09-07 RX ORDER — LISINOPRIL 5 MG/1
5 TABLET ORAL DAILY
Qty: 30 TABLET | Refills: 3
Start: 2021-09-08 | End: 2021-09-15 | Stop reason: ALTCHOICE

## 2021-09-07 RX ADMIN — INSULIN LISPRO 10 UNITS: 100 INJECTION, SOLUTION INTRAVENOUS; SUBCUTANEOUS at 12:42

## 2021-09-07 RX ADMIN — BISACODYL 5 MG: 5 TABLET, COATED ORAL at 08:37

## 2021-09-07 RX ADMIN — PREDNISONE 40 MG: 20 TABLET ORAL at 08:37

## 2021-09-07 RX ADMIN — APIXABAN 5 MG: 5 TABLET, FILM COATED ORAL at 08:37

## 2021-09-07 RX ADMIN — INSULIN LISPRO 6 UNITS: 100 INJECTION, SOLUTION INTRAVENOUS; SUBCUTANEOUS at 08:37

## 2021-09-07 RX ADMIN — SODIUM CHLORIDE, PRESERVATIVE FREE 10 ML: 5 INJECTION INTRAVENOUS at 06:19

## 2021-09-07 RX ADMIN — SODIUM CHLORIDE, PRESERVATIVE FREE 10 ML: 5 INJECTION INTRAVENOUS at 00:59

## 2021-09-07 RX ADMIN — METOPROLOL SUCCINATE 12.5 MG: 25 TABLET, EXTENDED RELEASE ORAL at 08:37

## 2021-09-07 RX ADMIN — AMIODARONE HYDROCHLORIDE 200 MG: 200 TABLET ORAL at 08:37

## 2021-09-07 RX ADMIN — SODIUM CHLORIDE, PRESERVATIVE FREE 10 ML: 5 INJECTION INTRAVENOUS at 08:41

## 2021-09-07 RX ADMIN — ASPIRIN 81 MG: 81 TABLET ORAL at 08:37

## 2021-09-07 RX ADMIN — FERROUS SULFATE TAB 325 MG (65 MG ELEMENTAL FE) 325 MG: 325 (65 FE) TAB at 08:37

## 2021-09-07 RX ADMIN — FUROSEMIDE 20 MG: 20 INJECTION, SOLUTION INTRAMUSCULAR; INTRAVENOUS at 08:36

## 2021-09-07 RX ADMIN — IPRATROPIUM BROMIDE AND ALBUTEROL SULFATE 1 AMPULE: .5; 2.5 SOLUTION RESPIRATORY (INHALATION) at 14:11

## 2021-09-07 RX ADMIN — INSULIN LISPRO 10 UNITS: 100 INJECTION, SOLUTION INTRAVENOUS; SUBCUTANEOUS at 08:37

## 2021-09-07 RX ADMIN — LISINOPRIL 5 MG: 10 TABLET ORAL at 11:55

## 2021-09-07 RX ADMIN — HEPARIN 300 UNITS: 100 SYRINGE at 08:40

## 2021-09-07 RX ADMIN — OXYCODONE HYDROCHLORIDE AND ACETAMINOPHEN 500 MG: 500 TABLET ORAL at 08:37

## 2021-09-07 RX ADMIN — Medication 10 ML: at 08:41

## 2021-09-07 RX ADMIN — FOLIC ACID 1 MG: 1 TABLET ORAL at 08:37

## 2021-09-07 RX ADMIN — SODIUM CHLORIDE, PRESERVATIVE FREE 10 ML: 5 INJECTION INTRAVENOUS at 06:21

## 2021-09-07 RX ADMIN — AMIODARONE HYDROCHLORIDE 200 MG: 200 TABLET ORAL at 13:11

## 2021-09-07 RX ADMIN — PIPERACILLIN AND TAZOBACTAM 3375 MG: 3; .375 INJECTION, POWDER, LYOPHILIZED, FOR SOLUTION INTRAVENOUS at 08:41

## 2021-09-07 RX ADMIN — Medication 400 MG: at 08:36

## 2021-09-07 RX ADMIN — INSULIN LISPRO 6 UNITS: 100 INJECTION, SOLUTION INTRAVENOUS; SUBCUTANEOUS at 12:41

## 2021-09-07 RX ADMIN — DOCUSATE SODIUM 50 MG AND SENNOSIDES 8.6 MG 1 TABLET: 8.6; 5 TABLET, FILM COATED ORAL at 08:36

## 2021-09-07 RX ADMIN — PIPERACILLIN AND TAZOBACTAM 3375 MG: 3; .375 INJECTION, POWDER, LYOPHILIZED, FOR SOLUTION INTRAVENOUS at 00:58

## 2021-09-07 RX ADMIN — PANTOPRAZOLE SODIUM 40 MG: 40 TABLET, DELAYED RELEASE ORAL at 08:37

## 2021-09-07 ASSESSMENT — PAIN SCALES - GENERAL
PAINLEVEL_OUTOF10: 0
PAINLEVEL_OUTOF10: 0

## 2021-09-07 NOTE — PROGRESS NOTES
POD#8 Awake, alert. No complaints. Denies CP, palpitations, SOB at rest, dizziness/lightheadedness. Vitals:    09/06/21 2330 09/07/21 0415 09/07/21 0632 09/07/21 0743   BP: (!) 131/96 138/88     Pulse: 74 67     Resp: 18 18     Temp: 97.3 °F (36.3 °C) 97.1 °F (36.2 °C)     TempSrc: Temporal Oral     SpO2: 94% 97%  93%   Weight:   294 lb 12.8 oz (133.7 kg)    Height:         O2: RA      Intake/Output Summary (Last 24 hours) at 9/7/2021 0801  Last data filed at 9/7/2021 7940  Gross per 24 hour   Intake 1010 ml   Output 3050 ml   Net -2040 ml         +BM on 9/6    UO: 700mL/8hr       Recent Labs     09/05/21  0635 09/06/21  0445 09/07/21  0625   WBC 14.1* 14.7* 14.5*   HGB 11.3* 11.4* 11.9*   HCT 35.7* 35.4* 37.3    176 189      Recent Labs     09/05/21  0635 09/06/21  0445 09/07/21  0625   BUN 22 25* 27*   CREATININE 1.0 1.0 1.0       Telemetry: Afib      PE  Cardiac: irreg  Lungs: decreased bases  Chest incision with intact JONE DSD. Sternum stable. Prior chest tube site incisions C/D/I, no erythema with intact sutures. Epicardial pacing wires present and secure. Abd: Soft, nontender, +BS  Ext: Incisions C/D/I, approximated, no erythema, + edema               A/P: POD# 8     1. CAD, AS, ischemic cardiomyopathy   --Stable s/p AVR, MVrp, CABG, DERRICK exclusion   --Post op GABRIELE reveals 20%  EF- on inotropes    --will order TTE prior to discharge now off inotrope.  Ordered 9/5.  (Life vest ordered 9/3)   --Scr stable- Scr 1.0  -- ASA/BB/statin - Eliquis for Afib  --reinforce sternal precautions  --continue epicardial pacing wires  --All CTs out   --IV Solu-cortef transitioned to PO, cont to taper (currently on 40mg PO)       2. Expected acute blood loss anemia secondary to open heart surgery  --stable  -- hgb 11.9        3.  Afib   --Cont PO amio  --Dobut off 9/4, BB started 9/5, switched to Toprol XL today for afib, up-titrate as able   -- Eliquis started 9/5  -- per cardiology note consider outpatient cardioversion         4. Expected acute respiratory insufficiency in the setting following surgery  --continue duonebs with ezpap  --encourage C&DB, SMI  --currently on RA  --Empiric zosyn Day 7 - DC after today  --Lasix again today        5.  Acute on chronic systolic heart failure  - ICM; EF ~20%  -DOS 0.09units/min vasopressin, 16mcq/min epinephrine and 5 mcq/kg/min dobutamine  -POD1- hemodynamics much improved, lactic downtrending, creatinine and lfts NML-- down to 0.05 units vasopressin, 10mcq/min epinephrine, dobutamine up to 7.5mcq. CI 2.2, CO 5.6, SVR 1278, mixed venous 83  -POD2-shock resolved, off vasopressin, epinephrine, remains on dobutamine  -POD3-Dobutamine dropped to 2mcq, plan to slowly wean off then introduce heart failure meds   - POD4 Dobut decreased to 1mcq, plan to cont slowly wean, then introduce GDMT   - POD 5 Dobut dec to 0.5 mg, if hemodynamics remain stable this evening, will shut off - plan to introduce GDMT once off inotropes   - POD 6 Dobut off last evening, monitor off today, start coreg 3.125 later tonight of BP remains stable,  add other GDMT as BP allows   - POD 7 BP stable, BB switched to Toprol XL today, up-titrate as able, plan to start ACE vs Entresto tomm if BP allows. Cont dosing IV lasix today. Awaiting echo to eval EF post op   - POD 8 - BP stable on Toprol XL - add lisinopril today - continue IV lasix (will transition to PO on DC) - needs echo today and chest CT per Dr. Jay Ruvalcaba        6. Constipation--expected delayed return of bowel function  --secondary to anesthesia, narcotics, decreased oral intake, and decreased physical activity   --resolved   --Encouraged continued increase in oral intake and activity.        7. Uncontrolled DMII  - Hemoglobin A1c 8.0  - Endocrine consulted- appreciate input  - Monitor closely while on steroids   - Cont lantus and SSI        8.   Hyponatremia  -  today from 131 yesterday - resolved   - Continue fluid restriction to 1000ml  - Monitor        9. Leukocytosis  - WBC 14.5 - afebrile  - On Zosyn day 7, likely reactive secondary to steroids (steroids being tapered)   - CXR yesterday shows vascular congestion but no signs of pneumonia         10. Expected deconditioning in the setting following surgery  --Increase activity as tolerated  --PT/OT       DVT prophylaxis:  --continue bilateral knee high LILIANA hose  --continue PCDs  --continue progressive ambulation  -- Eliquis started 9/5      Dispo: patent ambulated 200ft and now denied by rehab. Will need to ambulate 400ft to go home.  Hopefully by tomorrow      This patient's case and care plan was discussed with the attending surgeon

## 2021-09-07 NOTE — PATIENT CARE CONFERENCE
[facility] Quality Flow/Interdisciplinary Rounds Progress Note        Quality Flow Rounds held on September 7, 2021    Disciplines Attending:  Bedside Nurse, ,  and Nursing Unit Leadership    Rocio Ford was admitted on 8/30/2021  5:21 AM    Anticipated Discharge Date:  Expected Discharge Date: 09/05/21    Disposition:    Benjie Score:  Benjie Scale Score: 20    Readmission Risk              Risk of Unplanned Readmission:  23           Discussed patient goal for the day, patient clinical progression, and barriers to discharge.   The following Goal(s) of the Day/Commitment(s) have been identified:  Diagnostics - Report Results and Labs - Report Results      Jerrica Lacey RN  September 7, 2021

## 2021-09-07 NOTE — PROGRESS NOTES
ENDOCRINOLOGY PROGRESS NOTE      Date of admission: 8/30/2021  Date of service: 9/7/2021  Admitting physician: Oscar Michelle MD   Primary Care Physician: Baltazar Moses DO  Consultant physician: Sonia Tian MD     Reason for the consultation:  Uncontrolled DM    History of Present Illness: The history is provided by the patient. Accuracy of the patient data is excellent    Harsha Darling is a very pleasant 63 y.o. old male with PMH uncontrolled DM, severe aortic stenosis; severe multivessel coronary artery disease; severe ischemic cardiomyopathy with an ejection fraction of 15%listed below admitted [facility] 8/30/[facility] and underwent MVR and CABG x 2 on admission day , endocrine service was consulted for diabetes management.     Prior to admission  No acute issues overnight, BG still above goal , for CT chest today     Inpatient diet:   Carb Restricted diet     Point of care glucose monitoring   (Independently reviewed)   Recent Labs     09/05/21  0619 09/05/21  1112 09/05/21  1708 09/05/21 2008 09/06/21  1119 09/06/21  1615 09/06/21 2013 09/07/21  0641   GLUMET 193* 205* 258* 290* 133* 80 213* 205*     Scheduled Meds:   insulin lispro  10 Units SubCUTAneous TID WC    insulin lispro  0-18 Units SubCUTAneous TID WC    insulin lispro  0-9 Units SubCUTAneous Nightly    metoprolol succinate  12.5 mg Oral Daily    predniSONE  40 mg Oral Daily    apixaban  5 mg Oral BID    insulin glargine  36 Units SubCUTAneous Nightly    aspirin  81 mg Oral Daily    bisacodyl  5 mg Oral Daily    sennosides-docusate sodium  1 tablet Oral BID    magnesium hydroxide  30 mL Oral Daily    ferrous sulfate  325 mg Oral BID WC    vitamin C  500 mg Oral BID    folic acid  1 mg Oral Daily    pantoprazole  40 mg Oral Daily    sodium chloride flush  5-40 mL IntraVENous 2 times per day    heparin flush  3 mL IntraVENous 2 times per day    piperacillin-tazobactam  3,375 mg IntraVENous Q8H    amiodarone  200 mg Oral TID    atorvastatin  40 mg Oral Nightly    sodium chloride flush  10 mL IntraVENous 2 times per day    magnesium oxide  400 mg Oral Daily    ipratropium-albuterol  1 ampule Inhalation Q4H WA    tamsulosin  0.4 mg Oral Daily     PRN Meds:   perflutren lipid microspheres, 1.5 mL, ONCE PRN  acetaminophen, 650 mg, Q4H PRN  oxyCODONE-acetaminophen, 1 tablet, Q4H PRN   Or  oxyCODONE-acetaminophen, 2 tablet, Q4H PRN  potassium chloride, 20 mEq, PRN  bisacodyl, 10 mg, Daily PRN  sodium chloride, 1 spray, PRN  trimethobenzamide, 200 mg, Q6H PRN  morphine, 2 mg, Q4H PRN  sodium chloride flush, 5-40 mL, PRN  heparin flush, 3 mL, PRN  sodium chloride flush, 10 mL, PRN  glucose, 15 g, PRN  dextrose, 12.5 g, PRN  glucagon (rDNA), 1 mg, PRN  dextrose, 100 mL/hr, PRN      Continuous Infusions:   dextrose         Review of Systems  All systems reviewed. All negative except for symptoms mentioned in HPI     OBJECTIVE    /88   Pulse 67   Temp 97.1 °F (36.2 °C) (Oral)   Resp 18   Ht 6' 3\" (1.905 m)   Wt 294 lb 12.8 oz (133.7 kg)   SpO2 97%   BMI 36.85 kg/m²     Intake/Output Summary (Last 24 hours) at 9/7/2021 0705  Last data filed at 9/7/2021 7556  Gross per 24 hour   Intake 1010 ml   Output 3050 ml   Net -2040 ml       Physical examination:  General: awake alert, oriented x3  HEENT: normocephalic non traumatic, no exophthalmos   Neck: supple,    Pulm: good equal air entry no added sounds  CVS: S1 + S2, s/p CABG   Abd: soft lax, no tenderness  Skin: warm, no lesions, no rash.    Neuro: CN intact, sensation decreased bilateral , muscle power normal  Psych: normal mood, and affect    Review of Laboratory Data:  I personally reviewed the following labs:   Recent Labs     09/05/21  0635 09/06/21  0445   WBC 14.1* 14.7*   RBC 3.71* 3.68*   HGB 11.3* 11.4*   HCT 35.7* 35.4*   MCV 96.2 96.2   MCH 30.5 31.0   MCHC 31.7* 32.2   RDW 15.8* 15.7*    176   MPV 11.5 11.6     Recent Labs     09/04/21  1400 09/05/21  0635 09/06/21  0445    132 131*   K 4.5 4.8 4.4   CL 98 98 96*   CO2 29 27 28   BUN 22 22 25*   CREATININE 1.0 1.0 1.0   GLUCOSE 86 208* 242*   CALCIUM 8.5* 8.8 8.9   PROT  --  5.8* 5.9*   LABALBU  --  3.1* 3.2*   BILITOT  --  0.6 0.6   ALKPHOS  --  75 72   AST  --  20 18   ALT  --  17 18     No results found for: BHYDRXBUT  Lab Results   Component Value Date    LABA1C 8.0 08/26/2021    LABA1C 8.4 08/10/2021    LABA1C 8.1 06/15/2021     No results found for: TSH, T4FREE, T5LBHNU, FT3, U3MBYRT, TSI, TPOABS, THGAB  Lab Results   Component Value Date    LABA1C 8.0 08/26/2021    GLUCOSE 242 09/06/2021    GLUCOSE 259 04/13/2012     Lab Results   Component Value Date    TRIG 70 06/18/2021    HDL 33 06/22/2021    LDLCALC 87 06/22/2021    CHOL 124 06/18/2021       Blood culture   No results found for: [facility]    Radiology:  XR CHEST PORTABLE   Final Result   1. Interim chest tube removal no sign of pneumothorax. 2. Moderate pulmonary venous hypertension, suspect mild perivascular edema. 3. Low lung volumes without focal consolidation. XR CHEST PORTABLE   Final Result   Right IJ catheter is removed. Mild to moderate pulmonary vascular congestion. XR CHEST PORTABLE   Final Result   1. Cardiomegaly. There are no findings of pneumonia or failure. 2. Stable position of the chest tubes. There is no right or left   pneumothorax. XR CHEST PORTABLE   Final Result   Left upper extremity PICC line terminates in the SVC. Monroe-Kenia catheter   removed. Stable cardiomegaly. Likely moderate size left-sided pleural effusion is   unchanged. Moderate pulmonary edema. XR CHEST PORTABLE   Final Result   1. There is no right or left pneumothorax. 2. Bilateral perihilar and lower lobe airspace disease. 3. Marked cardiomegaly. XR CHEST PORTABLE   Final Result   Mild cardiomegaly.   Pulmonary vascularity appears less prominent than the   prior study         XR CHEST PORTABLE   Final Result Stable postoperative chest with tubes and catheters as noted. Mild CHF. Medical Records/Labs/Images review:   I personally reviewed and summarized previous records   All labs and imaging were reviewed independently     Jo1 Gina Lyon, a 63 y.o.-old male seen today for inpatient diabetes management     Diabetes Mellitus type 2  · Patient's diabetes is uncontrolled   · BG still above goal  · For now, will change diabetes regimen to:  · Lantus 36 U nightly   · Humalog 10 U with meals   · High dose sliding scale with meals and at night   · Continue glucose check with meals and at bedtime   · Will titrate insulin dose based on the blood glucose trend & insulin requirement  · Will likely add SGLT2-inhibitors and/or GLP-1 agonist to his regimen as an outpatient   · Pt will follow with us after discharge. Endocrine follow up visit, Thursday 9/16 at 3:30PM     Sever MVCAD   · S/p CABG surgery 8/30/2021   · Management per CT surgery and cardiology service   · Discussed the importance of controlling DM in management of CAD     Sever MV stenosis   · S/p MVR on 8/30/2021   · Management per Cardiology and CT surgery     The above issues were reviewed with the patient who understood and agreed with the plan. Thank you for allowing us to participate in the care of this patient. Please do not hesitate to contact us with any additional questions. David Mcclellan MD  Endocrinologist, [place] [address] [facility]   [address] [place] [address]   Phone: [phone]  Fax: [phone]  --------------------------------------------  An electronic signature was used to authenticate this note.  Florencio Hoyos MD on 9/7/2021 at 7:05 AM

## 2021-09-07 NOTE — CONSULTS
Met with patient and discussed that their physician has ordered a referral to our outpatient Phase II Cardiac Rehabilitation program. Reviewed the benefits of cardiac rehabilitation based on their diagnosis and personal risk factors. Patient demonstrates moderate interest in Cardiac Rehabilitation at this time. Cardiac Rehabilitation brochure provided to patient/family. The Cardiac Rehabilitation Program has been provided the patient's referral information and pertinent patient details and history. The patient may call [facility] [place] at [phone] for additional information or questions. Contact information for [facility] [place] and other choices close to the patient's residence have been provided in the discharge instructions so that the patient may call and schedule an appointment when cleared by their physician.  Thank you for the referral.

## 2021-09-08 ENCOUNTER — TELEPHONE (OUTPATIENT)
Dept: CARDIOTHORACIC SURGERY | Age: 61
End: 2021-09-08

## 2021-09-09 ENCOUNTER — OUTSIDE SERVICES (OUTPATIENT)
Dept: PRIMARY CARE CLINIC | Age: 61
End: 2021-09-09
Payer: COMMERCIAL

## 2021-09-09 DIAGNOSIS — I25.810 CORONARY ARTERY DISEASE INVOLVING CORONARY BYPASS GRAFT OF NATIVE HEART WITHOUT ANGINA PECTORIS: Primary | ICD-10-CM

## 2021-09-09 DIAGNOSIS — I10 ESSENTIAL HYPERTENSION: ICD-10-CM

## 2021-09-09 DIAGNOSIS — I50.21 ACUTE SYSTOLIC CONGESTIVE HEART FAILURE (HCC): ICD-10-CM

## 2021-09-09 DIAGNOSIS — E66.01 CLASS 2 SEVERE OBESITY DUE TO EXCESS CALORIES WITH SERIOUS COMORBIDITY AND BODY MASS INDEX (BMI) OF 36.0 TO 36.9 IN ADULT (HCC): ICD-10-CM

## 2021-09-09 DIAGNOSIS — E11.65 UNCONTROLLED TYPE 2 DIABETES MELLITUS WITH HYPERGLYCEMIA (HCC): ICD-10-CM

## 2021-09-09 PROCEDURE — 99306 1ST NF CARE HIGH MDM 50: CPT | Performed by: NURSE PRACTITIONER

## 2021-09-09 ASSESSMENT — ENCOUNTER SYMPTOMS
VOICE CHANGE: 0
DIARRHEA: 0
COUGH: 0
EYE DISCHARGE: 0
PHOTOPHOBIA: 0
NAUSEA: 0
CHEST TIGHTNESS: 0
WHEEZING: 0
BLOOD IN STOOL: 0
CONSTIPATION: 0
SORE THROAT: 0
ABDOMINAL PAIN: 0
SINUS PRESSURE: 0
EYE ITCHING: 0
ABDOMINAL DISTENTION: 0
EYE PAIN: 0
FACIAL SWELLING: 0
EYE REDNESS: 0
SHORTNESS OF BREATH: 1
BACK PAIN: 0
RHINORRHEA: 0
CHOKING: 0
COLOR CHANGE: 0
VOMITING: 0
TROUBLE SWALLOWING: 0
SINUS PAIN: 0

## 2021-09-09 ASSESSMENT — VISUAL ACUITY: OU: 1

## 2021-09-09 NOTE — PROGRESS NOTES
21  Melinda Spring : 1960 Sex: male  Age: 63 y.o. David Bermudez is seen today to establish as a new patient at [facility]. He was admitted for congestive heart failure with an EF around 20%. He did have a CABG completed. He states he is having some tenderness, only when he coughs, but does have a pillow to help brace his chest.  He was given a LifeVest, to wear continuously, but he refused to wear it. He said the very uncomfortable and that he does not need it. He has told staff multiple times that he will leave AMA, when he is ready. He did state that he wanted to be home by this weekend, for the [facility] [employer] game. He states that he is able to care for himself at home. Staff denies any combative, disruptive, or aggressive behaviors from him. Review of Systems   Constitutional: Positive for fatigue. Negative for appetite change, chills, diaphoresis, fever and unexpected weight change. HENT: Negative for congestion, ear pain, facial swelling, hearing loss, nosebleeds, postnasal drip, rhinorrhea, sinus pressure, sinus pain, sneezing, sore throat, tinnitus, trouble swallowing and voice change. Eyes: Negative for photophobia, pain, discharge, redness and itching. Respiratory: Positive for shortness of breath (With exertion). Negative for cough, choking, chest tightness and wheezing. Cardiovascular: Positive for chest pain (Only when coughing) and leg swelling. Negative for palpitations. Gastrointestinal: Negative for abdominal distention, abdominal pain, blood in stool, constipation, diarrhea, nausea and vomiting. Endocrine: Negative for cold intolerance, heat intolerance, polydipsia, polyphagia and polyuria. Genitourinary: Negative for difficulty urinating, dysuria, flank pain, frequency, hematuria and urgency. Musculoskeletal: Positive for arthralgias and gait problem. Negative for back pain, joint swelling, myalgias, neck pain and neck stiffness. Skin: Positive for wound (To center of chest from CABG procedure). Negative for color change and rash. Allergic/Immunologic: Negative for environmental allergies and food allergies. Neurological: Positive for weakness (In legs). Negative for dizziness, tremors, seizures, syncope, facial asymmetry, speech difficulty, light-headedness, numbness and headaches. Hematological: Does not bruise/bleed easily. Psychiatric/Behavioral: Negative for agitation, behavioral problems, confusion, decreased concentration, hallucinations, self-injury, sleep disturbance and suicidal ideas. The patient is not nervous/anxious. Physical Exam  Vitals and nursing note reviewed. Constitutional:       General: He is awake. He is not in acute distress. Appearance: Normal appearance. He is well-developed. He is obese. He is not ill-appearing, toxic-appearing or diaphoretic. HENT:      Head: Normocephalic and atraumatic. Right Ear: Hearing and external ear normal. There is no impacted cerumen. Left Ear: Hearing and external ear normal. There is no impacted cerumen. Nose: Nose normal. No congestion or rhinorrhea. Mouth/Throat:      Lips: Pink. No lesions. Mouth: Mucous membranes are moist.      Pharynx: Oropharynx is clear. No oropharyngeal exudate or posterior oropharyngeal erythema. Eyes:      General: Lids are normal. Vision grossly intact. Gaze aligned appropriately. No scleral icterus. Right eye: No discharge. Left eye: No discharge. Extraocular Movements: Extraocular movements intact. Conjunctiva/sclera: Conjunctivae normal.      Right eye: Right conjunctiva is not injected. Left eye: Left conjunctiva is not injected. Pupils: Pupils are equal, round, and reactive to light. Neck:      Thyroid: No thyromegaly. Vascular: No carotid bruit. Trachea: Trachea normal.   Cardiovascular:      Rate and Rhythm: Normal rate and regular rhythm.       Pulses: Normal pulses. Heart sounds: Normal heart sounds, S1 normal and S2 normal. No murmur heard. No friction rub. No gallop. Pulmonary:      Effort: Pulmonary effort is normal. No tachypnea, accessory muscle usage or respiratory distress. Breath sounds: Normal breath sounds and air entry. No stridor. No wheezing, rhonchi or rales. Chest:      Chest wall: No tenderness. Abdominal:      General: Bowel sounds are normal. There is no distension. Palpations: Abdomen is soft. There is no mass. Tenderness: There is no abdominal tenderness. There is no right CVA tenderness, left CVA tenderness, guarding or rebound. Hernia: No hernia is present. Musculoskeletal:         General: No swelling, tenderness, deformity or signs of injury. Normal range of motion. Cervical back: Full passive range of motion without pain, normal range of motion and neck supple. No rigidity. No muscular tenderness. Right lower le+ Pitting Edema present. Left lower le+ Pitting Edema present. Lymphadenopathy:      Cervical: No cervical adenopathy. Skin:     General: Skin is warm and dry. Capillary Refill: Capillary refill takes less than 2 seconds. Coloration: Skin is not jaundiced or pale. Findings: No bruising, erythema, lesion or rash. Neurological:      General: No focal deficit present. Mental Status: He is alert and oriented to person, place, and time. Mental status is at baseline. Cranial Nerves: Cranial nerves are intact. No cranial nerve deficit. Sensory: Sensation is intact. No sensory deficit. Motor: Weakness present. Coordination: Coordination is intact.  Coordination normal.      Gait: Gait abnormal.      Deep Tendon Reflexes: Reflexes normal.   Psychiatric:         Attention and Perception: Attention and perception normal.         Mood and Affect: Mood and affect normal.         Speech: Speech normal.         Behavior: Behavior normal.

## 2021-09-10 LAB
BLOOD BANK DISPENSE STATUS: NORMAL
BLOOD BANK PRODUCT CODE: NORMAL
BPU ID: NORMAL
DESCRIPTION BLOOD BANK: NORMAL

## 2021-09-13 NOTE — DISCHARGE SUMMARY
Physician Discharge Summary     Patient ID:  Surekha Mercer  [id number]  39 y.o.  1960    Admit date: 8/30/2021    Discharge date and time: 9/7/2021  4:57 PM     Admitting Physician: Xena Encarnacion MD     Discharge Physician: same    Admission Diagnoses: CAD in native artery [I25.10]    Discharge Diagnoses: Severe aortic stenosis; severe multivessel  coronary artery disease; severe ischemic cardiomyopathy with an ejection  fraction of 15%; diabetes; hypertension; congestive heart failure, [place] Heart Association class III; acute-on-chronic systolic heart  failure; morbid obesity with a BMI of 36; mild COPD with an FEV1 of 72%  Predicted. Post op atrial fibrillation      OPERATIONS PERFORMED:  1. Insertion of left radial arterial line. 2.  Sternotomy. 3.  Aortic valve replacement with 27-mm Mane Inspiris bioprosthesis. 4.  Mitral valve repair by decalcification of the anterior leaflet of  the mitral valve. 5.  Reduction aortoplasty. 6.  Coronary artery bypass grafting x2; left internal mammary artery to  LAD, saphenous vein graft to the diagonal branch #1.  7.  Endoscopic harvesting left lower extremity greater saphenous vein. 8.  Left atrial appendage exclusion with a 35-mm AtriClip. 9.  Rigid internal fixation of the sternum using KLS plates x2. Discharged Condition: good    Indication for Admission: This is a 50-year-old man who was admitted with heart failure. He underwent echo which showed severely reduced ejection fraction, this  was confirmed by transesophageal echo which was confirmed severe  low-flow low-gradient aortic stenosis. Cardiac cath showed proximal LAD  and high diagonal branch of the LAD lesions. He is referred for  surgical intervention. We tried to do a cardiac MRI and however he  could not go through with it; therefore, we elected to bring him to the  operating room today for the above procedure with possible Impella  backup. Hospital Course: See above.  On 8/30/21 patient brought into the hospital. He underwent AVR, MV repair, reduction aortoplasty, CABG x 2, DERRICK exclusion by Dr. Bee Jackson. The patient tolerated the operation well and was transferred to CVICU in stable condition. The patient was extubated POD 1. PICC line was placed. He was on a significant amount of vasopressors, and these were slowly weaned. By POD 3 vaso and epi were weaned off. He was sent to the stepdown unit on IV dobutamine. This was slowly weaned. Chest tubes were removed in the usual fashion. Repeat echo was done to re-eval EF once he was off dobutamine. He qualified for WCD as his EF was 25-30%. He did have post op atrial fibrillation and was started on amiodarone and eventually eliquis. Endocrine was consulted fr uncontrolled DM2 and insulin regiment adjusted. He was  Treated with empiric zosyn for 7 days due to some leukocytosis. He was started on GDMT for reduced LVEF and hemodynamics tolerated. He was weaned off supplemental oxygen. With improvements in mobility and dietary intake, the patient was deemed ready for discharge to rehab on POD 8.     Consults: cardiology and endocrine    Discharge Exam:  Vitals   Vitals:     09/06/21 2330 09/07/21 0415 09/07/21 0632 09/07/21 0743   BP: (!) 131/96 138/88       Pulse: 74 67       Resp: 18 18       Temp: 97.3 °F (36.3 °C) 97.1 °F (36.2 °C)       TempSrc: Temporal Oral       SpO2: 94% 97%   93%   Weight:     294 lb 12.8 oz (133.7 kg)     Height:                 O2: RA        Intake/Output Summary (Last 24 hours) at 9/7/2021 0801  Last data filed at 9/7/2021 2400      Gross per 24 hour   Intake 1010 ml   Output 3050 ml   Net -2040 ml            +BM on 9/6     UO: 700mL/8hr               Recent Labs     09/05/21  0635 09/06/21  0445 09/07/21  0625   WBC 14.1* 14.7* 14.5*   HGB 11.3* 11.4* 11.9*   HCT 35.7* 35.4* 37.3    176 189            Recent Labs     09/05/21  0635 09/06/21  0445 09/07/21  0625   BUN 22 25* 27*   CREATININE 1.0 1.0 1.0         Telemetry: Afib        PE  Cardiac: irreg  Lungs: decreased bases  Chest incision with intact JONE DSD. Sternum stable. Prior chest tube site incisions C/D/I, no erythema with intact sutures. Epicardial pacing wires present and secure.    Abd: Soft, nontender, +BS  Ext: Incisions C/D/I, approximated, no erythema, + edema         Disposition: rehab    Patient Instructions:    Nicki Esqueda \"Bill\"   Home Medication Instructions YB:[id number]    Printed on:09/13/21 3200   Medication Information                      amiodarone (CORDARONE) 200 MG tablet  Take 400 mg orally twice daily for five days, then take 200 mg orally twice daily for five days, then take 200 mg orally daily for fourteen days, then discontinue             apixaban (ELIQUIS) 5 MG TABS tablet  Take 1 tablet by mouth 2 times daily             ascorbic acid (VITAMIN C) 500 MG tablet  Take 1 tablet by mouth 2 times daily             aspirin 81 MG EC tablet  Take 81 mg by mouth daily             atorvastatin (LIPITOR) 40 MG tablet  Take 1 tablet by mouth nightly             docusate sodium (COLACE) 100 MG capsule  Take 1 capsule by mouth 2 times daily as needed for Constipation (constipation)             ferrous sulfate (IRON 325) 325 (65 Fe) MG tablet  Take 1 tablet by mouth 2 times daily (with meals)             folic acid (FOLVITE) 1 MG tablet  Take 1 tablet by mouth daily             furosemide (LASIX) 40 MG tablet  Take 0.5 tablets by mouth daily             insulin glargine (LANTUS) 100 UNIT/ML injection vial  Inject 36 Units into the skin nightly             insulin lispro (HUMALOG) 100 UNIT/ML injection vial  Inject 0-18 Units into the skin 3 times daily (with meals)             insulin lispro (HUMALOG) 100 UNIT/ML injection vial  Inject 0-9 Units into the skin nightly             insulin lispro (HUMALOG) 100 UNIT/ML injection vial  Inject 10 Units into the skin 3 times daily (with meals)             lisinopril (PRINIVIL;ZESTRIL) 5 MG tablet  Take 1 tablet by mouth daily             magnesium oxide (MAG-OX) 400 (241.3 Mg) MG TABS tablet  Take 1 tablet by mouth daily for 14 days             metoprolol succinate (TOPROL XL) 25 MG extended release tablet  Take 0.5 tablets by mouth daily             oxyCODONE-acetaminophen (PERCOCET) 5-325 MG per tablet  Take 1 tablet by mouth every 6 hours as needed for Pain for up to 7 days. Intended supply: 7 days. Take lowest dose possible to manage pain             pantoprazole (PROTONIX) 40 MG tablet  Take 1 tablet by mouth daily for 14 days             potassium chloride (KLOR-CON M) 10 MEQ extended release tablet  Take 1 tablet by mouth every other day             predniSONE (DELTASONE) 10 MG tablet  Take 20 mg orally daily for 2 days, then 10 mg orally daily for three days, then 5 mg orally daily for three days, then stop               Activity: sternal precuations  Diet: cardiac diet, diabetic  Wound Care: keep wound clean and dry    Future Appointments   Date Time Provider Alcon Barrowi   9/16/2021  3:30 PM Alphonse Natarajan MD [facility]   9/28/2021  9:30 AM Gilford Spitz, MD CARDIO SURG [facility]         Smoking cessation education provided prior to discharge    Discussed with patient the benefits of participation in cardiac rehab after discharge once approved safe by the surgeon and that a referral will be made at the follow up appointment. Pt verbalized comprehension.     Signed:  Kaela Perez PA-C

## 2021-09-14 ENCOUNTER — TELEPHONE (OUTPATIENT)
Dept: ADMINISTRATIVE | Age: 61
End: 2021-09-14

## 2021-09-14 NOTE — TELEPHONE ENCOUNTER
Pt calling for HFU apt/timing with Dr. Jose Phillips dx: CAD; Sp bypass discharged on: 9/7/21. He also needs to know which medications he should be taking.

## 2021-09-14 NOTE — TELEPHONE ENCOUNTER
Medications as prescribed at discharge. Follow up 4-6 weeks post discharge if stable. If having issues let us know. Can we look into getting him auth'd for entresto (24-26 bid) and jardiance (10 mg daily) as his EF is still severely reduced. Currently on lisinopril.

## 2021-09-15 RX ORDER — EMPAGLIFLOZIN 10 MG/1
1 TABLET, FILM COATED ORAL DAILY
Qty: 90 TABLET | Refills: 3 | Status: SHIPPED | OUTPATIENT
Start: 2021-09-15

## 2021-09-28 ENCOUNTER — OFFICE VISIT (OUTPATIENT)
Dept: CARDIOTHORACIC SURGERY | Age: 61
End: 2021-09-28

## 2021-09-28 VITALS
SYSTOLIC BLOOD PRESSURE: 163 MMHG | HEART RATE: 65 BPM | DIASTOLIC BLOOD PRESSURE: 104 MMHG | BODY MASS INDEX: 33.57 KG/M2 | HEIGHT: 75 IN | WEIGHT: 270 LBS

## 2021-09-28 DIAGNOSIS — Z95.1 S/P CABG (CORONARY ARTERY BYPASS GRAFT): Primary | ICD-10-CM

## 2021-09-28 PROCEDURE — 99024 POSTOP FOLLOW-UP VISIT: CPT | Performed by: PHYSICIAN ASSISTANT

## 2021-09-28 ASSESSMENT — ENCOUNTER SYMPTOMS
COUGH: 0
SHORTNESS OF BREATH: 0

## 2021-09-28 NOTE — PROGRESS NOTES
Subjective:      Chief Complaint   Patient presents with    Post-Op Check     AVR CABGx2       Patient ID: Manish Yip is a 57 y.o. male who presents to office for routine 1 month follow up s/p AVR, CABG x 2 on 8/30. Patient states he is doing well and denies any CP, SOB or incisional problems. HPI    Review of Systems   Constitutional: Negative for chills and fever. Respiratory: Negative for cough and shortness of breath. Cardiovascular: Negative for chest pain, palpitations and leg swelling. Neurological: Negative for syncope and light-headedness. Objective:   Physical Exam  Constitutional:       Appearance: Normal appearance. Cardiovascular:      Rate and Rhythm: Normal rate and regular rhythm. Pulses: Normal pulses. Heart sounds: Normal heart sounds. Pulmonary:      Effort: Pulmonary effort is normal.      Breath sounds: Normal breath sounds. Comments: midsternal and chest tube incisions well healed without evidence of infection. Sternum stable. Abdominal:      General: Bowel sounds are normal.   Musculoskeletal:         General: Normal range of motion. Cervical back: Normal range of motion and neck supple. Comments: Endoscopic vein harvest incisions intact without evidence of infection     Neurological:      Mental Status: He is alert and oriented to person, place, and time. Assessment:      S/p CABG, AVR       Plan:      Remove sternal precautions in 2 weeks  Cardiac rehab referral  Continue follow up with PCP, Cardiology as scheduled. Follow up with cardiology for HTN and poss med adjustments   Encouraged to call office with any questions, concerns. Otherwise no further follow up necessary from CTS standpoint.               Gonzalez Smith PA-C

## 2021-10-13 ENCOUNTER — OFFICE VISIT (OUTPATIENT)
Dept: PODIATRY | Age: 61
End: 2021-10-13
Payer: COMMERCIAL

## 2021-10-13 VITALS — WEIGHT: 264 LBS | HEIGHT: 75 IN | BODY MASS INDEX: 32.83 KG/M2

## 2021-10-13 DIAGNOSIS — Z91.199 NONCOMPLIANCE: ICD-10-CM

## 2021-10-13 DIAGNOSIS — L97.509 TYPE 2 DIABETES MELLITUS WITH FOOT ULCER, UNSPECIFIED WHETHER LONG TERM INSULIN USE (HCC): Primary | ICD-10-CM

## 2021-10-13 DIAGNOSIS — M86.9 OSTEOMYELITIS OF SECOND TOE OF RIGHT FOOT (HCC): ICD-10-CM

## 2021-10-13 DIAGNOSIS — E11.621 TYPE 2 DIABETES MELLITUS WITH FOOT ULCER, UNSPECIFIED WHETHER LONG TERM INSULIN USE (HCC): Primary | ICD-10-CM

## 2021-10-13 PROCEDURE — G8417 CALC BMI ABV UP PARAM F/U: HCPCS | Performed by: PODIATRIST

## 2021-10-13 PROCEDURE — 99204 OFFICE O/P NEW MOD 45 MIN: CPT | Performed by: PODIATRIST

## 2021-10-13 PROCEDURE — 3017F COLORECTAL CA SCREEN DOC REV: CPT | Performed by: PODIATRIST

## 2021-10-13 PROCEDURE — G8427 DOCREV CUR MEDS BY ELIG CLIN: HCPCS | Performed by: PODIATRIST

## 2021-10-13 PROCEDURE — 1036F TOBACCO NON-USER: CPT | Performed by: PODIATRIST

## 2021-10-13 PROCEDURE — 3052F HG A1C>EQUAL 8.0%<EQUAL 9.0%: CPT | Performed by: PODIATRIST

## 2021-10-13 PROCEDURE — 2022F DILAT RTA XM EVC RTNOPTHY: CPT | Performed by: PODIATRIST

## 2021-10-13 PROCEDURE — G8484 FLU IMMUNIZE NO ADMIN: HCPCS | Performed by: PODIATRIST

## 2021-10-13 NOTE — PROGRESS NOTES
New patient in office with c/o wound to right second toe. States he has had this wound for several years since taking a trip to [facility] and wearing a pair of boots which rubbed. Jaxon Caceres : 1960 Sex: male  Age: 57 y.o. Patient was referred by Natalie Gallardo DO    CC:   Wound right second toe history of diabetes    HPI:   This 76-year-old male diabetic patient referred me today wound right second toe. States initially he had the wound for several years and then does state at least several weeks. Denies known injury or trauma recently but did have cardiac surgery several months ago. Does have some drainage and redness over the past week. No current antibiotics. Denies current nausea vomiting fever chills shortness of breath. Wearing regular shoes today with no bandage on his right second toe. No additional pedal complaints at this time.     ROS:  Const: Denies constitutional symptoms  Musculo: Denies symptoms other than stated above  Skin: Denies symptoms other than stated above      Current Outpatient Medications:     empagliflozin (JARDIANCE) 10 MG tablet, Take 1 tablet by mouth daily, Disp: 90 tablet, Rfl: 3    apixaban (ELIQUIS) 5 MG TABS tablet, Take 1 tablet by mouth 2 times daily, Disp: 60 tablet, Rfl: 3    insulin glargine (LANTUS) 100 UNIT/ML injection vial, Inject 36 Units into the skin nightly, Disp: 10 mL, Rfl: 3    insulin lispro (HUMALOG) 100 UNIT/ML injection vial, Inject 0-18 Units into the skin 3 times daily (with meals), Disp: 10 mL, Rfl: 3    insulin lispro (HUMALOG) 100 UNIT/ML injection vial, Inject 0-9 Units into the skin nightly, Disp: 10 mL, Rfl: 3    insulin lispro (HUMALOG) 100 UNIT/ML injection vial, Inject 10 Units into the skin 3 times daily (with meals), Disp: 10 mL, Rfl: 3    metoprolol succinate (TOPROL XL) 25 MG extended release tablet, Take 0.5 tablets by mouth daily, Disp: 30 tablet, Rfl: 3    ascorbic acid (VITAMIN C) 500 MG tablet, CRP and ESR. Wound cultures obtained in office today. Radiographs were ordered and I did review them with him in the office today. Radiographs were not read as osteomyelitis but they do demonstrate early osteomyelitic changes with destruction of the distal phalanx right second toe. I did recommend going to the emergency room for admission and infectious disease consultation with likely amputation. He denies this today due to work. States he will go over the weekend. I did have discussion regarding importance of going directly to emergency room for limb salvage and to ensure no worsening symptoms. He once again unable to do this per the patient. I did recommend in my medical opinion going to the emergency room. Addendum  CRP 1.9, white blood cell count 10.4, ESR 9. He did call the office back on Friday and stated he might go to [facility].  High risk for further amputation if noncompliance. Once again did recommend going to the hospital from the time I first met him in the office he declined this. Return in about 1 week (around 10/20/2021). Seen By:  Kameron Swain DPM      Document was created using voice recognition software. Note was reviewed however may contain grammatical errors.

## 2021-10-20 ENCOUNTER — TELEPHONE (OUTPATIENT)
Dept: PODIATRY | Age: 61
End: 2021-10-20

## 2021-10-20 NOTE — TELEPHONE ENCOUNTER
Yes I did recommend going to the emergency room after I saw him on Thursday. He is high risk for further lower extremity limb loss with noncompliance.

## 2021-10-20 NOTE — TELEPHONE ENCOUNTER
Called patient to insure he did go to [facility] to get treatment for his toe. Patient states he was not seen at any hospital over the weekend. States his toe is swollen and red and looks worse than it did before. Encouraged patient to to [facility] and even offered appointment in office today. Patient declined. Would like to get a second opinion before proceeding with any surgical intervention. States he is going to [facility] ER and seeing if they will just give him an antibiotic.

## 2021-11-12 ENCOUNTER — OFFICE VISIT (OUTPATIENT)
Dept: CARDIOLOGY CLINIC | Age: 61
End: 2021-11-12
Payer: COMMERCIAL

## 2021-11-12 VITALS
HEART RATE: 83 BPM | DIASTOLIC BLOOD PRESSURE: 84 MMHG | HEIGHT: 75 IN | WEIGHT: 274.9 LBS | RESPIRATION RATE: 18 BRPM | SYSTOLIC BLOOD PRESSURE: 136 MMHG | BODY MASS INDEX: 34.18 KG/M2

## 2021-11-12 DIAGNOSIS — I50.22 CHRONIC SYSTOLIC CONGESTIVE HEART FAILURE (HCC): Primary | ICD-10-CM

## 2021-11-12 DIAGNOSIS — Z95.2 S/P AVR: ICD-10-CM

## 2021-11-12 PROCEDURE — G8417 CALC BMI ABV UP PARAM F/U: HCPCS | Performed by: INTERNAL MEDICINE

## 2021-11-12 PROCEDURE — 99215 OFFICE O/P EST HI 40 MIN: CPT | Performed by: INTERNAL MEDICINE

## 2021-11-12 PROCEDURE — G8484 FLU IMMUNIZE NO ADMIN: HCPCS | Performed by: INTERNAL MEDICINE

## 2021-11-12 PROCEDURE — G8427 DOCREV CUR MEDS BY ELIG CLIN: HCPCS | Performed by: INTERNAL MEDICINE

## 2021-11-12 PROCEDURE — 93000 ELECTROCARDIOGRAM COMPLETE: CPT | Performed by: INTERNAL MEDICINE

## 2021-11-12 PROCEDURE — 3017F COLORECTAL CA SCREEN DOC REV: CPT | Performed by: INTERNAL MEDICINE

## 2021-11-12 PROCEDURE — 1036F TOBACCO NON-USER: CPT | Performed by: INTERNAL MEDICINE

## 2021-11-12 RX ORDER — METOPROLOL SUCCINATE 50 MG/1
50 TABLET, EXTENDED RELEASE ORAL DAILY
Qty: 30 TABLET | Refills: 5 | Status: SHIPPED | OUTPATIENT
Start: 2021-11-12

## 2021-11-12 NOTE — PROGRESS NOTES
OUTPATIENT CARDIOLOGY FOLLOW-UP    Name: Elvina Boeck    Age: 57 y.o. Primary Care Physician: Neil Black DO    Date of Service: 11/12/2021    Chief Complaint:   Chief Complaint   Patient presents with    Congestive Heart Failure     hf/u- pt has no complaints        Interim History:   Here for follow-up after open heart surgery. Has severe LV dysfunction, bicuspid aortic valve with severe AS, significant MR and aortic aneurysm and on 8/30/2021 underwent surgical repair detailed below. Had some postoperative cardiogenic shock requiring dobutamine and postop atrial fibrillation started on anticoagulation. Sent out with a wearable cardioverter defibrillator, but states alarms were going off in the ambulance on the way to rehab and he discontinued it and sent it back. He is feeling better. He is anxious to get back to work, he works as a . He is asking for clearance to go back to work. He is also on precautions but is not started cardiac rehab. He denies chest pain or shortness of breath. He feels much better. He was walking around at his truck a lot and was able to walk without getting short of breath like he used to. He denies lower extremity edema. He has some confusion about his medications. Had a toe taken off recently in [place]. He presents today in sinus rhythm.     Review of Systems:   Negative except as described above    Past Medical History:  Past Medical History:   Diagnosis Date    [facility] [address] [facility]     Diabetes mellitus ([facility]     Hypertension        Past Surgical History:  Past Surgical History:   Procedure Laterality Date    AORTIC MITRAL VALVE REPLACEMENT N/A 8/30/2021    AORTIC VAVLE REPLACEMENT, CORONARY ARTERY BYPASS, ATRIAL APPENDAGE CLIP, GABRIELE performed by Yue Ramírez MD at [address] [facility]  2021   [address] [facility]      at age of 2    TRANSESOPHAGEAL ECHOCARDIOGRAM  07/12/2021       Family History:  Family History   Problem Relation Age of Onset    Other Mother         esophageal tumor    Heart Disease Father     Breast Cancer Sister     No Known Problems Brother     No Known Problems Brother     No Known Problems Sister        Social History:  Social History     Tobacco Use    Smoking status: Former Smoker     Packs/day: 1.50     Types: Cigarettes     Start date: 6/15/1973     Quit date: 6/15/2001     Years since quittin.4    Smokeless tobacco: Never Used   Vaping Use    Vaping Use: Never used   Substance Use Topics    Alcohol use: Yes     Comment: Average 10 beers/week sometimes has mixed drinks    Drug use: No        Allergies:  No Known Allergies    Current Medications:    Current Outpatient Medications:     sacubitril-valsartan (ENTRESTO) 24-26 MG per tablet, Take 1 tablet by mouth 2 times daily, Disp: 60 tablet, Rfl: 3    metoprolol succinate (TOPROL XL) 50 MG extended release tablet, Take 1 tablet by mouth daily, Disp: 30 tablet, Rfl: 5    empagliflozin (JARDIANCE) 10 MG tablet, Take 1 tablet by mouth daily, Disp: 90 tablet, Rfl: 3    apixaban (ELIQUIS) 5 MG TABS tablet, Take 1 tablet by mouth 2 times daily, Disp: 60 tablet, Rfl: 3    insulin glargine (LANTUS) 100 UNIT/ML injection vial, Inject 36 Units into the skin nightly (Patient taking differently: Inject 50 Units into the skin nightly ), Disp: 10 mL, Rfl: 3    ascorbic acid (VITAMIN C) 500 MG tablet, Take 1 tablet by mouth 2 times daily, Disp: 60 tablet, Rfl: 0    Physical Exam:  /84   Pulse 83   Resp 18   Ht 6' 3\" (1.905 m)   Wt 274 lb 14.4 oz (124.7 kg)   BMI 34.36 kg/m²   Wt Readings from Last 3 Encounters:   21 274 lb 14.4 oz (124.7 kg)   10/13/21 264 lb (119.7 kg)   21 270 lb (122.5 kg)     Appearance: Overweight, awake, alert and oriented x 3, no acute respiratory distress  Skin: Intact, no rash  Head: Normocephalic, atraumatic  Eyes: EOMI, no conjunctival erythema  ENMT: No pharyngeal erythema, MMM, no rhinorrhea  Neck: Supple, no elevated JVP, no carotid bruits  Lungs: Clear to auscultation bilaterally. No wheezes, rales, or rhonchi. Cardiac: Regular rate and rhythm, +N2H6, systolic murmur left renal border. Well-healed sternotomy scar.   Abdomen: Soft, nontender, +bowel sounds  Extremities: Moves all extremities x 4, no lower extremity edema  Neurologic: No focal motor deficits apparent, normal mood and affect, alert and oriented x 3  Peripheral Pulses: Intact posterior tibial pulses bilaterally    Laboratory Tests:  Lab Results   Component Value Date    CREATININE 0.8 10/23/2021    BUN 18 10/23/2021     10/23/2021    K 3.8 10/23/2021     10/23/2021    CO2 23 10/23/2021     Lab Results   Component Value Date    MG 1.6 10/21/2021     Lab Results   Component Value Date    WBC 7.2 10/23/2021    HGB 13.3 (L) 10/23/2021    HCT 40.7 (L) 10/23/2021    MCV 89.7 10/23/2021     10/23/2021     Lab Results   Component Value Date    ALT 21 10/21/2021    AST 17 10/21/2021    ALKPHOS 63 10/21/2021    BILITOT 0.9 10/21/2021     No results found for: CKTOTAL, CKMB, CKMBINDEX, TROPONINI  Lab Results   Component Value Date    INR 1.19 10/20/2021    INR 1.8 08/30/2021    INR 1.3 08/26/2021    PROTIME 13.1 (H) 10/20/2021    PROTIME 19.2 (H) 08/30/2021    PROTIME 14.5 (H) 08/26/2021     No results found for: TSHFT4, TSH  Lab Results   Component Value Date    LABA1C 7.5 (H) 10/21/2021     Lab Results   Component Value Date     10/21/2021     Lab Results   Component Value Date    CHOL 124 06/18/2021     Lab Results   Component Value Date    TRIG 70 06/18/2021     Lab Results   Component Value Date    HDL 33 06/22/2021    HDL 35 06/18/2021     Lab Results   Component Value Date    LDLCALC 87 06/22/2021    LDLCALC 75 06/18/2021     Lab Results   Component Value Date    LABVLDL 22 06/22/2021    LABVLDL 14 06/18/2021     No results found for: CHOLHDLRATIO  No results for input(s): PROBNP in the last 72 hours. Cardiac Tests:  EC2021: Sinus rhythm 83 bpm.  First-degree AV block CT interval 234 ms. Left anterior fascicular block. Anterior infarct. Echocardiogram:   TTE 21 [place]   Summary   Mild to moderate concentric LVH   Moderate LV dilation   Severe global hypokineses   Ejection fraction is visually estimated at 25 to 30 %. Normal right ventricular size. Right ventricle global systolic function is mildly reduced . Mild mitral regurgitation is present. Individual aortic valve leaflets are not clearly visualized. The aortic valve appears moderately sclerotic. There low flow, low gradient moderate aortic stenosis   Moderate tricuspid regurgitation. RVSP is 50 mmHg. Pulmonary hypertension is moderate . GABRIELE 2021   Summary   Ejection fraction is visually estimated at 20-25%.   The aortic valve is bicuspid, with fusion of the right and left coronary   cusps.   Probably severe low-flow low-gradient aortic stenosis is present.   Mean gradient 30 mmHg.   YESI 0.8 cm2.   YESI by planimetry 1.2 cm2.   Ascending aorta and root are dilated. Stress test:    Stress test:    Dobutamine stress echo 2021     Summary   Dobutamine stress echocardiogram for assessment of low flow, low gradient   aortic stenosis.      True severe aortic stenosis is demonstrated.   The aortic valve area is 0.7 cm2 with a maximum gradient of 72 mmHg and a   mean gradient of 46 mmHg.   The YESI decreased from 0.7 cm2 to 0.6 cm2 at peak. The dimensionless index   remained less than 0.25 at all levels.   The aortic valve peak velocity, peak gradient and mean gradient are all in   the severe range.      There is severe global hypokinesis noted.   All segments suggest viability.   The contractile reserve is 50% suggested benefit to prognostication of   outcome with aortic valve intervention. Cardiac catheterization:   [facility] [address]   CONCLUSIONS:  1. Coronary artery disease:  A. Left main.   No significant disease. B.  LAD. Eccentric 80% to 90% very proximal vessel narrowing and 70% to  80% mid vessel disease after a moderate size diagonal branch with  myocardial bridging at the site. C. Intermediate ramus. Relatively large vessel with 95% proximal  vessel subtotal occlusion. D.  LCX. Large but nondominant vessel with mild luminal irregularities  and with 70% to 80% ostial narrowing of a small to moderate size  marginal branch. E.  RCA. Dominant vessel with around 30% to 40% proximal and mid and  distal vessel disease, and around 40% disease of the large posterior  descending artery branch. Orders Placed This Encounter   Procedures    EKG 12 Lead        Requested Prescriptions     Signed Prescriptions Disp Refills    sacubitril-valsartan (ENTRESTO) 24-26 MG per tablet 60 tablet 3     Sig: Take 1 tablet by mouth 2 times daily    metoprolol succinate (TOPROL XL) 50 MG extended release tablet 30 tablet 5     Sig: Take 1 tablet by mouth daily        ASSESSMENT / PLAN:  1. Coronary artery disease status post CABG Juliane Haddad, VD1) 8/30/2021 Dr. Yandy Zaldivar along with AVR/MVrp/DERRICK occ/aortoplasty  2. Bicuspid aortic valve with severe AS s/p AVR with 27 mm Mane  3. MR status post mitral repair with decalcification of anterior leaflet  4. Aortic aneurysm status post reduction aortoplasty  5. DERRICK occlusion with a 35 mm atrial clip  6. Chronic HFrEF. Compensated, euvolemic  7. Cardiomyopathy, mixed etiology ischemic, valvular EF 25-30% post op  8. Postoperative atrial fibrillation. Resolved spontaneously, now in sinus. [id number] 3  9. Anemia  10. Hyperkalemia  11. Type 2 diabetes  12. COVID-19 infection 11/2020  13. Obesity  14. BPH    Recommendations:  Doing well from cardiac standpoint recovering from surgery. He is functionally significantly improved. He is anxious to go back to work.     · Recommend cardiac rehab  · Okay to return to work from my standpoint  · Check echo before next visit given murmur  · Ultimately will need to check echo minimum of 3 months after optimized GDMT, if EF remains less than 35% would recommend primary prophylaxis ICD  · Optimize GDMT as follows  · Metoprolol succinate 50 mg daily  · Sacubitril/valsartan 2426 twice daily  · Empagliflozin 10 mg daily  · Hold off on MRA as he had some hyperkalemia in the hospital  · No volume overload, and diuresing with the SGLT2 inhibitor, loop diuretic as needed  · Declined wearable cardioverter defibrillator  · Continue anticoagulation for now given elevated stroke risk, prosthetic valves, LV dysfunction  · Aggressive risk factor modification including weight loss recommended  · Antibiotic prophylaxis for dental and high risk procedures  · Follow-up with PCP for diabetic management  · Follow-up in 3 months or sooner if need arises    Greater than 40-minute spent in this encounter, greater than 50% of time counseling coordinating care on both surgery, heart failure issues. The patient's current medication list, allergies, problem list and results of all previously ordered testing were reviewed at today's visit.     Ekta Sands MD, [facility]

## 2022-04-28 ENCOUNTER — TELEPHONE (OUTPATIENT)
Dept: CARDIOLOGY | Age: 62
End: 2022-04-28
